# Patient Record
Sex: MALE | Race: BLACK OR AFRICAN AMERICAN | Employment: UNEMPLOYED | URBAN - METROPOLITAN AREA
[De-identification: names, ages, dates, MRNs, and addresses within clinical notes are randomized per-mention and may not be internally consistent; named-entity substitution may affect disease eponyms.]

---

## 2019-03-13 ENCOUNTER — OFFICE VISIT (OUTPATIENT)
Dept: SLEEP MEDICINE | Age: 69
End: 2019-03-13

## 2019-03-13 VITALS
DIASTOLIC BLOOD PRESSURE: 69 MMHG | OXYGEN SATURATION: 97 % | WEIGHT: 302 LBS | HEART RATE: 79 BPM | HEIGHT: 71 IN | BODY MASS INDEX: 42.28 KG/M2 | SYSTOLIC BLOOD PRESSURE: 124 MMHG

## 2019-03-13 DIAGNOSIS — G47.33 OSA (OBSTRUCTIVE SLEEP APNEA): Primary | ICD-10-CM

## 2019-03-13 DIAGNOSIS — Z86.73 H/O: STROKE: ICD-10-CM

## 2019-03-13 DIAGNOSIS — I10 ESSENTIAL HYPERTENSION: ICD-10-CM

## 2019-03-13 RX ORDER — FUROSEMIDE 20 MG/1
TABLET ORAL DAILY
COMMUNITY

## 2019-03-13 RX ORDER — AMLODIPINE BESYLATE 10 MG/1
TABLET ORAL DAILY
COMMUNITY

## 2019-03-13 RX ORDER — ATORVASTATIN CALCIUM 40 MG/1
TABLET, FILM COATED ORAL DAILY
COMMUNITY

## 2019-03-13 RX ORDER — FINASTERIDE 5 MG/1
5 TABLET, FILM COATED ORAL DAILY
COMMUNITY

## 2019-03-13 NOTE — PROGRESS NOTES
217 [facility] [place]. [place], [address]  Tel.  [phone]  Fax. 100 [facility] 60  [place], [address] [facility]  Tel.  [phone]  Fax. [phone] [address]   Tel.  [phone]  Fax. [phone]         Subjective:      Zayra Rudolph is an 77 y.o. male referred for evaluation for a sleep disorder. He complains of snoring associated with excessive daytime sleepiness. Symptoms began several years ago, unchanged since that time. He was previously diagnosed with LUPE (AHI:  15 per hour) and prescribed CPAP Therapy which he found difficult to tolerate and stopped using it. He has tried different masks but without much benefit. He usually can fall asleep in 5 minutes. Family or house members note snoring. He denies completely or partially paralyzed while falling asleep or waking up. Zayra Rudolph does not wake up frequently at night. He is not bothered by waking up too early and left unable to get back to sleep. He actually sleeps about 6 hours at night and wakes up about 3 times during the night. He does not work shifts: Cristiano Spain indicates he does get too little sleep at night. His bedtime is 0000. He awakens at 0600. He does take naps. He takes 7 naps a week lasting 10 to 15, Minute(s). He has the following observed behaviors: Loud snoring, Light snoring;  . Other remarks: vivd dreams    [place] Sleepiness Score: 7     No Known Allergies      Current Outpatient Medications:     atorvastatin (LIPITOR) 40 mg tablet, Take  by mouth daily. , Disp: , Rfl:     furosemide (LASIX) 20 mg tablet, Take  by mouth daily. , Disp: , Rfl:     amLODIPine (NORVASC) 10 mg tablet, Take  by mouth daily. , Disp: , Rfl:     multivitamin-iron-FA, hematinic, (CENTRUM)  mg-mcg tab tablet, Take 1 Tab by mouth daily. , Disp: , Rfl:     brexpiprazole (REXULTI) 1 mg tab tablet, Take  by mouth daily. , Disp: , Rfl:     finasteride (PROSCAR) 5 mg tablet, Take 5 mg by mouth daily. , Disp: , Rfl:     citalopram (CELEXA) 10 mg tablet, Take 10 mg by mouth daily. , Disp: , Rfl:     ARIPiprazole (ABILIFY) 15 mg tablet, Take 15 mg by mouth daily. , Disp: , Rfl:     calcium 500 mg tab, Take  by mouth., Disp: , Rfl:     lisinopril (PRINIVIL, ZESTRIL) 20 mg tablet, Take 20 mg by mouth daily. , Disp: , Rfl:     Hydrochlorothiazide (HYDRODIURIL) 12.5 mg tablet, Take 12.5 mg by mouth daily. , Disp: , Rfl:     rosuvastatin (CRESTOR) 20 mg tablet, Take 20 mg by mouth nightly., Disp: , Rfl:     metFORMIN (GLUCOPHAGE) 500 mg tablet, Take 500 mg by mouth two (2) times daily (with meals). , Disp: , Rfl:     acetaminophen (TYLENOL) 325 mg tablet, Take 1,000 mg by mouth every four (4) hours as needed for Pain., Disp: , Rfl:     aspirin delayed-release 81 mg tablet, Take 325 mg by mouth every eight (8) hours. , Disp: , Rfl:     levothyroxine (SYNTHROID) 25 mcg tablet, Take 25 mcg by mouth Daily (before breakfast). , Disp: , Rfl:     oxyCODONE IR (ROXICODONE) 10 mg tab immediate release tablet, Take 0.5 Tabs by mouth every four (4) hours as needed. Max Daily Amount: 30 mg., Disp: 12 Tab, Rfl: 0    meloxicam (MOBIC) 7.5 mg tablet, Take 1 Tab by mouth daily. , Disp: 12 Tab, Rfl: 0    cyclobenzaprine (FLEXERIL) 10 mg tablet, Take 1 Tab by mouth three (3) times daily as needed for Muscle Spasm(s). , Disp: 20 Tab, Rfl: 0     He  has a past medical history of Hypertension, Ill-defined condition, and Stroke ([address] He  has a past surgical history that includes pr extrac erupted tooth/exposed root; hx trabeculectomy (Right); upper gi endoscopy,biopsy (4/9/2015); and colonoscopy,caden juarez,snare (4/9/2015). He family history includes Hypertension in his father. He  reports that he has quit smoking. His smoking use included cigarettes. He has a 20.00 pack-year smoking history. he has never used smokeless tobacco. He reports that he does not drink alcohol or use drugs.      Review of Systems:  Constitutional:  significant weight gain  Eyes:  No blurred vision  CVS:  No significant chest pain  Pulm:  No significant shortness of breath  GI:  No significant nausea or vomiting  :  No significant nocturia  Musculoskeletal:  No significant joint pain at night  Skin:  No significant rashes  Neuro:  No significant dizziness   Psych:  No active mood issues    Sleep Review of Systems: notable for no difficulty falling asleep; infrequent awakenings at night;  regular dreaming noted; no nightmares ; no early morning headaches; no memory problems; no concentration issues; no history of any automobile or occupational accidents due to daytime drowsiness. Objective:     Visit Vitals  /69 (BP 1 Location: Left arm, BP Patient Position: Sitting)   Pulse 79   Ht 5' 11\" (1.803 m)   Wt 302 lb (137 kg)   SpO2 97%   BMI 42.12 kg/m²         General:   Not in acute distress   Eyes:  Anicteric sclerae, no obvious strabismus   Nose:  No obvious nasal septum deviation    Oropharynx:   Class 4 oropharyngeal outlet, thick tongue base, uvula could not be seen due to low-lying soft palate, narrow tonsilo-pharyngeal pilars   Tonsils:   tonsils are not seen due to low-lying soft palate   Neck:   Neck circ. in \"inches\": 19; midline trachea   Chest/Lungs:  Equal lung expansion, clear on auscultation    CVS:  Normal rate, regular rhythm; no JVD   Skin:  Warm to touch; no obvious rashes   Neuro:  No focal deficits ; no obvious tremor    Psych:  Normal affect,  normal countenance;          Assessment:       ICD-10-CM ICD-9-CM    1. LUPE (obstructive sleep apnea) G47.33 327.23 POLYSOMNOGRAPHY 1 NIGHT   2. BMI 40.0-44.9, adult ([facility] Z68.41 V85.41    3. Essential hypertension I10 401.9    4. H/O: stroke Z86.73 V12.54          Plan:     * The patient currently has a High Risk for having sleep apnea. STOP-BANG score 7.  * Sleep testing was ordered for initial evaluation.       Orders Placed This Encounter    POLYSOMNOGRAPHY 1 NIGHT     Standing Status:   Future     Standing Expiration Date:   9/13/2019     Scheduling Instructions:      Perform ETCO2 monitoring during Polysomnography - Do not split. Order Specific Question:   Reason for Exam     Answer:   LUPE / OHS       * He was provided information on sleep apnea including coresponding risk factors and the importance of proper treatment. * Treatment options if indicated were reviewed today. Patient agrees to a trial of Bi-Level PAP therapy if indicated due to prior CPAP Failure. * Counseling was provided regarding proper sleep hygiene (including effect of light on sleep), paradoxical intention, stimulus control, sleep environment safety and safe driving. * Effect of sleep disturbance on weight was reviewed. We have recommended a dedicated weight loss through appropriate diet and an exercise regiment as significant weight reduction has been shown to reduce severity of obstructive sleep apnea. * Patient agrees to telephone [phone]  follow-up by myself or lead sleep technologist shortly after sleep study to review results and plan final management.     (patient has given permission for a message to be left regarding test results and further management if patient cannot be cannot be reached directly). Thank you for allowing us to participate in your patient's medical care. We'll keep you updated on these investigations. Dyllan Reagan MD, FAASM  Electronically signed.  03/13/19

## 2019-03-13 NOTE — PATIENT INSTRUCTIONS
217 [facility] [place]. [place], [address]  Tel.  [phone]  Fax. 100 [facility] 60  [place], [address] [facility]  Tel.  [phone]  Fax. [phone] [address]  Tel.  [phone]  Fax. [phone]     Sleep Apnea: After Your Visit  Your Care Instructions  Sleep apnea occurs when you frequently stop breathing for 10 seconds or longer during sleep. It can be mild to severe, based on the number of times per hour that you stop breathing or have slowed breathing. Blocked or narrowed airways in your nose, mouth, or throat can cause sleep apnea. Your airway can become blocked when your throat muscles and tongue relax during sleep. Sleep apnea is common, occurring in 1 out of 20 individuals. Individuals having any of the following characteristics should be evaluated and treated right away due to high risk and detrimental consequences from untreated sleep apnea:  1. Obesity  2. Congestive Heart failure  3. Atrial Fibrillation  4. Uncontrolled Hypertension  5. Type II Diabetes  6. Night-time Arrhythmias  7. Stroke  8. Pulmonary Hypertension  9. High-risk Driving Populations (pilots, truck drivers, etc.)  10. Patients Considering Weight-loss Surgery    How do you know you have sleep apnea? You probably have sleep apnea if you answer 'yes' to 3 or more of the following questions:  S - Have you been told that you Snore? T - Are you often Tired during the day? O - Has anyone Observed you stop breathing while sleeping? P- Do you have (or are being treated for) high blood Pressure? B - Are you obese (Body Mass Index > 35)? A - Is your Age 48years old or older? N - Is your Neck size greater than 16 inches? G - Are you male Gender? A sleep physician can prescribe a breathing device that prevents tissues in the throat from blocking your airway.  Or your doctor may recommend using a dental device (oral breathing device) to help keep your airway open. In some cases, surgery may be needed to remove enlarged tissues in the throat. Follow-up care is a key part of your treatment and safety. Be sure to make and go to all appointments, and call your doctor if you are having problems. It's also a good idea to know your test results and keep a list of the medicines you take. How can you care for yourself at home? · Lose weight, if needed. It may reduce the number of times you stop breathing or have slowed breathing. · Go to bed at the same time every night. · Sleep on your side. It may stop mild apnea. If you tend to roll onto your back, sew a pocket in the back of your pajama top. Put a tennis ball into the pocket, and stitch the pocket shut. This will help keep you from sleeping on your back. · Avoid alcohol and medicines such as sleeping pills and sedatives before bed. · Do not smoke. Smoking can make sleep apnea worse. If you need help quitting, talk to your doctor about stop-smoking programs and medicines. These can increase your chances of quitting for good. · Prop up the head of your bed 4 to 6 inches by putting bricks under the legs of the bed. · Treat breathing problems, such as a stuffy nose, caused by a cold or allergies. · Use a continuous positive airway pressure (CPAP) breathing machine if lifestyle changes do not help your apnea and your doctor recommends it. The machine keeps your airway from closing when you sleep. · If CPAP does not help you, ask your doctor whether you should try other breathing machines. A bilevel positive airway pressure machine has two types of air pressureâone for breathing in and one for breathing out. Another device raises or lowers air pressure as needed while you breathe. · If your nose feels dry or bleeds when using one of these machines, talk with your doctor about increasing moisture in the air. A humidifier may help.   · If your nose is runny or stuffy from using a breathing machine, talk with your doctor about using decongestants or a corticosteroid nasal spray. When should you call for help? Watch closely for changes in your health, and be sure to contact your doctor if:  · You still have sleep apnea even though you have made lifestyle changes. · You are thinking of trying a device such as CPAP. · You are having problems using a CPAP or similar machine. Where can you learn more? Go to [facility]. Enter D189 in the search box to learn more about \"Sleep Apnea: After Your Visit. \"   © 6950-7740 Healthwise, Incorporated. Care instructions adapted under license by [facility] [employer] (which disclaims liability or warranty for this information). This care instruction is for use with your licensed healthcare professional. If you have questions about a medical condition or this instruction, always ask your healthcare professional. Dony Ayala any warranty or liability for your use of this information. PROPER SLEEP HYGIENE    What to avoid  · Do not have drinks with caffeine, such as coffee or black tea, for 8 hours before bed. · Do not smoke or use other types of tobacco near bedtime. Nicotine is a stimulant and can keep you awake. · Avoid drinking alcohol late in the evening, because it can cause you to wake in the middle of the night. · Do not eat a big meal close to bedtime. If you are hungry, eat a light snack. · Do not drink a lot of water close to bedtime, because the need to urinate may wake you up during the night. · Do not read or watch TV in bed. Use the bed only for sleeping and sexual activity. What to try  · Go to bed at the same time every night, and wake up at the same time every morning. Do not take naps during the day. · Keep your bedroom quiet, dark, and cool. · Get regular exercise, but not within 3 to 4 hours of your bedtime. .  · Sleep on a comfortable pillow and mattress.   · If watching the clock makes you anxious, turn it facing away from you so you cannot see the time. · If you worry when you lie down, start a worry book. Well before bedtime, write down your worries, and then set the book and your concerns aside. · Try meditation or other relaxation techniques before you go to bed. · If you cannot fall asleep, get up and go to another room until you feel sleepy. Do something relaxing. Repeat your bedtime routine before you go to bed again. · Make your house quiet and calm about an hour before bedtime. Turn down the lights, turn off the TV, log off the computer, and turn down the volume on music. This can help you relax after a busy day. Drowsy Driving  The [address] [place] [address] Road Traffic Safety Administration cites drowsiness as a causing factor in more than 424,338 police reported crashes annually, resulting in 76,000 injuries and 1,500 deaths. Other surveys suggest 55% of people polled have driven while drowsy in the past year, 23% had fallen asleep but not crashed, 3% crashed, and 2% had and accident due to drowsy driving. Who is at risk? Young Drivers: One study of drowsy driving accidents states that 55% of the drivers were under 25 years. Of those, 75% were male. Shift Workers and Travelers: People who work overnight or travel across time zones frequently are at higher risk of experiencing Circadian Rhythm Disorders. They are trying to work and function when their body is programed to sleep. Sleep Deprived: Lack of sleep has a serious impact on your ability to pay attention or focus on a task. Consistently getting less than the average of 8 hours your body needs creates partial or cumulative sleep deprivation. Untreated Sleep Disorders: Sleep Apnea, Narcolepsy, R.L.S., and other sleep disorders (untreated) prevent a person from getting enough restful sleep. This leads to excessive daytime sleepiness and increases the risk for drowsy driving accidents by up to 7 times.   Medications / Alcohol: Even over the counter medications can cause drowsiness. Medications that impair a drivers attention should have a warning label. Alcohol naturally makes you sleepy and on its own can cause accidents. Combined with excessive drowsiness its effects are amplified. Signs of Drowsy Driving:   * You don't remember driving the last few miles   * You may drift out of your [place]   * You are unable to focus and your thoughts wander   * You may yawn more often than normal   * You have difficulty keeping your eyes open / nodding off   * Missing traffic signs, speeding, or tailgating  Prevention-   Good sleep hygiene, lifestyle and behavioral choices have the most impact on drowsy driving. There is no substitute for sleep and the average person requires 8 hours nightly. If you find yourself driving drowsy, stop and sleep. Consider the sleep hygiene tips provided during your visit as well. Medication Refill Policy: Refills for all medications require 1 week advance notice. Please have your pharmacy fax a refill request. We are unable to fax, or call in \"controled substance\" medications and you will need to pick these prescriptions up from our office. getupp Activation    Thank you for requesting access to getupp. Please follow the instructions below to securely access and download your online medical record. getupp allows you to send messages to your doctor, view your test results, renew your prescriptions, schedule appointments, and more. How Do I Sign Up? 1. In your internet browser, go to [email or web address]. 2. Click on the First Time User? Click Here link in the Sign In box. You will see the New Member Sign Up page. 3. Enter your getupp Access Code exactly as it appears below. You will not need to use this code after youve completed the sign-up process. If you do not sign up before the expiration date, you must request a new code.     getupp Access Code: [email or web address]  Expires: 4/27/2019 11:29 AM (This is the date your NetLex access code will )    4. Enter the last four digits of your Social Security Number (xxxx) and Date of Birth (mm/dd/yyyy) as indicated and click Submit. You will be taken to the next sign-up page. 5. Create a MoveEZt ID. This will be your NetLex login ID and cannot be changed, so think of one that is secure and easy to remember. 6. Create a NetLex password. You can change your password at any time. 7. Enter your Password Reset Question and Answer. This can be used at a later time if you forget your password. 8. Enter your e-mail address. You will receive e-mail notification when new information is available in [address] 9. Click Sign Up. You can now view and download portions of your medical record. 10. Click the Download Summary menu link to download a portable copy of your medical information. Additional Information    If you have questions, please call [phone]. Remember, NetLex is NOT to be used for urgent needs. For medical emergencies, dial 911.

## 2019-04-16 ENCOUNTER — HOSPITAL ENCOUNTER (OUTPATIENT)
Dept: SLEEP MEDICINE | Age: 69
Discharge: HOME | End: 2019-04-16
Attending: INTERNAL MEDICINE
Payer: MEDICARE

## 2019-04-16 VITALS
OXYGEN SATURATION: 95 % | BODY MASS INDEX: 42.7 KG/M2 | WEIGHT: 305 LBS | DIASTOLIC BLOOD PRESSURE: 95 MMHG | SYSTOLIC BLOOD PRESSURE: 170 MMHG | HEIGHT: 71 IN | TEMPERATURE: 99.6 F

## 2019-04-16 DIAGNOSIS — G47.33 OSA (OBSTRUCTIVE SLEEP APNEA): ICD-10-CM

## 2019-04-16 PROCEDURE — 95810 POLYSOM 6/> YRS 4/> PARAM: CPT

## 2019-04-17 ENCOUNTER — TELEPHONE (OUTPATIENT)
Dept: SLEEP MEDICINE | Age: 69
End: 2019-04-17

## 2019-04-17 DIAGNOSIS — G47.33 OSA (OBSTRUCTIVE SLEEP APNEA): Primary | ICD-10-CM

## 2019-04-24 NOTE — TELEPHONE ENCOUNTER
Ciro June is to be contacted by lead sleep technologist regarding results of Sleep Testing which was indicative of an average AHI of 53 per hour with an SpO2 lucy of 81% . * A second polysomnogram has been ordered for mask fitting, PAP desensitizing protocol, and pressure titration. * Follow-up appointment will be scheduled 8-12 weeks following PAP initiation to gauge treatment response and compliance. Encounter Diagnosis   Name Primary?     LUPE (obstructive sleep apnea) Yes       Orders Placed This Encounter    SLEEP LAB (PAP TITRATION)     Standing Status:   Future     Standing Expiration Date:   10/24/2019     Order Specific Question:   Reason for Exam     Answer:   LUPE

## 2019-04-29 ENCOUNTER — TELEPHONE (OUTPATIENT)
Dept: SLEEP MEDICINE | Age: 69
End: 2019-04-29

## 2019-04-29 NOTE — TELEPHONE ENCOUNTER
Patient called in stating he has been waiting on results, study done on 4/16/19.  Please call at [phone]

## 2019-05-06 ENCOUNTER — HOSPITAL ENCOUNTER (OUTPATIENT)
Dept: SLEEP MEDICINE | Age: 69
Discharge: HOME | End: 2019-05-06
Attending: INTERNAL MEDICINE
Payer: MEDICARE

## 2019-05-06 VITALS
HEART RATE: 77 BPM | SYSTOLIC BLOOD PRESSURE: 166 MMHG | OXYGEN SATURATION: 97 % | BODY MASS INDEX: 41.19 KG/M2 | HEIGHT: 72 IN | WEIGHT: 304.1 LBS | DIASTOLIC BLOOD PRESSURE: 94 MMHG | TEMPERATURE: 98.2 F

## 2019-05-06 DIAGNOSIS — G47.33 OSA (OBSTRUCTIVE SLEEP APNEA): ICD-10-CM

## 2019-05-06 PROCEDURE — 95811 POLYSOM 6/>YRS CPAP 4/> PARM: CPT | Performed by: INTERNAL MEDICINE

## 2019-05-08 ENCOUNTER — TELEPHONE (OUTPATIENT)
Dept: SLEEP MEDICINE | Age: 69
End: 2019-05-08

## 2019-05-08 DIAGNOSIS — G47.33 OSA (OBSTRUCTIVE SLEEP APNEA): Primary | ICD-10-CM

## 2019-05-15 ENCOUNTER — DOCUMENTATION ONLY (OUTPATIENT)
Dept: SLEEP MEDICINE | Age: 69
End: 2019-05-15

## 2019-05-15 NOTE — TELEPHONE ENCOUNTER
Orders Placed This Encounter    AMB SUPPLY ORDER     Diagnosis: (G47.33) LUPE (obstructive sleep apnea)  (primary encounter diagnosis)     Respironics DreamStation ( Unit - Auto Mode) / Heated Humidifier :    Positive Airway Pressure Therapy: Duration of need: 99 months. Auto - PAP: 9 - 12 cmH2O;   Ramp Time: 30 Minutes; Flex: 2. Remote monitoring enrollment.  Full Face Mask 1 every 3 months.  Full Face Mask Cushion 1 per month.  Headgear 1 every 6 months.  Filter(s) Disposable 2 per month.  Filter(s) Non-Disposable 1 every 6 months. [address] for [employer] (Replace) 1 every 6 months.  Tubing with heating element 1 every 3 months. Perform Mask Fitting per patient preference and comfort - replace as above. Eden Sanchez MD, FAASM; NPI: [id number]  Electronically signed. 05/15/19

## 2019-05-16 ENCOUNTER — DOCUMENTATION ONLY (OUTPATIENT)
Dept: SLEEP MEDICINE | Age: 69
End: 2019-05-16

## 2019-05-16 ENCOUNTER — TELEPHONE (OUTPATIENT)
Dept: SLEEP MEDICINE | Age: 69
End: 2019-05-16

## 2019-05-16 NOTE — TELEPHONE ENCOUNTER
Called to schedule adherence visit and review DME info- mailbox not set up unable to leave a message.

## 2019-09-11 ENCOUNTER — OFFICE VISIT (OUTPATIENT)
Dept: SLEEP MEDICINE | Age: 69
End: 2019-09-11

## 2019-09-11 VITALS
SYSTOLIC BLOOD PRESSURE: 136 MMHG | WEIGHT: 301.5 LBS | OXYGEN SATURATION: 100 % | BODY MASS INDEX: 39.96 KG/M2 | HEART RATE: 72 BPM | HEIGHT: 73 IN | DIASTOLIC BLOOD PRESSURE: 84 MMHG

## 2019-09-11 DIAGNOSIS — Z86.73 H/O: STROKE: ICD-10-CM

## 2019-09-11 DIAGNOSIS — I10 ESSENTIAL HYPERTENSION: ICD-10-CM

## 2019-09-11 DIAGNOSIS — G47.33 OSA (OBSTRUCTIVE SLEEP APNEA): Primary | ICD-10-CM

## 2019-09-11 PROBLEM — E66.01 SEVERE OBESITY (HCC): Status: ACTIVE | Noted: 2019-09-11

## 2019-09-11 NOTE — PROGRESS NOTES
217 [facility] [address]. [place], [address]   Tel.  [phone]   Fax. 100 [facility] 60   [place], [address] [facility]   Tel.  [phone]   Fax. [phone] [address]    Tel.  [phone]   Fax. [phone]       Subjective:   Marie Hagan is a 68 y.o. male seen for a positive airway pressure follow-up, last seen by Dr. Michael Bhagat on 3/13/2019, prior notes reviewed in detail. In lab sleep test 4/2019 showed AHI of 53.4/hr with a lowest SaO2 of 81%. He is here for annual follow up. He reports no problems using the device. The following concerns reviewed:    Drowsiness no Problems exhaling no   Snoring no Forget to put on no   Mask Comfortable yes Can't fall asleep no   Dry Mouth no Mask falls off no   Air Leaking no Frequent awakenings no       He admits that his sleep has improved on PAP therapy using full face mask and heated tubing. Review of device download indicated:  Auto pressure: 9-12 cmH2O; Average % Night in Large Leak:0.4; % Used Days >= 4 hours: 83. Therapy Apnea Index averaged over PAP use: 3.4 /hr which reflects improved sleep breathing condition. [place] Sleepiness Score: 10 and Modified F.O.S.Q. Score Total / 2: 20     No Known Allergies    He has a current medication list which includes the following prescription(s): atorvastatin, amlodipine, multivitamin-iron-fa (hematinic), brexpiprazole, finasteride, lisinopril, hydrochlorothiazide, metformin, acetaminophen, aspirin delayed-release, levothyroxine, furosemide, citalopram, aripiprazole, calcium, rosuvastatin, oxycodone ir, meloxicam, and cyclobenzaprine. .      He  has a past medical history of Hypertension, Ill-defined condition, and Stroke ([facility] [address] Sleep Review of Systems: notable for Negative difficulty falling asleep; Negative awakenings at night; Negative early morning headaches; Negative memory problems; Negative concentration issues;  Negative chest pain; Negative shortness of breath; Negative significant joint pain at night; Negative significant muscle pain at night; Negative rashes or itching; Negative heartburn; Negative significant mood issues; 7 afternoon naps per week; Negative history of any automobile or occupational accidents due to daytime drowsiness      Objective:     Visit Vitals  /84   Pulse 72   Ht 6' 1\" (1.854 m)   Wt 301 lb 8 oz (136.8 kg)   SpO2 100%   BMI 39.78 kg/m²          General:   Alert, oriented, not in acute distress   Eyes:  Anicteric Sclerae; no obvious strabismus   Nose:  No obvious nasal septum deviation    Oropharynx:   Mallampati score 4, thick tongue base, uvula not seen due to low-lying soft palate, narrow tonsilo-pharyngeal pilars   Neck:   Midline trachea   Chest/Lungs:  Symmetrical lung expansion, clear lung fields on auscultation    CVS:  Normal rate, regular rhythm,  no JVD   Extremities:  No obvious rashes, no edema    Neuro:  No focal deficits; No obvious tremor    Psych:  Normal affect,  normal countenance       Assessment:       ICD-10-CM ICD-9-CM    1. LUPE (obstructive sleep apnea) G47.33 327.23 AMB SUPPLY ORDER   2. Essential hypertension I10 401.9    3. H/O: stroke Z86.73 V12.54    4. Adult BMI 39.0-39.9 kg/sq m Z68.39 V85.39        AHI = 53.4(4/2019). On APAP :  9-12 cmH2O. He is compliant with PAP therapy and PAP continues to benefit patient and remains necessary for control of his sleep apnea. Plan:     Follow-up and Dispositions    · Return in about 1 year (around 9/11/2020). * Continue on current pressures    * Supplies ordered - full face mask and heated tubing    Orders Placed This Encounter    AMB SUPPLY ORDER     Diagnosis: (G47.33) LUPE (obstructive sleep apnea)  (primary encounter diagnosis)     Replacement Supplies for Positive Airway Pressure Therapy Device:   Duration of need: 99 months.  Full Face Mask 1 every 3 months.  Full Face Mask Cushion 1 per month.      Headgear 1 every 6 months.  Tubing with heating element 1 every 3 months.  Filter(s) Disposable 2 per month.  Filter(s) Non-Disposable 1 every 6 months. .   [address] [facility] for Humidifier (Replace) 1 every 6 months. KEVIN Sears; NPI: [id number]    Electronically signed. Date:- 09/11/19       * Counseling was provided regarding the importance of regular PAP use with emphasis on ensuring sufficient total sleep time, proper sleep hygiene, and safe driving. * Re-enforced proper and regular cleaning for the device. He is interested in buying a [address] is valid with this device. * He was asked to contact our office for any problems regarding PAP therapy. 2. Hypertension -  continue on his current regimen, he will continue to monitor his BP and follow up with his PMD for reevaluation/adjustment of medications if warranted. I have reviewed the relationship between hypertension as it relates to sleep-disordered breathing. 3. H/O Stroke - continue on his current regimen. I have reviewed the relationship between CVA and sleep disordered breathing. 4. Encouraged a dedicated weight loss program through appropriate diet and exercise regimen as significant weight reduction has been shown to reduce severity of obstructive sleep apnea. We discussed using the pool at the [facility] as an option for low impact exercise as he has lower back pain. Patient's phone number [phone] (home)  and [phone] were reviewed and confirmed for accuracy. He gives permission for messages regarding results and appointments to be left at that number. KEVIN Sears, [address] [place] [address]  Electronically signed.  09/11/19

## 2019-09-11 NOTE — PATIENT INSTRUCTIONS
217 [facility] Willi. [place], [address]  Tel.  [phone]  Fax. 100 [facility] 60  [place], [address] [facility]  Tel.  [phone]  Fax. [phone] [address]  Tel.  [phone]  Fax. [phone]     Learning About CPAP for Sleep Apnea  What is CPAP? CPAP is a small machine that you use at home every night while you sleep. It increases air pressure in your throat to keep your airway open. When you have sleep apnea, this can help you sleep better so you feel much better. CPAP stands for \"continuous positive airway pressure. \"  The CPAP machine will have one of the following:  · A mask that covers your nose and mouth  · Prongs that fit into your nose  · A mask that covers your nose only, the most common type. This type is called NCPAP. The N stands for \"nasal.\"  Why is it done? CPAP is usually the best treatment for obstructive sleep apnea. It is the first treatment choice and the most widely used. Your doctor may suggest CPAP if you have:  · Moderate to severe sleep apnea. · Sleep apnea and coronary artery disease (CAD) or heart failure. How does it help? · CPAP can help you have more normal sleep, so you feel less sleepy and more alert during the daytime. · CPAP may help keep heart failure or other heart problems from getting worse. · NCPAP may help lower your blood pressure. · If you use CPAP, your bed partner may also sleep better because you are not snoring or restless. What are the side effects? Some people who use CPAP have:  · A dry or stuffy nose and a sore throat. · Irritated skin on the face. · Sore eyes. · Bloating. If you have any of these problems, work with your doctor to fix them. Here are some things you can try:  · Be sure the mask or nasal prongs fit well. · See if your doctor can adjust the pressure of your CPAP. · If your nose is dry, try a humidifier.   · If your nose is runny or stuffy, try decongestant medicine or a steroid nasal spray. If these things do not help, you might try a different type of machine. Some machines have air pressure that adjusts on its own. Others have air pressures that are different when you breathe in than when you breathe out. This may reduce discomfort caused by too much pressure in your nose. Where can you learn more? Go to Telderi.be  Enter Josse Freeze in the search box to learn more about \"Learning About CPAP for Sleep Apnea. \"   © 7683-2572 Healthwise, Incorporated. Care instructions adapted under license by [employer] (which disclaims liability or warranty for this information). This care instruction is for use with your licensed healthcare professional. If you have questions about a medical condition or this instruction, always ask your healthcare professional. [address] any warranty or liability for your use of this information. Content Version: 4.1.75046; Last Revised: January 11, 2010  PROPER SLEEP HYGIENE    What to avoid  · Do not have drinks with caffeine, such as coffee or black tea, for 8 hours before bed. · Do not smoke or use other types of tobacco near bedtime. Nicotine is a stimulant and can keep you awake. · Avoid drinking alcohol late in the evening, because it can cause you to wake in the middle of the night. · Do not eat a big meal close to bedtime. If you are hungry, eat a light snack. · Do not drink a lot of water close to bedtime, because the need to urinate may wake you up during the night. · Do not read or watch TV in bed. Use the bed only for sleeping and sexual activity. What to try  · Go to bed at the same time every night, and wake up at the same time every morning. Do not take naps during the day. · Keep your bedroom quiet, dark, and cool. · Get regular exercise, but not within 3 to 4 hours of your bedtime. .  · Sleep on a comfortable pillow and mattress.   · If watching the clock makes you anxious, turn it facing away from you so you cannot see the time. · If you worry when you lie down, start a worry book. Well before bedtime, write down your worries, and then set the book and your concerns aside. · Try meditation or other relaxation techniques before you go to bed. · If you cannot fall asleep, get up and go to another room until you feel sleepy. Do something relaxing. Repeat your bedtime routine before you go to bed again. · Make your house quiet and calm about an hour before bedtime. Turn down the lights, turn off the TV, log off the computer, and turn down the volume on music. This can help you relax after a busy day. Drowsy Driving: The [employer] cites drowsiness as a causing factor in more than 544,782 police reported crashes annually, resulting in 76,000 injuries and 1,500 deaths. Other surveys suggest 55% of people polled have driven while drowsy in the past year, 23% had fallen asleep but not crashed, 3% crashed, and 2% had and accident due to drowsy driving. Who is at risk? Young Drivers: One study of drowsy driving accidents states that 55% of the drivers were under 25 years. Of those, 75% were male. Shift Workers and Travelers: People who work overnight or travel across time zones frequently are at higher risk of experiencing Circadian Rhythm Disorders. They are trying to work and function when their body is programed to sleep. Sleep Deprived: Lack of sleep has a serious impact on your ability to pay attention or focus on a task. Consistently getting less than the average of 8 hours your body needs creates partial or cumulative sleep deprivation. Untreated Sleep Disorders: Sleep Apnea, Narcolepsy, R.L.S., and other sleep disorders (untreated) prevent a person from getting enough restful sleep. This leads to excessive daytime sleepiness and increases the risk for drowsy driving accidents by up to 7 times.   Medications / Alcohol: Even over the counter medications can cause drowsiness. Medications that impair a drivers attention should have a warning label. Alcohol naturally makes you sleepy and on its own can cause accidents. Combined with excessive drowsiness its effects are amplified. Signs of Drowsy Driving:   * You don't remember driving the last few miles   * You may drift out of your [place]   * You are unable to focus and your thoughts wander   * You may yawn more often than normal   * You have difficulty keeping your eyes open / nodding off   * Missing traffic signs, speeding, or tailgating  Prevention-   Good sleep hygiene, lifestyle and behavioral choices have the most impact on drowsy driving. There is no substitute for sleep and the average person requires 8 hours nightly. If you find yourself driving drowsy, stop and sleep. Consider the sleep hygiene tips provided during your visit as well. Medication Refill Policy: Refills for all medications require 1 week advance notice. Please have your pharmacy fax a refill request. We are unable to fax, or call in \"controled substance\" medications and you will need to pick these prescriptions up from our office. Imaginatik Activation    Thank you for requesting access to Imaginatik. Please follow the instructions below to securely access and download your online medical record. Imaginatik allows you to send messages to your doctor, view your test results, renew your prescriptions, schedule appointments, and more. How Do I Sign Up? 1. In your internet browser, go to [email or web address]. 2. Click on the First Time User? Click Here link in the Sign In box. You will see the New Member Sign Up page. 3. Enter your Imaginatik Access Code exactly as it appears below. You will not need to use this code after youve completed the sign-up process. If you do not sign up before the expiration date, you must request a new code.     Imaginatik Access Code: [email or web address]  Expires: 10/26/2019  2:56 PM (This is the date your GameFly access code will )    4. Enter the last four digits of your Social Security Number (xxxx) and Date of Birth (mm/dd/yyyy) as indicated and click Submit. You will be taken to the next sign-up page. 5. Create a GameFly ID. This will be your GameFly login ID and cannot be changed, so think of one that is secure and easy to remember. 6. Create a GameFly password. You can change your password at any time. 7. Enter your Password Reset Question and Answer. This can be used at a later time if you forget your password. 8. Enter your e-mail address. You will receive e-mail notification when new information is available in [address] 9. Click Sign Up. You can now view and download portions of your medical record. 10. Click the Download Summary menu link to download a portable copy of your medical information. Additional Information    If you have questions, please call [phone]. Remember, GameFly is NOT to be used for urgent needs. For medical emergencies, dial 911.

## 2019-09-13 ENCOUNTER — DOCUMENTATION ONLY (OUTPATIENT)
Dept: SLEEP MEDICINE | Age: 69
End: 2019-09-13

## 2020-09-14 ENCOUNTER — VIRTUAL VISIT (OUTPATIENT)
Dept: SLEEP MEDICINE | Age: 70
End: 2020-09-14
Payer: MEDICARE

## 2020-09-14 ENCOUNTER — DOCUMENTATION ONLY (OUTPATIENT)
Dept: SLEEP MEDICINE | Age: 70
End: 2020-09-14

## 2020-09-14 DIAGNOSIS — I10 ESSENTIAL HYPERTENSION: ICD-10-CM

## 2020-09-14 DIAGNOSIS — G47.33 OSA (OBSTRUCTIVE SLEEP APNEA): Primary | ICD-10-CM

## 2020-09-14 DIAGNOSIS — Z86.73 H/O: STROKE: ICD-10-CM

## 2020-09-14 PROCEDURE — 99442 PR PHYS/QHP TELEPHONE EVALUATION 11-20 MIN: CPT | Performed by: NURSE PRACTITIONER

## 2020-09-14 NOTE — PROGRESS NOTES
217 [facility]., Willi. [place], [address]   Tel.  [phone]   Fax. 100 [facility] 60   [place], [address] [facility]   Tel.  [phone]   Fax. [phone] [address] [facility] [address]    Tel.  [phone]   Fax. [phone]       Subjective:     Trini Rene, who was evaluated through a patient-initiated, synchronous (real-time) audio only encounter, and/or her healthcare decision maker, is aware that it is a billable service, with coverage as determined by his insurance carrier. He provided verbal consent to proceed: Yes. He has not had a related appointment within my department in the past 7 days or scheduled within the next 24 hours. Total Time: minutes: 11-20 minutes    I was in the office while conducting this encounter. Patient verified with demographics. Trini Rene is a 65 y.o. male, evaluated via audio-only technology on 9/14/2020 for  a positive airway pressure follow-up, last seen by me on 9/11/2019, previously seen by Dr. Lucina Koehler on 3/13/2019, prior notes reviewed in detail. In lab sleep test 4/2019 showed AHI of 53.4/hr with a lowest SaO2 of 81%. He  is seen today for follow up on device set up 5/31/2019. 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 He reports problems using the device. The following concerns reviewed:    Drowsiness no Problems exhaling no   Snoring no Forget to put on yes   Mask Comfortable yes Can't fall asleep no   Dry Mouth no Mask falls off no   Air Leaking no Frequent awakenings no       He admits that his sleep has improved on PAP therapy using full face mask and heated tubing. He notes that he feels better when he uses device and will start using nightly.     Review of device download indicated:  Auto pressure: 9-12 cmH2O; Peak Avg Pressure: 12.0 cmH2O;  Avg. Device Pressure <= 90 %: 11.8 cmH2O     Average % Night in Large Leak:  0.3  % Used Days >= 4 hours: 61.  Avg hours used:  5:55. We have discussed the benefits of PAP therapy and the related insurance requirements for use of a minimum of 4 hours at least 70% of the days in a 30 day period. Therapy Apnea Index averaged over PAP use: 5.7 /hr which reflects improved sleep breathing condition. [place] Sleepiness Score: 7 and Modified F.O.S.Q. Score Total / 2: 19 which reflects improved sleep quality over therapy time. No Known Allergies    He has a current medication list which includes the following prescription(s): atorvastatin, furosemide, amlodipine, finasteride, citalopram, aripiprazole, lisinopril, hydrochlorothiazide, rosuvastatin, metformin, levothyroxine, meloxicam, multivitamin-iron-fa (hematinic), brexpiprazole, calcium, acetaminophen, aspirin delayed-release, oxycodone ir, and cyclobenzaprine. He  has a past medical history of Hypertension, Ill-defined condition, and Stroke ([facility] [address] Sleep Review of Systems: notable for Negative difficulty falling asleep; Negative awakenings at night; Negative early morning headaches; Negative memory problems; Negative concentration issues; Negative chest pain; Negative shortness of breath; Negative significant joint pain at night; Negative significant muscle pain at night; Negative rashes or itching; Negative heartburn; Negative significant mood issues; occasional afternoon naps     Objective:   Vitals reported by patient   Patient-Reported Vitals 9/14/2020   Patient-Reported Weight 300 lb      Calculated BMI 39    Physical Exam not completed due to audio only visit. Assessment:       ICD-10-CM ICD-9-CM    1. LUPE (obstructive sleep apnea)  G47.33 327.23 AMB SUPPLY ORDER   2. Essential hypertension  I10 401.9    3. H/O: stroke  Z86.73 V12.54    4. Adult BMI 39.0-39.9 kg/sq m  Z68.39 V85.39        AHI = 53.4(4/2019). On APAP :  9-12 cmH2O.     He is not compliant with PAP therapy although when used PAP shows benefit to the patient and remains necessary for control of his sleep apnea. There is continued clinical benefit from the hours of use demonstrated by AHI reduced from 53.4/hr to 5.7/hr.      Plan:     Follow-up and Dispositions    · Return in about 1 year (around 9/14/2021). * Continue on current pressures    * Supplies ordered - full face mask and heated tubing    Orders Placed This Encounter    AMB SUPPLY ORDER     Diagnosis: (G47.33) LUPE (obstructive sleep apnea)  (primary encounter diagnosis)     Replacement Supplies for Positive Airway Pressure Therapy Device:   Duration of need: 99 months.  Full Face Mask 1 every 3 months.  Full Face Mask Cushion 1 per month.  Headgear 1 every 6 months.  Tubing with heating element 1 every 3 months.  Filter(s) Disposable 2 per month.  Filter(s) Non-Disposable 1 every 6 months. .   [address] [facility] for Humidifier (Replace) 1 every 6 months. KEVIN Navas; NPI: [id number]    Electronically signed. Date:- 09/14/20       * Counseling was provided regarding the importance of regular PAP use with emphasis on ensuring sufficient total sleep time, proper sleep hygiene, and safe driving. * Re-enforced proper and regular cleaning for the device. * He was asked to contact our office for any problems regarding PAP therapy. 2. Hypertension -  continue on his current regimen, he will continue to monitor his BP and follow up with his PMD for reevaluation/adjustment of medications if warranted. I have reviewed the relationship between hypertension as it relates to sleep-disordered breathing. 3. H/O Stroke - continue on his current regimen. I have reviewed the relationship between stroke and sleep disordered breathing. 4. Recommended a dedicated weight loss program through appropriate diet and exercise regimen as significant weight reduction has been shown to reduce severity of obstructive sleep apnea.      Patient's phone number [phone] (home)  was reviewed and confirmed for accuracy. He gives permission for messages regarding results and appointments to be left at that number. Pursuant to the emergency declaration under the [facility], [address] [place] [address] [facility] authority and the [employer] Act, this telephone encounter was conducted with patient's (and/or legal guardian's) consent, to reduce the risk of exposure to COVID-19 and provide necessary medical care. Services were provided through a telephone call to substitute for in-person encounter. KEVIN Amato, [address] [place] [address]  Electronically signed.  09/14/20

## 2020-09-14 NOTE — PATIENT INSTRUCTIONS
217 [facility] [place]. 1668 [facility], [address] Tel.  [phone] Fax. 100 [facility] [address] [place], 200 [address] [facility] Tel.  [phone] Fax. [phone] [address] Tel.  [phone] Fax. [phone] Learning About CPAP for Sleep Apnea What is CPAP? CPAP is a small machine that you use at home every night while you sleep. It increases air pressure in your throat to keep your airway open. When you have sleep apnea, this can help you sleep better so you feel much better. CPAP stands for \"continuous positive airway pressure. \" The CPAP machine will have one of the following: · A mask that covers your nose and mouth · Prongs that fit into your nose · A mask that covers your nose only, the most common type. This type is called NCPAP. The N stands for \"nasal.\" Why is it done? CPAP is usually the best treatment for obstructive sleep apnea. It is the first treatment choice and the most widely used. Your doctor may suggest CPAP if you have: · Moderate to severe sleep apnea. · Sleep apnea and coronary artery disease (CAD) or heart failure. How does it help? · CPAP can help you have more normal sleep, so you feel less sleepy and more alert during the daytime. · CPAP may help keep heart failure or other heart problems from getting worse. · NCPAP may help lower your blood pressure. · If you use CPAP, your bed partner may also sleep better because you are not snoring or restless. What are the side effects? Some people who use CPAP have: · A dry or stuffy nose and a sore throat. · Irritated skin on the face. · Sore eyes. · Bloating. If you have any of these problems, work with your doctor to fix them. Here are some things you can try: · Be sure the mask or nasal prongs fit well. · See if your doctor can adjust the pressure of your CPAP. · If your nose is dry, try a humidifier. · If your nose is runny or stuffy, try decongestant medicine or a steroid nasal spray. If these things do not help, you might try a different type of machine. Some machines have air pressure that adjusts on its own. Others have air pressures that are different when you breathe in than when you breathe out. This may reduce discomfort caused by too much pressure in your nose. Where can you learn more? Go to "[employer].be Enter W796 in the search box to learn more about \"Learning About CPAP for Sleep Apnea. \"  
© 5208-7834 Healthwise, Incorporated. Care instructions adapted under license by [employer] (which disclaims liability or warranty for this information). This care instruction is for use with your licensed healthcare professional. If you have questions about a medical condition or this instruction, always ask your healthcare professional. [address] any warranty or liability for your use of this information. Content Version: 4.8.31785; Last Revised: January 11, 2010 PROPER SLEEP HYGIENE What to avoid · Do not have drinks with caffeine, such as coffee or black tea, for 8 hours before bed. · Do not smoke or use other types of tobacco near bedtime. Nicotine is a stimulant and can keep you awake. · Avoid drinking alcohol late in the evening, because it can cause you to wake in the middle of the night. · Do not eat a big meal close to bedtime. If you are hungry, eat a light snack. · Do not drink a lot of water close to bedtime, because the need to urinate may wake you up during the night. · Do not read or watch TV in bed. Use the bed only for sleeping and sexual activity. What to try · Go to bed at the same time every night, and wake up at the same time every morning. Do not take naps during the day. · Keep your bedroom quiet, dark, and cool. · Get regular exercise, but not within 3 to 4 hours of your bedtime. Joe Camargo · Sleep on a comfortable pillow and mattress. · If watching the clock makes you anxious, turn it facing away from you so you cannot see the time. · If you worry when you lie down, start a worry book. Well before bedtime, write down your worries, and then set the book and your concerns aside. · Try meditation or other relaxation techniques before you go to bed. · If you cannot fall asleep, get up and go to another room until you feel sleepy. Do something relaxing. Repeat your bedtime routine before you go to bed again. · Make your house quiet and calm about an hour before bedtime. Turn down the lights, turn off the TV, log off the computer, and turn down the volume on music. This can help you relax after a busy day. Drowsy Driving: The [employer] cites drowsiness as a causing factor in more than 147,190 police reported crashes annually, resulting in 76,000 injuries and 1,500 deaths. Other surveys suggest 55% of people polled have driven while drowsy in the past year, 23% had fallen asleep but not crashed, 3% crashed, and 2% had and accident due to drowsy driving. Who is at risk? Young Drivers: One study of drowsy driving accidents states that 55% of the drivers were under 25 years. Of those, 75% were male. Shift Workers and Travelers: People who work overnight or travel across time zones frequently are at higher risk of experiencing Circadian Rhythm Disorders. They are trying to work and function when their body is programed to sleep. Sleep Deprived: Lack of sleep has a serious impact on your ability to pay attention or focus on a task. Consistently getting less than the average of 8 hours your body needs creates partial or cumulative sleep deprivation.   
Untreated Sleep Disorders: Sleep Apnea, Narcolepsy, R.L.S., and other sleep disorders (untreated) prevent a person from getting enough restful sleep. This leads to excessive daytime sleepiness and increases the risk for drowsy driving accidents by up to 7 times. Medications / Alcohol: Even over the counter medications can cause drowsiness. Medications that impair a drivers attention should have a warning label. Alcohol naturally makes you sleepy and on its own can cause accidents. Combined with excessive drowsiness its effects are amplified. Signs of Drowsy Driving: * You don't remember driving the last few miles * You may drift out of your preston * You are unable to focus and your thoughts wander * You may yawn more often than normal 
 * You have difficulty keeping your eyes open / nodding off * Missing traffic signs, speeding, or tailgating Prevention-  
Good sleep hygiene, lifestyle and behavioral choices have the most impact on drowsy driving. There is no substitute for sleep and the average person requires 8 hours nightly. If you find yourself driving drowsy, stop and sleep. Consider the sleep hygiene tips provided during your visit as well. Medication Refill Policy: Refills for all medications require 1 week advance notice. Please have your pharmacy fax a refill request. We are unable to fax, or call in \"controled substance\" medications and you will need to pick these prescriptions up from our office. [facility] Activation Thank you for requesting access to [facility]. Please follow the instructions below to securely access and download your online medical record. [facility] allows you to send messages to your doctor, view your test results, renew your prescriptions, schedule appointments, and more. How Do I Sign Up? 1. In your internet browser, go to [email or web address]. 2. Click on the First Time User? Click Here link in the Sign In box. You will see the New Member Sign Up page. 3. Enter your [facility] Access Code exactly as it appears below.  You will not need to use this code after youve completed the sign-up process. If you do not sign up before the expiration date, you must request a new code. [employer] Access Code: [email or web address] Expires: 10/29/2020  1:27 PM (This is the date your [employer] access code will ) 4. Enter the last four digits of your Social Security Number (xxxx) and Date of Birth (mm/dd/yyyy) as indicated and click Submit. You will be taken to the next sign-up page. 5. Create a [employer] ID. This will be your [employer] login ID and cannot be changed, so think of one that is secure and easy to remember. 6. Create a [employer] password. You can change your password at any time. 7. Enter your Password Reset Question and Answer. This can be used at a later time if you forget your password. 8. Enter your e-mail address. You will receive e-mail notification when new information is available in [address]. 9. Click Sign Up. You can now view and download portions of your medical record. 10. Click the Download Summary menu link to download a portable copy of your medical information. Additional Information If you have questions, please call [phone]. Remember, [employer] is NOT to be used for urgent needs. For medical emergencies, dial 911.

## 2020-09-25 ENCOUNTER — HOSPITAL ENCOUNTER (OUTPATIENT)
Dept: RADIATION THERAPY | Age: 70
Discharge: HOME | End: 2020-09-25

## 2021-09-13 ENCOUNTER — OFFICE VISIT (OUTPATIENT)
Dept: SLEEP MEDICINE | Age: 71
End: 2021-09-13
Payer: MEDICARE

## 2021-09-13 VITALS
HEART RATE: 72 BPM | SYSTOLIC BLOOD PRESSURE: 136 MMHG | WEIGHT: 295 LBS | DIASTOLIC BLOOD PRESSURE: 84 MMHG | BODY MASS INDEX: 38.92 KG/M2 | RESPIRATION RATE: 20 BRPM | OXYGEN SATURATION: 100 %

## 2021-09-13 DIAGNOSIS — G47.33 OSA (OBSTRUCTIVE SLEEP APNEA): Primary | ICD-10-CM

## 2021-09-13 DIAGNOSIS — Z86.73 H/O: STROKE: ICD-10-CM

## 2021-09-13 DIAGNOSIS — I10 ESSENTIAL HYPERTENSION: ICD-10-CM

## 2021-09-13 PROCEDURE — 99442 PR PHYS/QHP TELEPHONE EVALUATION 11-20 MIN: CPT | Performed by: NURSE PRACTITIONER

## 2021-09-13 NOTE — PATIENT INSTRUCTIONS
[address] [facility] [place], [address]  Tel.  [phone]  Fax. [address] [facility] [address]  [place], 200 [address] [facility]  Tel.  [phone]  Fax. [phone] [address]  Tel.  [phone]  Fax. [phone]     Learning About CPAP for Sleep Apnea  What is CPAP? CPAP is a small machine that you use at home every night while you sleep. It increases air pressure in your throat to keep your airway open. When you have sleep apnea, this can help you sleep better so you feel much better. CPAP stands for \"continuous positive airway pressure. \"  The CPAP machine will have one of the following:  · A mask that covers your nose and mouth  · Prongs that fit into your nose  · A mask that covers your nose only, the most common type. This type is called NCPAP. The N stands for \"nasal.\"  Why is it done? CPAP is usually the best treatment for obstructive sleep apnea. It is the first treatment choice and the most widely used. Your doctor may suggest CPAP if you have:  · Moderate to severe sleep apnea. · Sleep apnea and coronary artery disease (CAD) or heart failure. How does it help? · CPAP can help you have more normal sleep, so you feel less sleepy and more alert during the daytime. · CPAP may help keep heart failure or other heart problems from getting worse. · NCPAP may help lower your blood pressure. · If you use CPAP, your bed partner may also sleep better because you are not snoring or restless. What are the side effects? Some people who use CPAP have:  · A dry or stuffy nose and a sore throat. · Irritated skin on the face. · Sore eyes. · Bloating. If you have any of these problems, work with your doctor to fix them. Here are some things you can try:  · Be sure the mask or nasal prongs fit well. · See if your doctor can adjust the pressure of your CPAP. · If your nose is dry, try a humidifier.   · If your nose is runny or stuffy, try decongestant medicine or a steroid nasal spray. If these things do not help, you might try a different type of machine. Some machines have air pressure that adjusts on its own. Others have air pressures that are different when you breathe in than when you breathe out. This may reduce discomfort caused by too much pressure in your nose. Where can you learn more? Go to [employer].be  Enter [facility] in the search box to learn more about \"Learning About CPAP for Sleep Apnea. \"   © 9490-1284 Healthwise, Incorporated. Care instructions adapted under license by [employer] (which disclaims liability or warranty for this information). This care instruction is for use with your licensed healthcare professional. If you have questions about a medical condition or this instruction, always ask your healthcare professional. [address] any warranty or liability for your use of this information. Content Version: 4.3.58741; Last Revised: January 11, 2010  PROPER SLEEP HYGIENE    What to avoid  · Do not have drinks with caffeine, such as coffee or black tea, for 8 hours before bed. · Do not smoke or use other types of tobacco near bedtime. Nicotine is a stimulant and can keep you awake. · Avoid drinking alcohol late in the evening, because it can cause you to wake in the middle of the night. · Do not eat a big meal close to bedtime. If you are hungry, eat a light snack. · Do not drink a lot of water close to bedtime, because the need to urinate may wake you up during the night. · Do not read or watch TV in bed. Use the bed only for sleeping and sexual activity. What to try  · Go to bed at the same time every night, and wake up at the same time every morning. Do not take naps during the day. · Keep your bedroom quiet, dark, and cool. · Get regular exercise, but not within 3 to 4 hours of your bedtime. .  · Sleep on a comfortable pillow and mattress.   · If watching the clock makes you anxious, turn it facing away from you so you cannot see the time. · If you worry when you lie down, start a worry book. Well before bedtime, write down your worries, and then set the book and your concerns aside. · Try meditation or other relaxation techniques before you go to bed. · If you cannot fall asleep, get up and go to another room until you feel sleepy. Do something relaxing. Repeat your bedtime routine before you go to bed again. · Make your house quiet and calm about an hour before bedtime. Turn down the lights, turn off the TV, log off the computer, and turn down the volume on music. This can help you relax after a busy day. Drowsy Driving: The [employer] cites drowsiness as a causing factor in more than 214,409 police reported crashes annually, resulting in 76,000 injuries and 1,500 deaths. Other surveys suggest 55% of people polled have driven while drowsy in the past year, 23% had fallen asleep but not crashed, 3% crashed, and 2% had and accident due to drowsy driving. Who is at risk? Young Drivers: One study of drowsy driving accidents states that 55% of the drivers were under 25 years. Of those, 75% were male. Shift Workers and Travelers: People who work overnight or travel across time zones frequently are at higher risk of experiencing Circadian Rhythm Disorders. They are trying to work and function when their body is programed to sleep. Sleep Deprived: Lack of sleep has a serious impact on your ability to pay attention or focus on a task. Consistently getting less than the average of 8 hours your body needs creates partial or cumulative sleep deprivation. Untreated Sleep Disorders: Sleep Apnea, Narcolepsy, R.L.S., and other sleep disorders (untreated) prevent a person from getting enough restful sleep. This leads to excessive daytime sleepiness and increases the risk for drowsy driving accidents by up to 7 times.   Medications / Alcohol: Even over the counter medications can cause drowsiness. Medications that impair a drivers attention should have a warning label. Alcohol naturally makes you sleepy and on its own can cause accidents. Combined with excessive drowsiness its effects are amplified. Signs of Drowsy Driving:   * You don't remember driving the last few miles   * You may drift out of your preston   * You are unable to focus and your thoughts wander   * You may yawn more often than normal   * You have difficulty keeping your eyes open / nodding off   * Missing traffic signs, speeding, or tailgating  Prevention-   Good sleep hygiene, lifestyle and behavioral choices have the most impact on drowsy driving. There is no substitute for sleep and the average person requires 8 hours nightly. If you find yourself driving drowsy, stop and sleep. Consider the sleep hygiene tips provided during your visit as well. Medication Refill Policy: Refills for all medications require 1 week advance notice. Please have your pharmacy fax a refill request. We are unable to fax, or call in \"controled substance\" medications and you will need to pick these prescriptions up from our office. [email or web address] Activation    Thank you for requesting access to [email or web address]. Please follow the instructions below to securely access and download your online medical record. [email or web address] allows you to send messages to your doctor, view your test results, renew your prescriptions, schedule appointments, and more. How Do I Sign Up? 1. In your internet browser, go to [email or web address]. 2. Click on the First Time User? Click Here link in the Sign In box. You will see the New Member Sign Up page. 3. Enter your [email or web address] Access Code exactly as it appears below. You will not need to use this code after youve completed the sign-up process. If you do not sign up before the expiration date, you must request a new code.     [email or web address] Access Code: [email or web address]  Expires: 10/23/2021  2:35 PM (This is the date your Taamkru access code will )    4. Enter the last four digits of your Social Security Number (xxxx) and Date of Birth (mm/dd/yyyy) as indicated and click Submit. You will be taken to the next sign-up page. 5. Create a Taamkru ID. This will be your Taamkru login ID and cannot be changed, so think of one that is secure and easy to remember. 6. Create a Taamkru password. You can change your password at any time. 7. Enter your Password Reset Question and Answer. This can be used at a later time if you forget your password. 8. Enter your e-mail address. You will receive e-mail notification when new information is available in [address] 9. Click Sign Up. You can now view and download portions of your medical record. 10. Click the Download Summary menu link to download a portable copy of your medical information. Additional Information    If you have questions, please call [phone]. Remember, Taamkru is NOT to be used for urgent needs. For medical emergencies, dial 911.

## 2021-09-13 NOTE — PROGRESS NOTES
217 [facility] [place]. [place], [address]   Tel.  [phone]   Fax. 8[address] [facility]   [place], [address] [facility]   Tel.  [phone]   Fax. [phone] [address]   Tel.  [phone]   Fax. [phone] (: 1950) is a 66 y.o. male, established patient, seen for positive airway pressure follow-up, he was last seen by me on 2020, previously seen by Dr. Jennifer Vaughn 3/13/2019, prior notes reviewed in detail. In lab sleep test 2019 showed AHI of 53.4/hr with a lowest SaO2 of 81%. ASSESSMENT/PLAN:    ICD-10-CM ICD-9-CM    1. LUPE (obstructive sleep apnea)  G47.33 327.23 AMB SUPPLY ORDER   2. Essential hypertension  I10 401.9    3. H/O: stroke  Z86.73 V12.54    4. Adult BMI 39.0-39.9 kg/sq m  Z68.39 V85.39        AHI = 53.4(2019). On Respironics APAP :  9-12 cmH2O. Set up 2019.    he is not compliant with PAP therapy although when used PAP shows benefit to the patient and remains necessary for control of his sleep apnea. There is continued clinical benefit from the hours of use demonstrated by AHI reduced from 53.4/hr to 1.9/hr. His device is affected by the Ryanne recall. Follow-up and Dispositions    · Return in about 3 months (around 2021) for follow up. 1. Sleep Apnea -  We have discussed the Ryanne Respironics device recall, the need to register the device with Ryanne, and I have advised positional therapy if PAP therapy is stopped. Phone number for device registration given, he will register his dreamstation. * He has his old Resmed S9 which was set at 7 cm H2O. He will bring it in to have the pressure changed to APAP 9-12 cm H2O. Office to call to schedule tech visit.     Orders Placed This Encounter    AMB SUPPLY ORDER     Diagnosis: (G47.33) LUPE (obstructive sleep apnea)  (primary encounter diagnosis)     Patient has old device - ResMed S9 that can be used until Ryanne device is replaced. Pressure change ResMed S9 APAP to 9-12 cmH2O. Patient to bring device in to sleep lab for changes. Burnette BoeckKATIE-BC; NPI: [id number]    Electronically signed. Date:- 09/13/21     * Re-enforced proper and regular cleaning for the device. We discussed the risk associated with use of cleaning devices and he will continue with use of dish soap and water as the appropriate cleaning method. * He was asked to contact our office for any problems regarding PAP therapy. 2. Hypertension -  continue on his current regimen, he will continue to monitor his BP and follow up with his PMD for reevaluation/adjustment of medications if warranted. I have reviewed the relationship between hypertension as it relates to sleep-disordered breathing. 3. H/O Stroke - continue on his current regimen. I have reviewed the relationship between stroke and sleep disordered breathing. 4. Encouraged continued weight management program through appropriate diet and exercise regimen as significant weight reduction has been shown to reduce severity of obstructive sleep apnea. SUBJECTIVE/OBJECTIVE:    He  is seen today for follow up on PAP device and reports no problems using the device. The following concerns reviewed:    Drowsiness no Problems exhaling no   Snoring no Forget to put on no   Mask Comfortable yes Can't fall asleep no   Dry Mouth no Mask falls off no   Air Leaking no Frequent awakenings no       He admits that his sleep has improved on PAP therapy using full face mask and heated tubing. He was not aware of the recall. He notes that he feels better when he uses the device but it is uncomfortable and noisy. He is interested in learning more about Inspire, we discussed the device and weight requirements. He does have his old ResMed S9 and is willing to use that for now, he will bring it in to have the settings updated.     Review of device download indicated:  Auto pressure: 9-12 cmH2O; Peak Avg Pressure: 10.9 cmH2O;  Avg. Device Pressure <= 90 %: 11.0 cmH2O    Average % Night in Large Leak:  0.0  % Used Days >= 4 hours: 29.  Avg hours used:  5:08. Therapy Apnea Index averaged over PAP use: 1.9 /hr which reflects improved sleep breathing condition. Latta Sleepiness Score: 5 and Modified F.O.S.Q. Score Total / 2: 18 which reflects improved sleep quality over therapy time. Sleep Review of Systems: notable for Negative difficulty falling asleep; Positive awakenings at night; Negative early morning headaches; Negative memory problems; Negative concentration issues; Negative chest pain; Negative shortness of breath; Negative significant joint pain at night; Negative significant muscle pain at night; Negative rashes or itching; Negative heartburn; Negative significant mood issues; daily afternoon naps per week    Vitals reported by patient   Patient-Reported Vitals 9/14/2020   Patient-Reported Weight 300 lb      Calculated BMI 39    Physical Exam not completed due to audio only visit. Pursuant to the emergency declaration under the [address] [place] [address], [address] [place] [address] authority and the [employer] and [employer] Act, this telephone encounter was conducted with patient's (and/or legal guardian's) consent, to reduce the risk of exposure to COVID-19 and provide necessary medical care. Services were provided through a telephone call to substitute for in-person encounter. I was in the office while conducting this encounter, patient located at their home or alternate location of their choice. Patient identification was verified at the start of the visit: YES using name and date of birth. Patient's phone number [phone] (home)  was confirmed for accuracy. He gives permission for messages regarding results and appointments to be left at that number.     On this date 09/13/21 I have spent 20 minutes reviewing previous notes, test results, and on an audio only visit with the patient discussing the diagnosis and importance of compliance with the treatment plan as well as documenting on the day of the visit. An electronic signature was used to authenticate this note.     -- Sonja Adams NP, [facility]  09/13/21

## 2021-09-15 ENCOUNTER — OFFICE VISIT (OUTPATIENT)
Dept: SLEEP MEDICINE | Age: 71
End: 2021-09-15

## 2021-09-15 DIAGNOSIS — G47.33 OSA (OBSTRUCTIVE SLEEP APNEA): Primary | ICD-10-CM

## 2021-09-23 ENCOUNTER — DOCUMENTATION ONLY (OUTPATIENT)
Dept: SLEEP MEDICINE | Age: 71
End: 2021-09-23

## 2022-01-03 ENCOUNTER — VIRTUAL VISIT (OUTPATIENT)
Dept: SLEEP MEDICINE | Age: 72
End: 2022-01-03
Payer: MEDICARE

## 2022-01-03 DIAGNOSIS — G47.33 OSA (OBSTRUCTIVE SLEEP APNEA): Primary | ICD-10-CM

## 2022-01-03 DIAGNOSIS — Z86.73 H/O: STROKE: ICD-10-CM

## 2022-01-03 DIAGNOSIS — I10 ESSENTIAL HYPERTENSION: ICD-10-CM

## 2022-01-03 PROCEDURE — 99442 PR PHYS/QHP TELEPHONE EVALUATION 11-20 MIN: CPT | Performed by: NURSE PRACTITIONER

## 2022-01-03 NOTE — PROGRESS NOTES
Hannah Villasenor (: 1950) is a 66 y.o. male, established patient, seen for positive airway pressure follow-up, he was last seen by me on 2021, previously seen by Dr. Dawn Jang 3/13/2019, prior notes reviewed in detail. In lab sleep test 2019 showed AHI of 53.4/hr with a lowest SaO2 of 81%.      ASSESSMENT/PLAN:    ICD-10-CM ICD-9-CM    1. LUPE (obstructive sleep apnea)  G47.33 327.23 AMB SUPPLY ORDER   2. Essential hypertension  I10 401.9    3. H/O: stroke  Z86.73 V12.54        AHI = 53.4(2019). On Respironics APAP :  9-12 cmH2O. Set up 2019. New device set up 2021    He is adherent with PAP therapy and PAP continues to benefit patient and remains necessary for control of his sleep apnea. He has been using old ResMed S9, recently received new DS2. Follow-up and Dispositions    · Return in about 6 months (around 7/3/2022) for 6 mo follow up . 1. Sleep Apnea -   Continue on current pressures. He has not started using the new Edgar Dreamstration 2 but is setting up. * Supplies ordered - full face mask and heated tubing    Orders Placed This Encounter    AMB SUPPLY ORDER     Diagnosis: (G47.33) LUPE (obstructive sleep apnea)  (primary encounter diagnosis)     EDGAR DREAMSTATION 2    Replacement Supplies for Positive Airway Pressure Therapy Device:   Duration of need: 99 months.  Full Face Mask 1 every 3 months.  Full Face Mask Cushion 1 per month.  Headgear 1 every 6 months.  Pos Airway pressure chin strap     Tubing with heating element 1 every 3 months. DREAMSTATION 2     Filter(s) Disposable 2 per month. DREAMSTATION 2   Filter(s) Non-Disposable 1 every 6 months. .   [address] [facility] for Humidifier (Replace) 1 every 6 months. DREAMSTATION 2      KEVIN Hubbard; NPI: [id number]    Electronically signed. Date:- 22       * Re-enforced proper and regular cleaning for the device.     * He was asked to contact our office for any problems regarding PAP therapy. 2. Hypertension -  continue on his current regimen, he will continue to monitor his BP and follow up with his PMD for reevaluation/adjustment of medications if warranted.  I have reviewed the relationship between hypertension as it relates to sleep-disordered breathing.     3. H/O Stroke - continue on his current regimen.  I have reviewed the relationship between stroke and sleep disordered breathing.     4. Encouraged continued weight management program through appropriate diet and exercise regimen as significant weight reduction has been shown to reduce severity of obstructive sleep apnea. SUBJECTIVE/OBJECTIVE:    He  is seen today for follow up on PAP device and reports no problems using the device. The following concerns reviewed:    Drowsiness sometime Problems exhaling no   Snoring no Forget to put on no   Mask Comfortable yes Can't fall asleep no   Dry Mouth no Mask falls off no   Air Leaking no Frequent awakenings no       He admits that his sleep has improved on PAP therapy using full face mask and heated tubing. He has not started using new DS2 yet but has device. He will call DME to alert the to new device. He has been doing well with old Resmed but notes he is tired some during the day. Review of device download not available from [employer] S9. He reports using device consistently. Moorcroft Sleepiness Score: (P) 7 and Modified F.O.S.Q. Score Total / 2: (P) 19.5 which reflects improved sleep quality over therapy time. Sleep Review of Systems: notable for Negative difficulty falling asleep; negative awakenings at night; Negative early morning headaches; Negative memory problems; Negative concentration issues;  Negative chest pain; Negative shortness of breath; Negative significant joint pain at night; Negative rashes or itching; Negative heartburn; Negative significant mood issues; daily afternoon naps per week    Vitals reported by patient Patient-Reported Vitals 1/3/2022   Patient-Reported Weight 295lb      Calculated BMI 38    Physical Exam not completed due to audio only visit. Pursuant to the emergency declaration under the [address] [place] [address] and the [employer] Act, this telephone encounter was conducted with patient's (and/or legal guardian's) consent, to reduce the risk of exposure to COVID-19 and provide necessary medical care. Services were provided through a telephone call to substitute for in-person encounter. I was at home while conducting this encounter, patient located at their home or alternate location of their choice. Patient identification was verified at the start of the visit: YES using name and date of birth. Patient's phone number [phone] (home)  was confirmed for accuracy. He gives permission for messages regarding results and appointments to be left at that number. On this date 01/03/22 I have spent 20 minutes reviewing previous notes, test results, and on an audio only visit with the patient discussing the diagnosis and importance of compliance with the treatment plan as well as documenting on the day of the visit. An electronic signature was used to authenticate this note.     -- Flaco Lam NP, [facility]  01/03/22

## 2022-01-03 NOTE — PATIENT INSTRUCTIONS
[address] [place], [address]  Tel.  [phone]  Fax. [address] [facility] [address]  [place], [address]  Tel.  [phone]  Fax. [phone] [address]  Tel.  [phone]  Fax. [phone]     Learning About CPAP for Sleep Apnea  What is CPAP? CPAP is a small machine that you use at home every night while you sleep. It increases air pressure in your throat to keep your airway open. When you have sleep apnea, this can help you sleep better so you feel much better. CPAP stands for \"continuous positive airway pressure. \"  The CPAP machine will have one of the following:  · A mask that covers your nose and mouth  · Prongs that fit into your nose  · A mask that covers your nose only, the most common type. This type is called NCPAP. The N stands for \"nasal.\"  Why is it done? CPAP is usually the best treatment for obstructive sleep apnea. It is the first treatment choice and the most widely used. Your doctor may suggest CPAP if you have:  · Moderate to severe sleep apnea. · Sleep apnea and coronary artery disease (CAD) or heart failure. How does it help? · CPAP can help you have more normal sleep, so you feel less sleepy and more alert during the daytime. · CPAP may help keep heart failure or other heart problems from getting worse. · NCPAP may help lower your blood pressure. · If you use CPAP, your bed partner may also sleep better because you are not snoring or restless. What are the side effects? Some people who use CPAP have:  · A dry or stuffy nose and a sore throat. · Irritated skin on the face. · Sore eyes. · Bloating. If you have any of these problems, work with your doctor to fix them. Here are some things you can try:  · Be sure the mask or nasal prongs fit well. · See if your doctor can adjust the pressure of your CPAP. · If your nose is dry, try a humidifier.   · If your nose is runny or stuffy, try decongestant medicine or a steroid nasal spray. If these things do not help, you might try a different type of machine. Some machines have air pressure that adjusts on its own. Others have air pressures that are different when you breathe in than when you breathe out. This may reduce discomfort caused by too much pressure in your nose. Where can you learn more? Go to Orth[employer].be  Enter Elsy Yogi in the search box to learn more about \"Learning About CPAP for Sleep Apnea. \"   © 7116-4729 Healthwise, Incorporated. Care instructions adapted under license by [facility] [employer] (which disclaims liability or warranty for this information). This care instruction is for use with your licensed healthcare professional. If you have questions about a medical condition or this instruction, always ask your healthcare professional. [address] any warranty or liability for your use of this information. Content Version: 6.3.98219; Last Revised: January 11, 2010  PROPER SLEEP HYGIENE    What to avoid  · Do not have drinks with caffeine, such as coffee or black tea, for 8 hours before bed. · Do not smoke or use other types of tobacco near bedtime. Nicotine is a stimulant and can keep you awake. · Avoid drinking alcohol late in the evening, because it can cause you to wake in the middle of the night. · Do not eat a big meal close to bedtime. If you are hungry, eat a light snack. · Do not drink a lot of water close to bedtime, because the need to urinate may wake you up during the night. · Do not read or watch TV in bed. Use the bed only for sleeping and sexual activity. What to try  · Go to bed at the same time every night, and wake up at the same time every morning. Do not take naps during the day. · Keep your bedroom quiet, dark, and cool. · Get regular exercise, but not within 3 to 4 hours of your bedtime. .  · Sleep on a comfortable pillow and mattress.   · If watching the clock makes you anxious, turn it facing away from you so you cannot see the time. · If you worry when you lie down, start a worry book. Well before bedtime, write down your worries, and then set the book and your concerns aside. · Try meditation or other relaxation techniques before you go to bed. · If you cannot fall asleep, get up and go to another room until you feel sleepy. Do something relaxing. Repeat your bedtime routine before you go to bed again. · Make your house quiet and calm about an hour before bedtime. Turn down the lights, turn off the TV, log off the computer, and turn down the volume on music. This can help you relax after a busy day. Drowsy Driving: The [employer] cites drowsiness as a causing factor in more than 398,076 police reported crashes annually, resulting in 76,000 injuries and 1,500 deaths. Other surveys suggest 55% of people polled have driven while drowsy in the past year, 23% had fallen asleep but not crashed, 3% crashed, and 2% had and accident due to drowsy driving. Who is at risk? Young Drivers: One study of drowsy driving accidents states that 55% of the drivers were under 25 years. Of those, 75% were male. Shift Workers and Travelers: People who work overnight or travel across time zones frequently are at higher risk of experiencing Circadian Rhythm Disorders. They are trying to work and function when their body is programed to sleep. Sleep Deprived: Lack of sleep has a serious impact on your ability to pay attention or focus on a task. Consistently getting less than the average of 8 hours your body needs creates partial or cumulative sleep deprivation. Untreated Sleep Disorders: Sleep Apnea, Narcolepsy, R.L.S., and other sleep disorders (untreated) prevent a person from getting enough restful sleep. This leads to excessive daytime sleepiness and increases the risk for drowsy driving accidents by up to 7 times.   Medications / Alcohol: Even over the counter medications can cause drowsiness. Medications that impair a drivers attention should have a warning label. Alcohol naturally makes you sleepy and on its own can cause accidents. Combined with excessive drowsiness its effects are amplified. Signs of Drowsy Driving:   * You don't remember driving the last few miles   * You may drift out of your [place]   * You are unable to focus and your thoughts wander   * You may yawn more often than normal   * You have difficulty keeping your eyes open / nodding off   * Missing traffic signs, speeding, or tailgating  Prevention-   Good sleep hygiene, lifestyle and behavioral choices have the most impact on drowsy driving. There is no substitute for sleep and the average person requires 8 hours nightly. If you find yourself driving drowsy, stop and sleep. Consider the sleep hygiene tips provided during your visit as well. Medication Refill Policy: Refills for all medications require 1 week advance notice. Please have your pharmacy fax a refill request. We are unable to fax, or call in \"controled substance\" medications and you will need to pick these prescriptions up from our office. [employer]Earth Activation    Thank you for requesting access to inEarth. Please follow the instructions below to securely access and download your online medical record. inEarth allows you to send messages to your doctor, view your test results, renew your prescriptions, schedule appointments, and more. How Do I Sign Up? 1. In your internet browser, go to [email or web address]. 2. Click on the First Time User? Click Here link in the Sign In box. You will see the New Member Sign Up page. 3. Enter your inEarth Access Code exactly as it appears below. You will not need to use this code after youve completed the sign-up process. If you do not sign up before the expiration date, you must request a new code.     inEarth Access Code: [email or web address]  Expires: 2022  2:12 PM (This is the date your [employer] access code will )    4. Enter the last four digits of your Social Security Number (xxxx) and Date of Birth (mm/dd/yyyy) as indicated and click Submit. You will be taken to the next sign-up page. 5. Create a [employer] ID. This will be your [employer] login ID and cannot be changed, so think of one that is secure and easy to remember. 6. Create a [employer] password. You can change your password at any time. 7. Enter your Password Reset Question and Answer. This can be used at a later time if you forget your password. 8. Enter your e-mail address. You will receive e-mail notification when new information is available in [address] 9. Click Sign Up. You can now view and download portions of your medical record. 10. Click the Download Summary menu link to download a portable copy of your medical information. Additional Information    If you have questions, please call [phone]. Remember, [employer] is NOT to be used for urgent needs. For medical emergencies, dial 911.

## 2022-01-04 ENCOUNTER — DOCUMENTATION ONLY (OUTPATIENT)
Dept: SLEEP MEDICINE | Age: 72
End: 2022-01-04

## 2022-01-04 ENCOUNTER — TELEPHONE (OUTPATIENT)
Dept: SLEEP MEDICINE | Age: 72
End: 2022-01-04

## 2022-01-10 ENCOUNTER — TELEPHONE (OUTPATIENT)
Dept: SLEEP MEDICINE | Age: 72
End: 2022-01-10

## 2022-01-10 NOTE — TELEPHONE ENCOUNTER
----- Message from Noah Grande NP sent at 1/3/2022  4:00 PM EST -----  Regarding: DL PH new DS 2 in 1 week  Pt will start using new device, please DL to confirm we are getting data and AHI controlled.     Noah Grande NP, [facility]  01/03/22

## 2022-01-10 NOTE — TELEPHONE ENCOUNTER
Spoke to patient regarding use of DS2 device. He stated he had a terrible cold and congestion and waited to start using the new device. Patient plans to start using new device tonight, 1/10/22.   Will schedule for download two weeks out for Sheryle Lorenzo, NP.

## 2022-01-19 ENCOUNTER — HOSPITAL ENCOUNTER (OUTPATIENT)
Dept: RADIATION THERAPY | Age: 72
Discharge: HOME | End: 2022-01-19

## 2022-01-28 ENCOUNTER — TELEPHONE (OUTPATIENT)
Dept: SLEEP MEDICINE | Age: 72
End: 2022-01-28

## 2022-01-28 NOTE — TELEPHONE ENCOUNTER
----- Message from Lance Brambila NP sent at 1/3/2022  4:00 PM EST -----  Regarding: DL PH new DS 2 in 1 week  Pt will start using new device, please DL to confirm we are getting data and AHI controlled.     Lance Brambila NP, [facility]  01/03/22

## 2022-01-28 NOTE — TELEPHONE ENCOUNTER
Called patient to see if patient is using his DS2. No usage showing on Care [employer]. Not able to leave message on patient number.

## 2022-03-19 PROBLEM — E66.01 SEVERE OBESITY (HCC): Status: ACTIVE | Noted: 2019-09-11

## 2023-05-09 ENCOUNTER — TELEPHONE (OUTPATIENT)
Age: 73
End: 2023-05-09

## 2023-05-12 RX ORDER — FUROSEMIDE 20 MG/1
TABLET ORAL DAILY
COMMUNITY

## 2023-05-12 RX ORDER — LISINOPRIL 20 MG/1
20 TABLET ORAL DAILY
COMMUNITY

## 2023-05-12 RX ORDER — MELOXICAM 7.5 MG/1
TABLET ORAL DAILY
COMMUNITY
Start: 2015-11-19

## 2023-05-12 RX ORDER — FINASTERIDE 5 MG/1
5 TABLET, FILM COATED ORAL DAILY
COMMUNITY

## 2023-05-12 RX ORDER — OXYCODONE HYDROCHLORIDE 10 MG/1
TABLET ORAL EVERY 4 HOURS PRN
COMMUNITY
Start: 2015-11-19

## 2023-05-12 RX ORDER — CITALOPRAM 10 MG/1
10 TABLET ORAL DAILY
COMMUNITY

## 2023-05-12 RX ORDER — ATORVASTATIN CALCIUM 40 MG/1
TABLET, FILM COATED ORAL DAILY
COMMUNITY

## 2023-05-12 RX ORDER — CYCLOBENZAPRINE HCL 10 MG
TABLET ORAL 3 TIMES DAILY PRN
COMMUNITY
Start: 2015-11-19

## 2023-05-12 RX ORDER — ROSUVASTATIN CALCIUM 20 MG/1
20 TABLET, COATED ORAL NIGHTLY
COMMUNITY

## 2023-05-12 RX ORDER — AMLODIPINE BESYLATE 10 MG/1
TABLET ORAL DAILY
COMMUNITY

## 2023-05-12 RX ORDER — LEVOTHYROXINE SODIUM 0.03 MG/1
TABLET ORAL
COMMUNITY

## 2023-05-12 RX ORDER — ASPIRIN 81 MG/1
TABLET ORAL EVERY 8 HOURS
COMMUNITY

## 2023-05-12 RX ORDER — HYDROCHLOROTHIAZIDE 12.5 MG/1
12.5 TABLET ORAL DAILY
COMMUNITY

## 2023-05-12 RX ORDER — ACETAMINOPHEN 325 MG/1
TABLET ORAL EVERY 4 HOURS PRN
COMMUNITY

## 2023-05-12 RX ORDER — ARIPIPRAZOLE 15 MG/1
15 TABLET ORAL DAILY
COMMUNITY

## 2024-10-17 ENCOUNTER — CLINICAL DOCUMENTATION (OUTPATIENT)
Age: 74
End: 2024-10-17

## 2024-10-19 ENCOUNTER — HOSPITAL ENCOUNTER (INPATIENT)
Facility: HOSPITAL | Age: 74
LOS: 9 days | Discharge: NURSING, REHAB OR LONG-TERM CARE | End: 2024-10-31
Attending: STUDENT IN AN ORGANIZED HEALTH CARE EDUCATION/TRAINING PROGRAM | Admitting: FAMILY MEDICINE
Payer: MEDICARE

## 2024-10-19 ENCOUNTER — APPOINTMENT (OUTPATIENT)
Facility: HOSPITAL | Age: 74
End: 2024-10-19
Payer: MEDICARE

## 2024-10-19 DIAGNOSIS — F31.2 BIPOLAR AFFECTIVE DISORDER, CURRENTLY MANIC, SEVERE, WITH PSYCHOTIC FEATURES (HCC): ICD-10-CM

## 2024-10-19 DIAGNOSIS — N17.9 AKI (ACUTE KIDNEY INJURY) (HCC): ICD-10-CM

## 2024-10-19 DIAGNOSIS — I63.9 CEREBROVASCULAR ACCIDENT (CVA), UNSPECIFIED MECHANISM (HCC): ICD-10-CM

## 2024-10-19 DIAGNOSIS — M62.82 NON-TRAUMATIC RHABDOMYOLYSIS: ICD-10-CM

## 2024-10-19 DIAGNOSIS — R07.9 CHEST PAIN, UNSPECIFIED TYPE: Primary | ICD-10-CM

## 2024-10-19 LAB
ALBUMIN SERPL-MCNC: 4.1 G/DL (ref 3.5–5)
ALBUMIN/GLOB SERPL: 1.1 (ref 1.1–2.2)
ALP SERPL-CCNC: 94 U/L (ref 45–117)
ALT SERPL-CCNC: 67 U/L (ref 12–78)
ANION GAP SERPL CALC-SCNC: 5 MMOL/L (ref 2–12)
AST SERPL-CCNC: 112 U/L (ref 15–37)
BASOPHILS # BLD: 0 K/UL (ref 0–0.1)
BASOPHILS NFR BLD: 1 % (ref 0–1)
BILIRUB SERPL-MCNC: 0.6 MG/DL (ref 0.2–1)
BUN SERPL-MCNC: 19 MG/DL (ref 6–20)
BUN/CREAT SERPL: 13 (ref 12–20)
CALCIUM SERPL-MCNC: 9.1 MG/DL (ref 8.5–10.1)
CHLORIDE SERPL-SCNC: 99 MMOL/L (ref 97–108)
CO2 SERPL-SCNC: 28 MMOL/L (ref 21–32)
COMMENT:: NORMAL
CREAT SERPL-MCNC: 1.44 MG/DL (ref 0.7–1.3)
DIFFERENTIAL METHOD BLD: ABNORMAL
EKG ATRIAL RATE: 87 BPM
EKG DIAGNOSIS: NORMAL
EKG P AXIS: 37 DEGREES
EKG P-R INTERVAL: 198 MS
EKG Q-T INTERVAL: 406 MS
EKG QRS DURATION: 90 MS
EKG QTC CALCULATION (BAZETT): 488 MS
EKG R AXIS: -25 DEGREES
EKG T AXIS: 59 DEGREES
EKG VENTRICULAR RATE: 87 BPM
EOSINOPHIL # BLD: 0.1 K/UL (ref 0–0.4)
EOSINOPHIL NFR BLD: 2 % (ref 0–7)
ERYTHROCYTE [DISTWIDTH] IN BLOOD BY AUTOMATED COUNT: 12.7 % (ref 11.5–14.5)
GLOBULIN SER CALC-MCNC: 3.8 G/DL (ref 2–4)
GLUCOSE SERPL-MCNC: 112 MG/DL (ref 65–100)
HCT VFR BLD AUTO: 42.3 % (ref 36.6–50.3)
HGB BLD-MCNC: 13.8 G/DL (ref 12.1–17)
IMM GRANULOCYTES # BLD AUTO: 0 K/UL (ref 0–0.04)
IMM GRANULOCYTES NFR BLD AUTO: 0 % (ref 0–0.5)
LIPASE SERPL-CCNC: 39 U/L (ref 13–75)
LYMPHOCYTES # BLD: 2 K/UL (ref 0.8–3.5)
LYMPHOCYTES NFR BLD: 28 % (ref 12–49)
MCH RBC QN AUTO: 29.1 PG (ref 26–34)
MCHC RBC AUTO-ENTMCNC: 32.6 G/DL (ref 30–36.5)
MCV RBC AUTO: 89.2 FL (ref 80–99)
MONOCYTES # BLD: 1 K/UL (ref 0–1)
MONOCYTES NFR BLD: 14 % (ref 5–13)
NEUTS SEG # BLD: 3.9 K/UL (ref 1.8–8)
NEUTS SEG NFR BLD: 55 % (ref 32–75)
NRBC # BLD: 0 K/UL (ref 0–0.01)
NRBC BLD-RTO: 0 PER 100 WBC
NT PRO BNP: 109 PG/ML
PLATELET # BLD AUTO: 172 K/UL (ref 150–400)
PMV BLD AUTO: 11.1 FL (ref 8.9–12.9)
POTASSIUM SERPL-SCNC: 3.7 MMOL/L (ref 3.5–5.1)
PROT SERPL-MCNC: 7.9 G/DL (ref 6.4–8.2)
RBC # BLD AUTO: 4.74 M/UL (ref 4.1–5.7)
SODIUM SERPL-SCNC: 132 MMOL/L (ref 136–145)
SPECIMEN HOLD: NORMAL
TROPONIN I SERPL HS-MCNC: 37 NG/L (ref 0–76)
TROPONIN I SERPL HS-MCNC: 38 NG/L (ref 0–76)
WBC # BLD AUTO: 7.1 K/UL (ref 4.1–11.1)

## 2024-10-19 PROCEDURE — 83880 ASSAY OF NATRIURETIC PEPTIDE: CPT

## 2024-10-19 PROCEDURE — 6370000000 HC RX 637 (ALT 250 FOR IP): Performed by: STUDENT IN AN ORGANIZED HEALTH CARE EDUCATION/TRAINING PROGRAM

## 2024-10-19 PROCEDURE — 6360000002 HC RX W HCPCS: Performed by: STUDENT IN AN ORGANIZED HEALTH CARE EDUCATION/TRAINING PROGRAM

## 2024-10-19 PROCEDURE — 36415 COLL VENOUS BLD VENIPUNCTURE: CPT

## 2024-10-19 PROCEDURE — 99285 EMERGENCY DEPT VISIT HI MDM: CPT

## 2024-10-19 PROCEDURE — 71045 X-RAY EXAM CHEST 1 VIEW: CPT

## 2024-10-19 PROCEDURE — 85025 COMPLETE CBC W/AUTO DIFF WBC: CPT

## 2024-10-19 PROCEDURE — 6360000004 HC RX CONTRAST MEDICATION: Performed by: RADIOLOGY

## 2024-10-19 PROCEDURE — 90791 PSYCH DIAGNOSTIC EVALUATION: CPT

## 2024-10-19 PROCEDURE — 84484 ASSAY OF TROPONIN QUANT: CPT

## 2024-10-19 PROCEDURE — 82077 ASSAY SPEC XCP UR&BREATH IA: CPT

## 2024-10-19 PROCEDURE — 74174 CTA ABD&PLVS W/CONTRAST: CPT

## 2024-10-19 PROCEDURE — 83690 ASSAY OF LIPASE: CPT

## 2024-10-19 PROCEDURE — 84443 ASSAY THYROID STIM HORMONE: CPT

## 2024-10-19 PROCEDURE — 93005 ELECTROCARDIOGRAM TRACING: CPT | Performed by: STUDENT IN AN ORGANIZED HEALTH CARE EDUCATION/TRAINING PROGRAM

## 2024-10-19 PROCEDURE — 80053 COMPREHEN METABOLIC PANEL: CPT

## 2024-10-19 PROCEDURE — 84439 ASSAY OF FREE THYROXINE: CPT

## 2024-10-19 PROCEDURE — 96374 THER/PROPH/DIAG INJ IV PUSH: CPT

## 2024-10-19 RX ORDER — FUROSEMIDE 20 MG/1
20 TABLET ORAL DAILY
Status: DISCONTINUED | OUTPATIENT
Start: 2024-10-20 | End: 2024-10-31 | Stop reason: HOSPADM

## 2024-10-19 RX ORDER — IOPAMIDOL 755 MG/ML
100 INJECTION, SOLUTION INTRAVASCULAR
Status: COMPLETED | OUTPATIENT
Start: 2024-10-19 | End: 2024-10-19

## 2024-10-19 RX ORDER — NITROGLYCERIN 0.4 MG/1
0.4 TABLET SUBLINGUAL ONCE
Status: DISCONTINUED | OUTPATIENT
Start: 2024-10-19 | End: 2024-10-31 | Stop reason: HOSPADM

## 2024-10-19 RX ORDER — HYDROCHLOROTHIAZIDE 25 MG/1
12.5 TABLET ORAL
Status: COMPLETED | OUTPATIENT
Start: 2024-10-19 | End: 2024-10-19

## 2024-10-19 RX ORDER — HYDROCHLOROTHIAZIDE 25 MG/1
12.5 TABLET ORAL DAILY
Status: DISCONTINUED | OUTPATIENT
Start: 2024-10-20 | End: 2024-10-31 | Stop reason: HOSPADM

## 2024-10-19 RX ORDER — AMLODIPINE BESYLATE 5 MG/1
10 TABLET ORAL DAILY
Status: DISCONTINUED | OUTPATIENT
Start: 2024-10-20 | End: 2024-10-28

## 2024-10-19 RX ORDER — LISINOPRIL 20 MG/1
20 TABLET ORAL DAILY
Status: DISCONTINUED | OUTPATIENT
Start: 2024-10-20 | End: 2024-10-29

## 2024-10-19 RX ORDER — AMLODIPINE BESYLATE 5 MG/1
10 TABLET ORAL
Status: COMPLETED | OUTPATIENT
Start: 2024-10-19 | End: 2024-10-19

## 2024-10-19 RX ORDER — ONDANSETRON 2 MG/ML
4 INJECTION INTRAMUSCULAR; INTRAVENOUS ONCE
Status: COMPLETED | OUTPATIENT
Start: 2024-10-19 | End: 2024-10-19

## 2024-10-19 RX ADMIN — ONDANSETRON 4 MG: 2 INJECTION INTRAMUSCULAR; INTRAVENOUS at 08:13

## 2024-10-19 RX ADMIN — AMLODIPINE BESYLATE 10 MG: 5 TABLET ORAL at 08:17

## 2024-10-19 RX ADMIN — IOPAMIDOL 100 ML: 755 INJECTION, SOLUTION INTRAVENOUS at 09:48

## 2024-10-19 RX ADMIN — HYDROCHLOROTHIAZIDE 12.5 MG: 25 TABLET ORAL at 08:17

## 2024-10-19 ASSESSMENT — PAIN - FUNCTIONAL ASSESSMENT
PAIN_FUNCTIONAL_ASSESSMENT: ACTIVITIES ARE NOT PREVENTED
PAIN_FUNCTIONAL_ASSESSMENT: 0-10

## 2024-10-19 ASSESSMENT — PAIN DESCRIPTION - LOCATION
LOCATION: GENERALIZED
LOCATION: CHEST

## 2024-10-19 ASSESSMENT — PAIN DESCRIPTION - ONSET: ONSET: ON-GOING

## 2024-10-19 ASSESSMENT — PAIN DESCRIPTION - ORIENTATION: ORIENTATION: MID

## 2024-10-19 ASSESSMENT — PAIN DESCRIPTION - DESCRIPTORS
DESCRIPTORS: PRESSURE
DESCRIPTORS: ACHING

## 2024-10-19 ASSESSMENT — PAIN SCALES - GENERAL
PAINLEVEL_OUTOF10: 8
PAINLEVEL_OUTOF10: 3

## 2024-10-19 ASSESSMENT — PAIN DESCRIPTION - PAIN TYPE: TYPE: ACUTE PAIN

## 2024-10-19 ASSESSMENT — PAIN DESCRIPTION - FREQUENCY: FREQUENCY: CONTINUOUS

## 2024-10-19 NOTE — CARE COORDINATION
3:03 PM  CM consult received.  Informed patient brought in for chest pain, workup negative.  Spouse is currently at [facility] for skilled rehab services.  Patient was visiting when symptoms began.  Unclear baseline but he is somewhat nonsensical here and disoriented.     CM placed follow-up call to step-daughterJasmin [phone]/ [phone] to obtain updates on patient's baseline.  No one present at the time of call.    CM left a message requesting a return call.  Updates provided to RN.    4:23 PM  CM placed follow-up calls to patient's step daughterJasmin.  No one present at the time of call.  CM left a message requesting a return call to RN station for patient follow-up.  CM will continue to follow and assist with JIM needs as they arise.    Julienne Suh, HERMELINDO/FITO

## 2024-10-19 NOTE — PROGRESS NOTES
Assumed care of pt from  BLANCA Marinelli. Report included SBAR, all pertinent pt information and current pt status. All questions concerns were addressed with this RN and BLANCA Thompson. Pt resting comfortably in bed, no complaints at this time.   [id number]-Cqabhlcmcm OBS or IP - low complexity OR 25-34 mins

## 2024-10-19 NOTE — ED NOTES
Verbal shift change report given to Merna Lima (oncoming nurse) by Donna (offgoing nurse). Report included the following information Nurse Handoff Report, ED Encounter Summary, ED SBAR, and MAR.

## 2024-10-19 NOTE — PROGRESS NOTES
Pt coughing up copious amounts of white, frothy sputum. Dr Valdez notified, order for scans received. Pt managing secretions. Remains 98% on ra, denies cp, SOB or other issues.

## 2024-10-19 NOTE — ED TRIAGE NOTES
Pt arrives via EMS visiting wife at [facility], pt c/o nausea chest pain. EMS arrived and performed EKG showing abnormal rhythm, , 87 HR, 97 ra, feeling \"distended and RL abdominal pain\". Pt is Aox4 in no apparent distress. Denies SOB or current pain.

## 2024-10-19 NOTE — ED NOTES
The patient was signed out to me by Dr. Valdez  74 y.o.  male hx of HTN, DM, prior stroke here today with chest pain/abd pain. Cleared from this perspective however as being discharged noted to have some ?confusion, odd affect. Awaiting family to pick pt up/confirm his baseline.    CM unable to get in touch with family  I have called family several times but no answer  I did speak with his wife Sandra when she called but she wasn't sure if he's at baseline or not and refers me to speak to his primary care doctor    I have had multiple interactions with pt. He is A+Ox4. Has some hyper-Hoahaoism comments and seems  to have a bit odd of an affect although seems to answer most questions right. He has his car at a nursing home down the road. Awaiting family to assist with disposition.  He tells me he thinks his sister in law may be able to pick him up after she gets off work @[employer] [facility]    I have spoken with Diana, his NISHA, [phone]  She states he has episodes like this (hyper Hoahaoism, quoting bible, etc).   She will pick him up, probably tomorrow early in AM    Signed out to Dr. Dash pending family  in AM       Cesar George, DO  10/19/24 [id number]

## 2024-10-19 NOTE — ED TRIAGE NOTES
Pt arrives via EMS with cc of CP and abd pain that woke him up. Endorses nausea. Denies dizziness, SOB. Given 324 aspirin en route

## 2024-10-19 NOTE — ED NOTES
Patient agitated, wants to go home. Removed his I.Vs and disconnected self from the monitor. Talked to by this RN and agreed to have the monitor back.

## 2024-10-19 NOTE — PROGRESS NOTES
Pt has multiple  pleasant family members calling, pt has asked that only Jasmin (stepdaughter) be given information. [phone]. Staff aware.

## 2024-10-19 NOTE — PROGRESS NOTES
This RN attempted to call step daughter to verify if pt is at baseline mentation. Pt has intermittent bouts of confusion here in ED stating \"Harry didn't let the demons chasing me win\" and \"I don't wanna be here but I want to stay and be treated\".     No answer and no vm left due to HIIPA.

## 2024-10-19 NOTE — DISCHARGE INSTRUCTIONS
Discharge Instructions       PATIENT ID: Mani Estrella  MRN: [id number]   YOB: 1950    DATE OF ADMISSION: 10/19/2024   DATE OF DISCHARGE: 10/31/2024    PRIMARY CARE PROVIDER: Dionne Mcrae     ATTENDING PHYSICIAN: Angelina Roberts MD   DISCHARGING PROVIDER: MU Chaparro CNP    To contact this individual call [phone] and ask the  to page.   If unavailable ask to be transferred the Adult Hospitalist Department.    DISCHARGE DIAGNOSES pulmonary embolism, acute CVA    CONSULTATIONS: [unfilled]    PROCEDURES/SURGERIES: * No surgery found *    PENDING TEST RESULTS:   At the time of discharge the following test results are still pending: holter monitor    FOLLOW UP APPOINTMENTS:   [facility]     ADDITIONAL CARE RECOMMENDATIONS: none    DIET: cardiac diet  Oral Nutritional Supplements: Ensure High Proonce a day    ACTIVITY: activity as tolerated    WOUND CARE: none    EQUIPMENT needed: none      DISCHARGE MEDICATIONS:   See Medication Reconciliation Form    It is important that you take the medication exactly as they are prescribed.   Keep your medication in the bottles provided by the pharmacist and keep a list of the medication names, dosages, and times to be taken in your wallet.   Do not take other medications without consulting your doctor.       NOTIFY YOUR PHYSICIAN FOR ANY OF THE FOLLOWING:   Fever over 101 degrees for 24 hours.   Chest pain, shortness of breath, fever, chills, nausea, vomiting, diarrhea, change in mentation, falling, weakness, bleeding. Severe pain or pain not relieved by medications.  Or, any other signs or symptoms that you may have questions about.      DISPOSITION:    Home With:   OT  PT  HH  RN      x SNF/Inpatient Rehab/LTAC    Independent/assisted living    Hospice    Other:     CDMP Checked:   Yes y     PROBLEM LIST Updated:  Yes y       Signed:   MU Chaparro CNP  10/31/2024  10:44 AM

## 2024-10-19 NOTE — ED NOTES
Pt was going to be discharged earlier in the day. This RN noticed that although he was Aox4 (Name, , at [place], here for chest pain, knows year) he states \"I am exercising when he is lying on the bed\" He states, \"This water has been microwaved\" when I have given him water. The water to me feels at room temperature. Pt states, \"You can't feel it, it is hot\"     Case Management has been contacted    This RN spoke to wife and asked if this was his normal behavior she stated, \" He is dealing with high blood pressure and bipolar and he needs to go to his doctor on [address]\" I asked again if this was his normal behavior and she did not give me a clear response.

## 2024-10-19 NOTE — ED PROVIDER NOTES
Weight         1.854 m (6' 1\") --             Body mass index is 38.92 kg/m².    Physical Exam  Vitals and nursing note reviewed.   Constitutional:       Appearance: Normal appearance.   HENT:      Head: Normocephalic and atraumatic.      Right Ear: External ear normal.      Left Ear: External ear normal.      Nose: Nose normal.      Mouth/Throat:      Mouth: Mucous membranes are moist.      Pharynx: Oropharynx is clear.   Eyes:      Extraocular Movements: Extraocular movements intact.      Conjunctiva/sclera: Conjunctivae normal.   Cardiovascular:      Rate and Rhythm: Normal rate and regular rhythm.      Pulses: Normal pulses.      Heart sounds: Normal heart sounds.   Pulmonary:      Effort: Pulmonary effort is normal.      Breath sounds: Normal breath sounds.   Abdominal:      Palpations: Abdomen is soft.      Tenderness: There is no abdominal tenderness.   Musculoskeletal:         General: No tenderness. Normal range of motion.      Cervical back: Normal range of motion.   Skin:     General: Skin is warm and dry.   Neurological:      General: No focal deficit present.      Mental Status: He is alert and oriented to person, place, and time.   Psychiatric:         Mood and Affect: Mood normal.         Behavior: Behavior normal.         All other labs were within normal range or not returned as of this dictation.    EMERGENCY DEPARTMENT COURSE and DIFFERENTIAL DIAGNOSIS/MDM:   Vitals:    Vitals:    10/19/24 0720   BP: (!) 185/99   Pulse: 88   Resp: 16   Temp: 99 °F (37.2 °C)   TempSrc: Oral   SpO2: 99%   Height: 1.854 m (6' 1\")       Medical Decision Making  74-year-old male presents to the ED with chest pain, nausea, vomiting.  Patient hypertensive to 185/99 in the emergency room.  Otherwise stable vital signs.  Exam is benign.  Differential includes ACS, gastritis, chest wall pain, viral illness, pneumonia.  Checking chest x-ray, basic labs, troponin.  EKG without evidence of acute ischemia.  Will start home  blood pressure medications, treat with Zofran, and reevaluate.  Dispo pending labs, imaging, and response to treatment.    Amount and/or Complexity of Data Reviewed  Labs: ordered.  Radiology: ordered.  ECG/medicine tests: ordered.    Risk  Prescription drug management.            REASSESSMENT     ED Course as of 10/19/24 1402   Sat Oct 19, 2024   0731 ED EKG interpretation:7:31 AM  Rhythm: normal sinus rhythm;  Rate (approx.): 87; Axis: normal; QRS interval: normal ; ST/T wave: normal; Other findings: normal.    [JW]   0904 Patient noted to be coughing up copious amounts of sputum, somewhat frothy, also complaining of abdominal pain and remains hypertensive.  Chest x-ray with evidence of edema.  Denies history of heart failure.  Checking proBNP and also obtaining CT chest, abdomen, and pelvis to evaluate for aspiration pneumonia, aortic pathology. [JW]      ED Course User Index  [JW] Reggie Valdez MD         CONSULTS:  None    PROCEDURES:  Unless otherwise noted below, none     Procedures    2:02 PM  Patient reevaluated.  He is feeling better, his coughing is completely resolved, he denies any symptoms at this point.  Repeat troponin negative, proBNP negative, do not feel inpatient management is warranted.  Patient expresses desire to go home.  Will discharge.    2:37 PM  Attempted to discharge patient.  However, we are not able to reach any family and his wife is currently at [place].  Talking with the patient he has a somewhat odd affect and displays hyperreligiosity, talking about the devil being after him and is not oriented to time.  Unclear baseline, no significant abnormalities on his workup except for somewhat abnormal renal function which is likely chronic.  Case management consult placed to assist with reaching family.    3:00 PM  Signout given to Dr. George. History, results, and plan discussed. Awaiting further results and/or response to treatment.    Reggie Valdez MD    (Please note that

## 2024-10-20 ENCOUNTER — APPOINTMENT (OUTPATIENT)
Facility: HOSPITAL | Age: 74
End: 2024-10-20
Payer: MEDICARE

## 2024-10-20 LAB
APPEARANCE UR: CLEAR
BACTERIA URNS QL MICRO: NEGATIVE /HPF
BILIRUB UR QL: NEGATIVE
COLOR UR: NORMAL
COMMENT:: NORMAL
EPITH CASTS URNS QL MICRO: NORMAL /LPF
GLUCOSE UR STRIP.AUTO-MCNC: NEGATIVE MG/DL
HGB UR QL STRIP: NEGATIVE
HYALINE CASTS URNS QL MICRO: NORMAL /LPF (ref 0–5)
KETONES UR QL STRIP.AUTO: NEGATIVE MG/DL
LEUKOCYTE ESTERASE UR QL STRIP.AUTO: NEGATIVE
NITRITE UR QL STRIP.AUTO: NEGATIVE
PH UR STRIP: 6 (ref 5–8)
PROT UR STRIP-MCNC: NEGATIVE MG/DL
RBC #/AREA URNS HPF: NORMAL /HPF (ref 0–5)
SP GR UR REFRACTOMETRY: 1.02 (ref 1–1.03)
SPECIMEN HOLD: NORMAL
T4 FREE SERPL-MCNC: 1.4 NG/DL (ref 0.8–1.5)
TSH SERPL DL<=0.05 MIU/L-ACNC: 1.06 UIU/ML (ref 0.36–3.74)
UROBILINOGEN UR QL STRIP.AUTO: 1 EU/DL (ref 0.2–1)
WBC URNS QL MICRO: NORMAL /HPF (ref 0–4)

## 2024-10-20 PROCEDURE — 70450 CT HEAD/BRAIN W/O DYE: CPT

## 2024-10-20 PROCEDURE — 81001 URINALYSIS AUTO W/SCOPE: CPT

## 2024-10-20 PROCEDURE — 80307 DRUG TEST PRSMV CHEM ANLYZR: CPT

## 2024-10-20 PROCEDURE — 6360000002 HC RX W HCPCS: Performed by: EMERGENCY MEDICINE

## 2024-10-20 PROCEDURE — 6370000000 HC RX 637 (ALT 250 FOR IP): Performed by: STUDENT IN AN ORGANIZED HEALTH CARE EDUCATION/TRAINING PROGRAM

## 2024-10-20 PROCEDURE — 93005 ELECTROCARDIOGRAM TRACING: CPT | Performed by: STUDENT IN AN ORGANIZED HEALTH CARE EDUCATION/TRAINING PROGRAM

## 2024-10-20 PROCEDURE — 96372 THER/PROPH/DIAG INJ SC/IM: CPT

## 2024-10-20 RX ORDER — HYDROXYZINE HYDROCHLORIDE 25 MG/1
25 TABLET, FILM COATED ORAL NIGHTLY
Status: ON HOLD | COMMUNITY
End: 2024-10-29

## 2024-10-20 RX ORDER — ACETAMINOPHEN 500 MG
1000 TABLET ORAL ONCE
Status: COMPLETED | OUTPATIENT
Start: 2024-10-20 | End: 2024-10-20

## 2024-10-20 RX ORDER — HALOPERIDOL 5 MG/ML
5 INJECTION INTRAMUSCULAR PRN
Status: COMPLETED | OUTPATIENT
Start: 2024-10-20 | End: 2024-10-20

## 2024-10-20 RX ORDER — HALOPERIDOL 5 MG/1
5 TABLET ORAL PRN
Status: COMPLETED | OUTPATIENT
Start: 2024-10-20 | End: 2024-10-20

## 2024-10-20 RX ORDER — LORAZEPAM 2 MG/ML
2 INJECTION INTRAMUSCULAR EVERY 4 HOURS PRN
Status: DISCONTINUED | OUTPATIENT
Start: 2024-10-20 | End: 2024-10-23

## 2024-10-20 RX ORDER — DILTIAZEM HYDROCHLORIDE EXTENDED-RELEASE TABLETS 300 MG/1
300 TABLET, EXTENDED RELEASE ORAL NIGHTLY
COMMUNITY

## 2024-10-20 RX ORDER — LORAZEPAM 1 MG/1
2 TABLET ORAL EVERY 4 HOURS PRN
Status: DISCONTINUED | OUTPATIENT
Start: 2024-10-20 | End: 2024-10-23

## 2024-10-20 RX ORDER — GLIPIZIDE 5 MG/1
5 TABLET ORAL DAILY
COMMUNITY

## 2024-10-20 RX ADMIN — HALOPERIDOL LACTATE 5 MG: 5 INJECTION, SOLUTION INTRAMUSCULAR at 18:46

## 2024-10-20 RX ADMIN — HALOPERIDOL LACTATE 5 MG: 5 INJECTION, SOLUTION INTRAMUSCULAR at 11:33

## 2024-10-20 RX ADMIN — AMLODIPINE BESYLATE 10 MG: 5 TABLET ORAL at 19:36

## 2024-10-20 RX ADMIN — ACETAMINOPHEN 1000 MG: 500 TABLET ORAL at 05:56

## 2024-10-20 ASSESSMENT — PAIN DESCRIPTION - DESCRIPTORS
DESCRIPTORS: ACHING
DESCRIPTORS: OTHER (COMMENT)

## 2024-10-20 ASSESSMENT — PAIN SCALES - GENERAL
PAINLEVEL_OUTOF10: 0
PAINLEVEL_OUTOF10: 10
PAINLEVEL_OUTOF10: 8
PAINLEVEL_OUTOF10: 10

## 2024-10-20 ASSESSMENT — PAIN DESCRIPTION - LOCATION
LOCATION: GENERALIZED
LOCATION: BACK
LOCATION: BACK

## 2024-10-20 ASSESSMENT — PAIN DESCRIPTION - PAIN TYPE: TYPE: ACUTE PAIN

## 2024-10-20 ASSESSMENT — PAIN - FUNCTIONAL ASSESSMENT
PAIN_FUNCTIONAL_ASSESSMENT: 0-10
PAIN_FUNCTIONAL_ASSESSMENT: ACTIVITIES ARE NOT PREVENTED

## 2024-10-20 ASSESSMENT — PAIN DESCRIPTION - ORIENTATION
ORIENTATION: MID
ORIENTATION: UPPER;LOWER
ORIENTATION: UPPER;LOWER

## 2024-10-20 ASSESSMENT — PAIN DESCRIPTION - ONSET: ONSET: ON-GOING

## 2024-10-20 NOTE — CARE COORDINATION
9:16 AM  CM received a call from patient's step daughter, Jasmin Groves [phone] and patient's spouse, Sandra Estrella.  They were asking for current updates.  They are aware that sister-in-law,Reba is planning to  patient in 3 hours and will transport home if ready for discharge.    CM was able to address current concerns with patient's behavior while here at [facility].  Spouse kept indicating that patient is trying to get back to her.  CM uncertain if family is fully aware of understanding patients current deficits.  CM did make them aware that BSMART has been consulted for an evaluation.    CM transferred call to ED for family to speak with RN and Attending.    CM will continue to follow and assist with JIM needs as they arise.    HERMELINDO Robledo/FITO

## 2024-10-20 NOTE — ED NOTES
Bedside and Verbal shift change report given to Lisa RN (oncoming nurse) by Pretty RN (offgoing nurse). Report included the following information ED Encounter Summary, ED SBAR, MAR, and Recent Results.

## 2024-10-20 NOTE — ED NOTES
Spoke to the pt's sister in law Reba. Pt says she should be here in about 3 hours. If pt is being discharged, she will transport him from the hospital

## 2024-10-20 NOTE — ED NOTES
Pt refusing to be hooked back up to cardiac monitor and refusing to have vital signs taken with the dinamap. Pt also sitting on edge of bed and refusing to sit back in stretcher. Pt calm but not cooperative

## 2024-10-20 NOTE — ED PROVIDER NOTES
3:00 PM  Change of shift. Care of patient taken over from Dr. chris; H&P reviewed, bedside handoff complete.  Awaiting TDO.  Patient medically cleared.        7:25 PM  Family brought in medications bottles and I have updated meds.  Several medications on patient no longer appears to be taking.  Patient refusing to take any oral medications at this time.      11:02 PM  Change of shift.  Care of patient signed over to Dr. Coppola.  Bedside handoff complete. Awaiting psychiatric placment.       Dalia Gilmore MD  10/20/24 8985

## 2024-10-20 NOTE — ED NOTES
Bedside and Verbal shift change report given to TRENA RN (oncoming nurse) by Lisa RN (offgoing nurse). Report included the following information ED Encounter Summary, ED SBAR, MAR, and Recent Results.

## 2024-10-20 NOTE — BSMART NOTE
Received message from Cee w/ Jose confirming that [facility] is recommending a TDO and pt has been declined from all [facility] facilities at this time d/t capacity, CPAP machine, weight limit and recent aggression in the ED.

## 2024-10-20 NOTE — BSMART NOTE
Clinician met w/ pt face to face.  Attempted to complete BSMART assessment and C-SSRS Suicide Screening, but was unsuccessful d/t pt being highly confused, disoriented, uncooperative and not answering questions appropriately.  Pt was received pacing around his room holding his pants below his waist.  When prompted to cover himself pt reports he cannot do that because \"I told you. I'm expecting a phone call\".  Pt inquired if this clinician worked at [employer] commenting that his wife lives there commenting, \"That's why I asked you, you are working at [employer] up the street\".  Pt is oriented to self, but not date, month, year, location or situation.  Pt insisted that he is currently not in the emergency department and when asked why he came to the hospital he responds, \"highly recommended... anything that. Oh, am I in the right place\".  When asked for the current month he responds, \"08... 06\" and when asked for the year he states, \"The year? Everybody knows that. It's a given. I mean it's care. C-A-R-E. People care. God cares\".  When asked about a history of mental health diagnoses pt states, \"That's why I wanted to talk to you about. The first thing I want to do is answer your questions in order... Started in the beginning. It just didn't pop up. Came over time. Man excused me\".      Pt denies SI stating, \"No indeed. That would be crazy. That would be the end. The end\".  Pt denies HI asking, \"Why would I hurt other people? Okay? You\". Pt denies VH/AH; however, pt appears to be actively responding to internal stimuli d/t frequently pacing and looking around the room.  Per ED nursing note from earlier this morning by BLANCA Szymanski at 0230, \"While rounding, this RN walked into the pt's room to find him going through the closets and filling up a urinal with water, washing his underwear in the sink and messing with a pair of scissors from the cabinet. Pt also taking off his gown and walking around the room naked. Pt

## 2024-10-20 NOTE — ED NOTES
I was alerted to patient waiting on ride from family. Apparently he has been having bizarre behavior overnight which is outside of his norm. Will have BSMART assess for mental health issue. CT head ordered to r/o organic cause. Differential includes SDH/mass or sundowning/delirium. Labs reassuring as well as abdominal imaging.     Discussed with family who tell me he is bipolar and has not been taking his meds, has been 10+ years since he last had a manic episode. Patient with hyper-religiosity and disorientation clearly manic with psychotic features. CT head is negative. MEDICALLY CLEAR FOR TRANSFER OR PSYCHIATRIC ADMISSION.     Will need crisis evaluation for TDO proceedings as he does not appear to have capacity.     Crisis plans to place under TDO.  Patient will be pending involuntary placement.  He was agitated with staff and required Haldol for this.  PRNs placed.    3:00 PM  Change of shift.  Care of patient signed over to Dr. Gilmore.  Bedside handoff complete. Awaiting placement under TDO.       Joao Juarez MD  10/21/24 1012

## 2024-10-20 NOTE — ED NOTES
0930: Patient requesting Coffee at this time. RN provided patient with coffee    0947: Patient threw full coffee cup across room and began spitting. Patient repositioned in bed and provided with new blankets. Educated patient on importance of asking for help and not throwing objects     1015: Patient is not re-directable, patient has made 10 + attempts to get up out of bed. RN has explained to patient multiple times importance of staying in bed so he does not fall. Sitter remains at bedside.     1035: Patient continues to get out of bed. Requesting recliner, RN and sitter got patient recliner, new blankets provided and patient resting comfortably      1050: Patient stating he has to urinate. Standing up with RN and sitter attempting to use urinal. Patient began spitting and became physically and verbally aggressive towards sitter and RN at this time. Attempting to re-direct patient without success. Patient stating he has to have a bowel movement. Bedside commode provided. Patient sat down on bedside commode and immediately stood up and threw the bedside commode across the room.     1125: Patient back in bed at this time, preparing to give Haldol. See MAR for medication administration     1225: [facility] at bedside     1330: Patient stating he has to urinate. Patient has attempted to use urinal 2 other times with no success. Patient attempting to use urinal for 3rd time without luck. Patient bladder scanned and 260 ml in bladder. Patient straight cathed at this time. MD aware     1440: step daughter at bedside at this time    1505: Patient becoming verbally aggressive again. Spit food onto this RN     1637: Patient stating he needs to use the restroom. RN offered bed pan multiple times. Patient made multiple attempts with no luck    1845: Patient at end of bed. Verbally and physically aggressive. Patient spitting water on RN and tech. Patient attempting to punch RN and tech multiple times. Code atlas  called and patient medicated at this time. See MAR for further medication administration     1857: Stepdaughter back at bedside     1907: HPD at bedside to serve TDO at this time     1937: Patient's Step daughter taking patient's belongings at this time, only leaving [place]

## 2024-10-20 NOTE — ED NOTES
While rounding, this RN walked into the pt's room to find him going through the closets and filling up a urinal with water, washing his underwear in the sink and messing with a pair of scissors from the cabinet. Pt also taking off his gown and walking around the room naked. Pt oriented back to stretcher. This RN and Mitchell RN attempted to put gown back on pt. Pt refusing and ripping the gown out of our hands during attempt to put it on. Charge Mary RIOS made aware and ANTONIA called for safety sitter    0310: Safety sitter at bedside

## 2024-10-20 NOTE — ED NOTES
Pt laying down reversed in stretcher. Pt pulled off all of his monitor cords twice as well as gown. Bed sheets and gown changed after incontinent episode of urine. Pt unable to turn in bed to be right side up due to being uncooperative. Bed alarm placed on pt's stretcher and stretcher alarm turned on as well. Pt given warm blankets to provide comfort.

## 2024-10-20 NOTE — ED NOTES
Assumed care of patient at this time. Based on report received, BSMART consulted. Patient agreeable to vitals at this time. Patient currently refusing any medication. Bed alarm on, sitter no longer in place.     0815: patient family member on phone, attempted to transfer call into room, however patient refusing help with phone and hung up on family. Patient then asking RN to give patient cell phone \"behind you\". RN educated patient that there is no phone behind self and offered to look in belongings bag for cell phone. Patient adamantly refusing stating \"I don't need any more clothes, I have some on\".     0855: Per BSMART, recommending [facility] evaluation.     0905: RN rounded on patient, patient had removed all clothes and was attempting to redress self to \"go home\". Patient redirected,  changed into green gown. Patient moved to single room in ED 23. Report given to BLANCA Gonzalez.

## 2024-10-21 LAB
AMPHET UR QL SCN: NEGATIVE
ANION GAP SERPL CALC-SCNC: 7 MMOL/L (ref 2–12)
BARBITURATES UR QL SCN: NEGATIVE
BENZODIAZ UR QL: NEGATIVE
BUN SERPL-MCNC: 14 MG/DL (ref 6–20)
BUN/CREAT SERPL: 9 (ref 12–20)
CALCIUM SERPL-MCNC: 9.3 MG/DL (ref 8.5–10.1)
CANNABINOIDS UR QL SCN: NEGATIVE
CHLORIDE SERPL-SCNC: 99 MMOL/L (ref 97–108)
CO2 SERPL-SCNC: 25 MMOL/L (ref 21–32)
COCAINE UR QL SCN: NEGATIVE
COMMENT:: NORMAL
CREAT SERPL-MCNC: 1.51 MG/DL (ref 0.7–1.3)
EKG ATRIAL RATE: 116 BPM
EKG DIAGNOSIS: NORMAL
EKG P-R INTERVAL: 158 MS
EKG Q-T INTERVAL: 302 MS
EKG QRS DURATION: 86 MS
EKG QTC CALCULATION (BAZETT): 419 MS
EKG R AXIS: -44 DEGREES
EKG T AXIS: 5 DEGREES
EKG VENTRICULAR RATE: 116 BPM
ETHANOL SERPL-MCNC: <10 MG/DL (ref 0–0.08)
FLUAV RNA SPEC QL NAA+PROBE: NOT DETECTED
FLUBV RNA SPEC QL NAA+PROBE: NOT DETECTED
GLUCOSE SERPL-MCNC: 138 MG/DL (ref 65–100)
Lab: NORMAL
METHADONE UR QL: NEGATIVE
OPIATES UR QL: NEGATIVE
PCP UR QL: NEGATIVE
POTASSIUM SERPL-SCNC: 3.6 MMOL/L (ref 3.5–5.1)
SARS-COV-2 RNA RESP QL NAA+PROBE: NOT DETECTED
SODIUM SERPL-SCNC: 131 MMOL/L (ref 136–145)
SOURCE: NORMAL
SPECIMEN HOLD: NORMAL

## 2024-10-21 PROCEDURE — 87636 SARSCOV2 & INF A&B AMP PRB: CPT

## 2024-10-21 PROCEDURE — 6370000000 HC RX 637 (ALT 250 FOR IP): Performed by: EMERGENCY MEDICINE

## 2024-10-21 PROCEDURE — 6370000000 HC RX 637 (ALT 250 FOR IP): Performed by: NURSE PRACTITIONER

## 2024-10-21 PROCEDURE — 36415 COLL VENOUS BLD VENIPUNCTURE: CPT

## 2024-10-21 PROCEDURE — 93010 ELECTROCARDIOGRAM REPORT: CPT | Performed by: INTERNAL MEDICINE

## 2024-10-21 PROCEDURE — 80048 BASIC METABOLIC PNL TOTAL CA: CPT

## 2024-10-21 PROCEDURE — 6370000000 HC RX 637 (ALT 250 FOR IP): Performed by: STUDENT IN AN ORGANIZED HEALTH CARE EDUCATION/TRAINING PROGRAM

## 2024-10-21 RX ORDER — RISPERIDONE 1 MG/1
1 TABLET ORAL 2 TIMES DAILY
Status: DISCONTINUED | OUTPATIENT
Start: 2024-10-21 | End: 2024-10-25

## 2024-10-21 RX ORDER — RISPERIDONE 1 MG/1
1 TABLET ORAL 2 TIMES DAILY PRN
Status: DISCONTINUED | OUTPATIENT
Start: 2024-10-21 | End: 2024-10-25

## 2024-10-21 RX ORDER — RISPERIDONE 1 MG/1
1 TABLET ORAL ONCE
Status: COMPLETED | OUTPATIENT
Start: 2024-10-21 | End: 2024-10-21

## 2024-10-21 RX ORDER — HYDROXYZINE HYDROCHLORIDE 50 MG/1
50 TABLET, FILM COATED ORAL 4 TIMES DAILY PRN
Status: DISCONTINUED | OUTPATIENT
Start: 2024-10-21 | End: 2024-10-25

## 2024-10-21 RX ADMIN — METFORMIN HYDROCHLORIDE 500 MG: 500 TABLET ORAL at 17:04

## 2024-10-21 RX ADMIN — LORAZEPAM 2 MG: 1 TABLET ORAL at 21:44

## 2024-10-21 RX ADMIN — RISPERIDONE 1 MG: 1 TABLET, FILM COATED ORAL at 18:47

## 2024-10-21 RX ADMIN — HYDROXYZINE HYDROCHLORIDE 50 MG: 25 TABLET ORAL at 21:44

## 2024-10-21 RX ADMIN — METFORMIN HYDROCHLORIDE 500 MG: 500 TABLET ORAL at 08:17

## 2024-10-21 RX ADMIN — AMLODIPINE BESYLATE 10 MG: 5 TABLET ORAL at 08:17

## 2024-10-21 RX ADMIN — LISINOPRIL 20 MG: 20 TABLET ORAL at 08:17

## 2024-10-21 RX ADMIN — FUROSEMIDE 20 MG: 20 TABLET ORAL at 08:17

## 2024-10-21 RX ADMIN — HYDROCHLOROTHIAZIDE 12.5 MG: 25 TABLET ORAL at 08:17

## 2024-10-21 RX ADMIN — RISPERIDONE 1 MG: 1 TABLET, FILM COATED ORAL at 21:45

## 2024-10-21 ASSESSMENT — PAIN SCALES - WONG BAKER: WONGBAKER_NUMERICALRESPONSE: NO HURT

## 2024-10-21 ASSESSMENT — PAIN - FUNCTIONAL ASSESSMENT: PAIN_FUNCTIONAL_ASSESSMENT: WONG-BAKER FACES

## 2024-10-21 NOTE — ED NOTES
Patient signed out to me by Dr. Coppola at 7a  74 y.o. male pending U admit placement, TDO.    1:28 PM I personally assessed pt, no acute needs. Eating lunch.    Signed out to Dr. Bennett at 3p pending placement     Cesar George,   10/21/24 1320

## 2024-10-21 NOTE — BSMART NOTE
BSMART Liaison Team Note     LOS:  0     Patient goals for today:  take medications as prescribed, make needs known in an appropriate manner.  BSMART Liaison team focus/goals: assess MH needs, provide therapeutic support, brief therapy, and education, as needed.  Assist medical care management team with recommendations for coordination of care.    Progress note: per triage, Pt arrives via EMS visiting wife at [place], pt c/o nausea chest pain. EMS arrived and performed EKG showing abnormal rhythm, , 87 HR, 97 ra, feeling \"distended and RL abdominal pain\".  Pt is Aox4 in no apparent distress. Denies SOB or current pain.       Pt is received resting in bed with HPD and sitter outside of door.  Pt is alert, oriented to self only, thoughts are confused, tangential, and he reports his mood is \"frustrated\".  Pt does not understand why he is in the hospital, thinks he is still at [facility], reports he needs to get to work, but before he does that he needs to talk to his wife about their bills.  Pt then states he is actually looking for a job and has all the qualifications to work at [facility].  Pt reports that [facility] staff told him he was being \"ornery\" and states he doesn't remember feeling that way, and still doesn't understand why he can't leave.  Pt reports all he wants to do is talk to his wife about their bills.  He reports time seems to be getting away from him and states \"Time never settles - it never evens out\".  Liaison provided therapeutic support and comfort.  Pt thanked this writer for checking on him.  Liaison team will continue to monitor and support as needed.      Barriers to Discharge: [facility] bed search  Guns in the home:  no    Outpatient provider(s):  none reported  Insurance info/prescription coverage:  medicare    Diagnosis: hx of bipolar with psychotic features    Plan:  [facility] TDO bed search.  Please refer to most recent psychiatric consult note and medical team for recommendations

## 2024-10-21 NOTE — BSMART NOTE
Per Madan, with [facility], no current progress on TDO bed search.  Per BSMART Clinician note, from 10/20/24, pt has been declined from all [facility] facilities d/t capacity, CPAP machine, weight limit and recent aggression in the ED.

## 2024-10-21 NOTE — ED NOTES
Patients chart accessed to assist Primary RN.    Patients belongings ( 1 pair of shoes, 1 shirt, 1 pair of pants) put in ED safe Shelf 3 at this time.

## 2024-10-21 NOTE — ED NOTES
Patient provided with meal tray. Patient agitated that tray will not sit perfectly on the bed rails slide tray. Educated patient that it will not fit perfectly. Patient unable to be redirected regarding tray placement. Agitated with sitter. HPD at bedside. Patient remains in view of nurses station.

## 2024-10-21 NOTE — ED NOTES
Bedside and Verbal shift change report given to Camilla RN (oncoming nurse) by Nessa RN (offgoing nurse). Report included the following information ED SBAR, MAR, and Recent Results.

## 2024-10-21 NOTE — CONSULTS
[facility]  PSYCHIATRY CONSULT NOTE:    Name: Mani Estrella  MR#: [id number]  : 1950  ACCOUNT#: [id number]  ADMIT DATE: 10/19/2024    REASON FOR CONSULT: TDO bed hold      HISTORY OF PRESENTING COMPLAINT:  Mani Estrella is a 74 y.o. male presently at [place] emergency department as a TDO awaiting hearing.  Was admitted on 10/20, would expect hearing by 10/23.  Mr. 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  Finished or the tech.    He does endorse a history of bipolar and depression but denies ever taking anything other than citalopram which is a concern due to this being an SSRI only.    His medication records do show Abilify 15 mg and Rexulti 1 mg which I will resume.    I do have a difficult time obtaining meaningful  information due to his inability to focus and wanting to pray the duration of our meeting.     He has become agitated quickly per nursing where he throws items in the room if he does not want them close by.  When I initially entered the room he handed me a cup of milk and informed me to throw it away and would not accept my statement that I would ask the teck or nurse to help once we were finished with assessment.     Otherwise, as stated he was quite pleasant but unaware of the significance of what was occurring and presenting with a delusional mindset and grandiosity stating \"it does not matter when the  is here God will be with me it will be what it will be\".     He denies to me any history of inpatient admissions.  Denies any concerns presently to include insomnia, anxiety, thoughts of suicide, homicide, or any present audio or visual hallucinations per se.  He does however endorse \"I have a lot going on in my mind\", and is prescribed hydroxyzine as needed at home at bed time  and diltiazem  mg Er at bed time.      PAST PSYCHIATRIC HISTORY:     From med be consolidation appears to be on Abilify 15 mg, Rexulti 1 mg, diltiazem hydrochloride  mg at bedtime, hydroxyzine 25 mg at bedtime as needed, Celexa 10 mg    SUBSTANCE ABUSE HISTORY: Unknown    PSYCHOSOCIAL HISTORY: Local with wife at [place]    MENTAL STATUS EXAM:   Mani Estrella is a 74 y.o. Black /  male who appears his/her stated age. He is semi-cooperative with assessment questions. He makes fair eye contact.  No psychomotor agitation/retardation is observed. His speech is hyperverbal, interruptive,  with flight of ideas and normal in volume. His self-reported mood is \"wonderful\". His affect is currently euthymic slightly elevated. He denies auditory and visual hallucinations. No paranoia or delusions are elicited with assessment. His thought processes are currently distorted. He denies suicidal and homicidal ideation. He is alert and oriented X 4.  Insight and judgment are limited/poor.    DIAGNOSTIC IMPRESSION: Acute dillon nos, Bipolar nos, anxiety nos     ASSESSMENT/PLAN: TDO presentlyHe/she agrees to this course of treatment. Bed search initiated.     Resume home medications once confirmed or per symptoms if unable to be verified    I spoke to his pharmacist at [facility] who confirmed he has not been taking   Abilify- 15 daily  Rexalti 1 mg daily   Diltiazem hcl 300 er bed time  Hydroxyzine 25 mg as needed bed time   No other pharmacy or previous listed that I see    Due to not knowing how he reacted to any for now I will start on Risperidone 1 mg bid to help with symptoms     Will add additional dosage for as needed if he becomes increasingly agitated which is not expected      Psychiatric services will follow during ER stay on bed hold daily      Thank you for the opportunity to participate in the care of your patient. Please re-consult psychiatry as needed.

## 2024-10-21 NOTE — ED NOTES
Bedside and Verbal shift change report given to Nessa RN (oncoming nurse) by TRENA RN (offgoing nurse). Report included the following information ED SBAR, MAR, and Recent Results.

## 2024-10-22 ENCOUNTER — APPOINTMENT (OUTPATIENT)
Facility: HOSPITAL | Age: 74
End: 2024-10-22
Payer: MEDICARE

## 2024-10-22 PROBLEM — N17.9 ACUTE KIDNEY INJURY (HCC): Status: ACTIVE | Noted: 2024-10-22

## 2024-10-22 LAB
ANION GAP SERPL CALC-SCNC: 7 MMOL/L (ref 2–12)
ANION GAP SERPL CALC-SCNC: 7 MMOL/L (ref 2–12)
BUN SERPL-MCNC: 18 MG/DL (ref 6–20)
BUN SERPL-MCNC: 20 MG/DL (ref 6–20)
BUN/CREAT SERPL: 7 (ref 12–20)
BUN/CREAT SERPL: 8 (ref 12–20)
CALCIUM SERPL-MCNC: 8.5 MG/DL (ref 8.5–10.1)
CALCIUM SERPL-MCNC: 8.7 MG/DL (ref 8.5–10.1)
CHLORIDE SERPL-SCNC: 101 MMOL/L (ref 97–108)
CHLORIDE SERPL-SCNC: 102 MMOL/L (ref 97–108)
CK SERPL-CCNC: 974 U/L (ref 39–308)
CO2 SERPL-SCNC: 24 MMOL/L (ref 21–32)
CO2 SERPL-SCNC: 24 MMOL/L (ref 21–32)
COMMENT:: NORMAL
CREAT SERPL-MCNC: 2.21 MG/DL (ref 0.7–1.3)
CREAT SERPL-MCNC: 2.71 MG/DL (ref 0.7–1.3)
CREAT UR-MCNC: 87.8 MG/DL
GLUCOSE SERPL-MCNC: 108 MG/DL (ref 65–100)
GLUCOSE SERPL-MCNC: 137 MG/DL (ref 65–100)
POTASSIUM SERPL-SCNC: 3.9 MMOL/L (ref 3.5–5.1)
POTASSIUM SERPL-SCNC: 4.4 MMOL/L (ref 3.5–5.1)
SODIUM SERPL-SCNC: 132 MMOL/L (ref 136–145)
SODIUM SERPL-SCNC: 133 MMOL/L (ref 136–145)
SODIUM UR-SCNC: 86 MMOL/L
SPECIMEN HOLD: NORMAL

## 2024-10-22 PROCEDURE — 36415 COLL VENOUS BLD VENIPUNCTURE: CPT

## 2024-10-22 PROCEDURE — 2580000003 HC RX 258: Performed by: STUDENT IN AN ORGANIZED HEALTH CARE EDUCATION/TRAINING PROGRAM

## 2024-10-22 PROCEDURE — 84300 ASSAY OF URINE SODIUM: CPT

## 2024-10-22 PROCEDURE — 96360 HYDRATION IV INFUSION INIT: CPT

## 2024-10-22 PROCEDURE — 1100000000 HC RM PRIVATE

## 2024-10-22 PROCEDURE — 80048 BASIC METABOLIC PNL TOTAL CA: CPT

## 2024-10-22 PROCEDURE — 76770 US EXAM ABDO BACK WALL COMP: CPT

## 2024-10-22 PROCEDURE — 82570 ASSAY OF URINE CREATININE: CPT

## 2024-10-22 PROCEDURE — 96361 HYDRATE IV INFUSION ADD-ON: CPT

## 2024-10-22 PROCEDURE — 82550 ASSAY OF CK (CPK): CPT

## 2024-10-22 PROCEDURE — 6370000000 HC RX 637 (ALT 250 FOR IP): Performed by: NURSE PRACTITIONER

## 2024-10-22 PROCEDURE — 4A03X5D MEASUREMENT OF ARTERIAL FLOW, INTRACRANIAL, EXTERNAL APPROACH: ICD-10-PCS | Performed by: FAMILY MEDICINE

## 2024-10-22 RX ORDER — POTASSIUM CHLORIDE 750 MG/1
40 TABLET, EXTENDED RELEASE ORAL PRN
Status: DISCONTINUED | OUTPATIENT
Start: 2024-10-22 | End: 2024-10-23

## 2024-10-22 RX ORDER — SODIUM CHLORIDE 0.9 % (FLUSH) 0.9 %
5-40 SYRINGE (ML) INJECTION EVERY 12 HOURS SCHEDULED
Status: DISCONTINUED | OUTPATIENT
Start: 2024-10-22 | End: 2024-10-31 | Stop reason: HOSPADM

## 2024-10-22 RX ORDER — ONDANSETRON 4 MG/1
4 TABLET, ORALLY DISINTEGRATING ORAL EVERY 8 HOURS PRN
Status: DISCONTINUED | OUTPATIENT
Start: 2024-10-22 | End: 2024-10-31 | Stop reason: HOSPADM

## 2024-10-22 RX ORDER — POLYETHYLENE GLYCOL 3350 17 G/17G
17 POWDER, FOR SOLUTION ORAL DAILY PRN
Status: DISCONTINUED | OUTPATIENT
Start: 2024-10-22 | End: 2024-10-31 | Stop reason: HOSPADM

## 2024-10-22 RX ORDER — TRAZODONE HYDROCHLORIDE 50 MG/1
50 TABLET, FILM COATED ORAL 4 TIMES DAILY PRN
Status: DISCONTINUED | OUTPATIENT
Start: 2024-10-22 | End: 2024-10-31 | Stop reason: HOSPADM

## 2024-10-22 RX ORDER — ONDANSETRON 2 MG/ML
4 INJECTION INTRAMUSCULAR; INTRAVENOUS EVERY 6 HOURS PRN
Status: DISCONTINUED | OUTPATIENT
Start: 2024-10-22 | End: 2024-10-31 | Stop reason: HOSPADM

## 2024-10-22 RX ORDER — 0.9 % SODIUM CHLORIDE 0.9 %
1000 INTRAVENOUS SOLUTION INTRAVENOUS ONCE
Status: COMPLETED | OUTPATIENT
Start: 2024-10-22 | End: 2024-10-22

## 2024-10-22 RX ORDER — SODIUM CHLORIDE 9 MG/ML
INJECTION, SOLUTION INTRAVENOUS PRN
Status: DISCONTINUED | OUTPATIENT
Start: 2024-10-22 | End: 2024-10-31 | Stop reason: HOSPADM

## 2024-10-22 RX ORDER — ACETAMINOPHEN 650 MG/1
650 SUPPOSITORY RECTAL EVERY 6 HOURS PRN
Status: DISCONTINUED | OUTPATIENT
Start: 2024-10-22 | End: 2024-10-31 | Stop reason: HOSPADM

## 2024-10-22 RX ORDER — POTASSIUM CHLORIDE 7.45 MG/ML
10 INJECTION INTRAVENOUS PRN
Status: DISCONTINUED | OUTPATIENT
Start: 2024-10-22 | End: 2024-10-23

## 2024-10-22 RX ORDER — SODIUM CHLORIDE, SODIUM LACTATE, POTASSIUM CHLORIDE, AND CALCIUM CHLORIDE .6; .31; .03; .02 G/100ML; G/100ML; G/100ML; G/100ML
1000 INJECTION, SOLUTION INTRAVENOUS ONCE
Status: COMPLETED | OUTPATIENT
Start: 2024-10-22 | End: 2024-10-22

## 2024-10-22 RX ORDER — ACETAMINOPHEN 325 MG/1
650 TABLET ORAL EVERY 6 HOURS PRN
Status: DISCONTINUED | OUTPATIENT
Start: 2024-10-22 | End: 2024-10-31 | Stop reason: HOSPADM

## 2024-10-22 RX ORDER — MAGNESIUM SULFATE IN WATER 40 MG/ML
2000 INJECTION, SOLUTION INTRAVENOUS PRN
Status: DISCONTINUED | OUTPATIENT
Start: 2024-10-22 | End: 2024-10-23

## 2024-10-22 RX ORDER — SODIUM CHLORIDE, SODIUM LACTATE, POTASSIUM CHLORIDE, CALCIUM CHLORIDE 600; 310; 30; 20 MG/100ML; MG/100ML; MG/100ML; MG/100ML
INJECTION, SOLUTION INTRAVENOUS CONTINUOUS
Status: DISCONTINUED | OUTPATIENT
Start: 2024-10-22 | End: 2024-10-23

## 2024-10-22 RX ORDER — SODIUM CHLORIDE 0.9 % (FLUSH) 0.9 %
5-40 SYRINGE (ML) INJECTION PRN
Status: DISCONTINUED | OUTPATIENT
Start: 2024-10-22 | End: 2024-10-31 | Stop reason: HOSPADM

## 2024-10-22 RX ADMIN — SODIUM CHLORIDE 1000 ML: 9 INJECTION, SOLUTION INTRAVENOUS at 00:43

## 2024-10-22 RX ADMIN — SODIUM CHLORIDE, POTASSIUM CHLORIDE, SODIUM LACTATE AND CALCIUM CHLORIDE: 600; 310; 30; 20 INJECTION, SOLUTION INTRAVENOUS at 16:47

## 2024-10-22 RX ADMIN — RISPERIDONE 1 MG: 1 TABLET, FILM COATED ORAL at 23:24

## 2024-10-22 RX ADMIN — SODIUM CHLORIDE, POTASSIUM CHLORIDE, SODIUM LACTATE AND CALCIUM CHLORIDE: 600; 310; 30; 20 INJECTION, SOLUTION INTRAVENOUS at 21:39

## 2024-10-22 RX ADMIN — SODIUM CHLORIDE, POTASSIUM CHLORIDE, SODIUM LACTATE AND CALCIUM CHLORIDE 1000 ML: 600; 310; 30; 20 INJECTION, SOLUTION INTRAVENOUS at 08:41

## 2024-10-22 ASSESSMENT — PAIN SCALES - WONG BAKER: WONGBAKER_NUMERICALRESPONSE: NO HURT

## 2024-10-22 ASSESSMENT — PAIN SCALES - GENERAL
PAINLEVEL_OUTOF10: 0
PAINLEVEL_OUTOF10: 0

## 2024-10-22 NOTE — ED PROVIDER NOTES
ED SIGN OUT NOTE  Care assumed at [facility] 11:10 PM EDT    Patient was signed out to me by Dr. Bennett.     Patient is awaiting repeat BMP for medical clearance.    Rpt bmp show largely stable sodium of 131, still mildly low. Likely 2/2 to hypovolemia in setting of slightly more elevated creatinine compared to earlier in the day. Patient was ordered 1L of IVF for hydration due to this. His case was personally discussed with Dr. Harris, psychiatrist, and I made him aware that I felt the patient was medically stable for admission to psych facility.     /70   Pulse (!) 104   Temp 97.9 °F (36.6 °C) (Oral)   Resp 19   Ht 1.854 m (6' 1\")   Wt (!) 142.7 kg (314 lb 8 oz)   SpO2 97%   BMI 41.49 kg/m²     Labs Reviewed   COMPREHENSIVE METABOLIC PANEL - Abnormal; Notable for the following components:       Result Value    Sodium 132 (*)     Glucose 112 (*)     Creatinine 1.44 (*)     Est, Glom Filt Rate 50 (*)      (*)     All other components within normal limits   CBC WITH AUTO DIFFERENTIAL - Abnormal; Notable for the following components:    Monocytes % 14 (*)     All other components within normal limits   COVID-19 & INFLUENZA COMBO   TROPONIN   LIPASE   EXTRA TUBES HOLD   BRAIN NATRIURETIC PEPTIDE   TROPONIN   URINALYSIS WITH MICROSCOPIC   EXTRA TUBES HOLD   TSH + FREE T4 PANEL   ETHANOL   URINE DRUG SCREEN   EXTRA TUBES HOLD   BASIC METABOLIC PANEL     CT HEAD WO CONTRAST   Final Result   1. No evidence of acute intracranial abnormality.         Electronically signed by Francisco Javier Gabriel      CTA CHEST ABDOMEN PELVIS W CONTRAST   Final Result         1. No evidence for aortic aneurysm or dissection. Mild to moderate   atherosclerotic changes.   2. Small distal right renal artery aneurysm.   3. Hepatic steatosis with nonspecific 2 cm lesion in the left hepatic lobe.   4. Left thyroid nodules measuring up to 2.6 cm. Recommend follow-up with thyroid   ultrasound.   5. Intermediate density right

## 2024-10-22 NOTE — ED NOTES
Bedside and Verbal shift change report given to BLANCA Fitzgerald (oncoming nurse) by BLANCA Sampson (offgoing nurse). Report included the following information Nurse Handoff Report and Index.

## 2024-10-22 NOTE — CONSULTS
Hospitalist Consult Note    Date of Service:  10/22/2024  Primary Care Provider: Dionne Mcrae APRN - NP  Source of information: Patient, Chart Review    Chief Complaint: Chest Pain and Abdominal Pain      History of Presenting Illness:   Mani Estrella is a 74 y.o. male with a PMHx of HTN, HLD, T2DM, KELSEY, and CVA.     He initially presented to the ED on 10/19/2024 with complaints of chest pain. His workup at that time was unremarkable and he was to be discharged home however be began to act erratically, family confirmed he has a history of Bipolar disorder for which he has not been taking his medications. He was evaluated by [facility] and ultimately a TDO was obtained. While awaiting Psyhiatric admission, the patient's labs demonstrated acute renal insufficiency. Hospitalist was consulted for workup of this.     At bedside this morning the patient is somnolent and only minimally responsive to painful stimuli. RN reports the patient has just received a dose of IV ativan. Due to this history is primarily obtained via chart review.      REVIEW OF SYSTEMS:  A comprehensive review of systems was negative except for that written in the History of Present Illness.     Past Medical History:   Diagnosis Date    Hypertension     Ill-defined condition     chronic lower back pain    Stroke (HCC)     april 2015      Past Surgical History:   Procedure Laterality Date    COLONOSCOPY,JENNIFER ARMENDARIZ,SNARE  4/9/2015         EXTRAC ERUPTED TOOTH/EXPOSED ROOT      TRABECULECTOMY Right     UPPER GI ENDOSCOPY,BIOPSY  4/9/2015          Prior to Admission medications    Medication Sig Start Date End Date Taking? Authorizing Provider   dilTIAZem HCl  MG TB24 Take 1 tablet by mouth at bedtime   Yes Christiano Maria MD   glipiZIDE (GLUCOTROL) 5 MG tablet Take 1 tablet by mouth daily   Yes Christiano Maria MD   hydrOXYzine HCl (ATARAX) 25 MG tablet Take 1 tablet by mouth at bedtime   Yes Christiano Maria MD  reviewed and interpreted patient's lab and all other diagnostic data    Abnormal Labs Reviewed   COMPREHENSIVE METABOLIC PANEL - Abnormal; Notable for the following components:       Result Value    Sodium 132 (*)     Glucose 112 (*)     Creatinine 1.44 (*)     Est, Glom Filt Rate 50 (*)      (*)     All other components within normal limits   CBC WITH AUTO DIFFERENTIAL - Abnormal; Notable for the following components:    Monocytes % 14 (*)     All other components within normal limits   BASIC METABOLIC PANEL - Abnormal; Notable for the following components:    Sodium 131 (*)     Glucose 138 (*)     Creatinine 1.51 (*)     BUN/Creatinine Ratio 9 (*)     Est, Glom Filt Rate 48 (*)     All other components within normal limits   BASIC METABOLIC PANEL - Abnormal; Notable for the following components:    Sodium 133 (*)     Glucose 137 (*)     Creatinine 2.21 (*)     BUN/Creatinine Ratio 8 (*)     Est, Glom Filt Rate 30 (*)     All other components within normal limits       [unfilled]    IMAGING:   CT HEAD WO CONTRAST   Final Result   1. No evidence of acute intracranial abnormality.         Electronically signed by Francisco Javier Gabriel      CTA CHEST ABDOMEN PELVIS W CONTRAST   Final Result         1. No evidence for aortic aneurysm or dissection. Mild to moderate   atherosclerotic changes.   2. Small distal right renal artery aneurysm.   3. Hepatic steatosis with nonspecific 2 cm lesion in the left hepatic lobe.   4. Left thyroid nodules measuring up to 2.6 cm. Recommend follow-up with thyroid   ultrasound.   5. Intermediate density right interpolar renal lesion measuring nearly 5 cm.   Recommend nonemergent renal ultrasound.   6. Refer to above findings for complete details.         Electronically signed by Mario Duff      XR CHEST PORTABLE   Final Result      Mild edema pattern.      Electronically signed by CAROLINA ROMEO      US RETROPERITONEAL COMPLETE    (Results Pending)        ECG/ECHO:

## 2024-10-22 NOTE — BSMART NOTE
Update: Pt did not transfer to [facility] due to medical decompensation - patient's labs demonstrated acute renal insufficiency.  According to the chart, pt may require medical admission to monitor creatinine.       08:45: Per Jean, with [facility], transport is on it's way to  the pt and transport him to [facility], this morning.  BLANCA Cortez called report to Salazar RIOS at [facility] Admission @ 05:30.  Clothing given to TDO officer for transport.

## 2024-10-22 NOTE — ED NOTES
I assumed care of this patient from Dr. Dash this morning at 7a  74 y.o. male hold for behavioral health admit  Had AM labs showing worsening renal function, creat up to 2.2 now.   I consulted medicine, gave 1L LR, and held his diuretic/hctz.    I spoke with Alisa Hastings at [facility] and they feel he should stay here until further stabilized. They will hold his bed and expect him tomorrow AM assuming no further decompensation.     4:08 PM worsening renal function, CK elevated 900's. Re-discussed with medicine team (David Milligram) who will admit. I have started mIVF.    Cesar George, Cesar Eldridge DO  10/22/24 0479

## 2024-10-22 NOTE — BSMART NOTE
Bsmart progress note:    Pt asleep with HPD at bedside during time of round. Plan is to continue bedsearch through crisis. No concerns reported during this time

## 2024-10-22 NOTE — ED NOTES
2000 Patient assessed at this time, helped to restroom to bath himself and use restroom. Patient upset due to feeling rushed.  2100 Family in room, given food at this time. Patient also asking for night meds to sleep given to sleep.  2200 Patient finally sleep at this time. Family left at this time tool patient's wedding band with her to wife.   0000 Patient sleeping, no signs of distress  0200 Patient still sleeping. Condom cath placed on patient.   0330 Patient sleeping attempt made at get pt up to leave. Patient was not able to stay awake at this time. [facility] called and updated.   0530 Attempt made to wake patient unsuccessful. Report called to Salazar RIOS  at [facility]. #[phone] Clothing given to TDO officer for transport.   0645 Shift change for TDO officers, attempt to wake patient unsuccessful

## 2024-10-22 NOTE — H&P
Hospitalist Consult Note    Date of Service:  10/22/2024  Primary Care Provider: Dionne Mcrae APRN - NP  Source of information: Patient, Chart Review    Chief Complaint: Chest Pain and Abdominal Pain      History of Presenting Illness:   Mani Estrella is a 74 y.o. male with a PMHx of HTN, HLD, T2DM, KELSEY, and CVA.     He initially presented to the ED on 10/19/2024 with complaints of chest pain. His workup at that time was unremarkable and he was to be discharged home however be began to act erratically, family confirmed he has a history of Bipolar disorder for which he has not been taking his medications. He was evaluated by [facility] and ultimately a TDO was obtained. While awaiting Psyhiatric admission, the patient's labs demonstrated acute renal insufficiency. Hospitalist was consulted for workup of this.     At bedside this morning the patient is somnolent and only minimally responsive to painful stimuli. RN reports the patient has just received a dose of IV ativan. Due to this history is primarily obtained via chart review.      REVIEW OF SYSTEMS:  A comprehensive review of systems was negative except for that written in the History of Present Illness.     Past Medical History:   Diagnosis Date    Hypertension     Ill-defined condition     chronic lower back pain    Stroke (HCC)     april 2015      Past Surgical History:   Procedure Laterality Date    COLONOSCOPY,JENNIFER ARMENDARIZ,SNARE  4/9/2015         EXTRAC ERUPTED TOOTH/EXPOSED ROOT      TRABECULECTOMY Right     UPPER GI ENDOSCOPY,BIOPSY  4/9/2015          Prior to Admission medications    Medication Sig Start Date End Date Taking? Authorizing Provider   dilTIAZem HCl  MG TB24 Take 1 tablet by mouth at bedtime   Yes Christiano Maria MD   glipiZIDE (GLUCOTROL) 5 MG tablet Take 1 tablet by mouth daily   Yes Christiano Maria MD   hydrOXYzine HCl (ATARAX) 25 MG tablet Take 1 tablet by mouth at bedtime   Yes Christiano Maria MD

## 2024-10-23 LAB
ANION GAP SERPL CALC-SCNC: 8 MMOL/L (ref 2–12)
BASOPHILS # BLD: 0 K/UL (ref 0–0.1)
BASOPHILS NFR BLD: 1 % (ref 0–1)
BUN SERPL-MCNC: 20 MG/DL (ref 6–20)
BUN/CREAT SERPL: 13 (ref 12–20)
CALCIUM SERPL-MCNC: 9.1 MG/DL (ref 8.5–10.1)
CHLORIDE SERPL-SCNC: 102 MMOL/L (ref 97–108)
CO2 SERPL-SCNC: 25 MMOL/L (ref 21–32)
CREAT SERPL-MCNC: 1.6 MG/DL (ref 0.7–1.3)
DIFFERENTIAL METHOD BLD: ABNORMAL
EOSINOPHIL # BLD: 0.1 K/UL (ref 0–0.4)
EOSINOPHIL NFR BLD: 1 % (ref 0–7)
ERYTHROCYTE [DISTWIDTH] IN BLOOD BY AUTOMATED COUNT: 13.2 % (ref 11.5–14.5)
GLUCOSE SERPL-MCNC: 121 MG/DL (ref 65–100)
HCT VFR BLD AUTO: 41 % (ref 36.6–50.3)
HGB BLD-MCNC: 13.3 G/DL (ref 12.1–17)
IMM GRANULOCYTES # BLD AUTO: 0 K/UL (ref 0–0.04)
IMM GRANULOCYTES NFR BLD AUTO: 1 % (ref 0–0.5)
LYMPHOCYTES # BLD: 1.4 K/UL (ref 0.8–3.5)
LYMPHOCYTES NFR BLD: 16 % (ref 12–49)
MAGNESIUM SERPL-MCNC: 2.1 MG/DL (ref 1.6–2.4)
MCH RBC QN AUTO: 29 PG (ref 26–34)
MCHC RBC AUTO-ENTMCNC: 32.4 G/DL (ref 30–36.5)
MCV RBC AUTO: 89.5 FL (ref 80–99)
MONOCYTES # BLD: 1 K/UL (ref 0–1)
MONOCYTES NFR BLD: 11 % (ref 5–13)
NEUTS SEG # BLD: 6.2 K/UL (ref 1.8–8)
NEUTS SEG NFR BLD: 71 % (ref 32–75)
NRBC # BLD: 0 K/UL (ref 0–0.01)
NRBC BLD-RTO: 0 PER 100 WBC
PHOSPHATE SERPL-MCNC: 3 MG/DL (ref 2.6–4.7)
PLATELET # BLD AUTO: 138 K/UL (ref 150–400)
PMV BLD AUTO: 11.4 FL (ref 8.9–12.9)
POTASSIUM SERPL-SCNC: 4.1 MMOL/L (ref 3.5–5.1)
RBC # BLD AUTO: 4.58 M/UL (ref 4.1–5.7)
SODIUM SERPL-SCNC: 135 MMOL/L (ref 136–145)
UUN UR-MCNC: 356 MG/DL
WBC # BLD AUTO: 8.8 K/UL (ref 4.1–11.1)

## 2024-10-23 PROCEDURE — 2580000003 HC RX 258: Performed by: STUDENT IN AN ORGANIZED HEALTH CARE EDUCATION/TRAINING PROGRAM

## 2024-10-23 PROCEDURE — 97530 THERAPEUTIC ACTIVITIES: CPT

## 2024-10-23 PROCEDURE — 83735 ASSAY OF MAGNESIUM: CPT

## 2024-10-23 PROCEDURE — 6370000000 HC RX 637 (ALT 250 FOR IP): Performed by: NURSE PRACTITIONER

## 2024-10-23 PROCEDURE — 2580000003 HC RX 258: Performed by: PHYSICIAN ASSISTANT

## 2024-10-23 PROCEDURE — 1100000000 HC RM PRIVATE

## 2024-10-23 PROCEDURE — 85025 COMPLETE CBC W/AUTO DIFF WBC: CPT

## 2024-10-23 PROCEDURE — 6370000000 HC RX 637 (ALT 250 FOR IP): Performed by: PHYSICIAN ASSISTANT

## 2024-10-23 PROCEDURE — 80048 BASIC METABOLIC PNL TOTAL CA: CPT

## 2024-10-23 PROCEDURE — 6370000000 HC RX 637 (ALT 250 FOR IP): Performed by: STUDENT IN AN ORGANIZED HEALTH CARE EDUCATION/TRAINING PROGRAM

## 2024-10-23 PROCEDURE — 84100 ASSAY OF PHOSPHORUS: CPT

## 2024-10-23 PROCEDURE — 84540 ASSAY OF URINE/UREA-N: CPT

## 2024-10-23 PROCEDURE — 97166 OT EVAL MOD COMPLEX 45 MIN: CPT

## 2024-10-23 PROCEDURE — 97161 PT EVAL LOW COMPLEX 20 MIN: CPT | Performed by: PHYSICAL THERAPIST

## 2024-10-23 PROCEDURE — 97530 THERAPEUTIC ACTIVITIES: CPT | Performed by: PHYSICAL THERAPIST

## 2024-10-23 RX ADMIN — SODIUM CHLORIDE, POTASSIUM CHLORIDE, SODIUM LACTATE AND CALCIUM CHLORIDE: 600; 310; 30; 20 INJECTION, SOLUTION INTRAVENOUS at 03:34

## 2024-10-23 RX ADMIN — RISPERIDONE 1 MG: 1 TABLET, FILM COATED ORAL at 21:19

## 2024-10-23 RX ADMIN — AMLODIPINE BESYLATE 10 MG: 5 TABLET ORAL at 09:54

## 2024-10-23 RX ADMIN — RISPERIDONE 1 MG: 1 TABLET, FILM COATED ORAL at 09:54

## 2024-10-23 RX ADMIN — SODIUM CHLORIDE, PRESERVATIVE FREE 10 ML: 5 INJECTION INTRAVENOUS at 21:19

## 2024-10-23 RX ADMIN — SODIUM CHLORIDE, PRESERVATIVE FREE 10 ML: 5 INJECTION INTRAVENOUS at 09:55

## 2024-10-23 RX ADMIN — ACETAMINOPHEN 650 MG: 325 TABLET ORAL at 15:11

## 2024-10-23 RX ADMIN — LISINOPRIL 20 MG: 20 TABLET ORAL at 09:54

## 2024-10-23 ASSESSMENT — PAIN DESCRIPTION - LOCATION: LOCATION: BACK

## 2024-10-23 ASSESSMENT — PAIN SCALES - GENERAL: PAINLEVEL_OUTOF10: 9

## 2024-10-23 ASSESSMENT — PAIN DESCRIPTION - DESCRIPTORS: DESCRIPTORS: ACHING;SORE

## 2024-10-23 NOTE — BSMART NOTE
Per Madan, with [facility], pt was dismissed from his TDO hearing, this morning.  Pt's FITO, Enedina, updated.

## 2024-10-23 NOTE — PROGRESS NOTES
A Spiritual Care Partner Volunteer visited Mani Estrella at [facility] in [facility] [address] on 10/23/2024     Documented by: Chaplain Simi Menjivar

## 2024-10-23 NOTE — BSMART NOTE
[facility] Liaison Team Note     LOS:  1     Patient goals for today:  take medications as prescribed, make needs known in an appropriate manner.  [facility] Liaison team focus/goals: assess MH needs, provide therapeutic support, brief therapy, and education, as needed.  Assist medical care management team with recommendations for coordination of care.    Progress note: Pt dismissed at TDO hearing today.    Per triage, Pt arrives via EMS visiting wife at [facility], pt c/o nausea chest pain. EMS arrived and performed EKG showing abnormal rhythm, , 87 HR, 97 ra, feeling \"distended and RL abdominal pain\".  Pt is Aox4 in no apparent distress. Denies SOB or current pain.        Liaison met with pt, FTF, on the medical floor with sitter at bedside.  He is alert but somnolent, oriented x3 - thought he was at [facility] -  thoughts are more linear with decreased confusion and tangentiality.  Pt's eyes remain closed during the conversation, pt is calm, and cooperative.  He reports his mood is \"good\" and states he is ready to discharge to run some errands.  Liaison explained pt needs to wait to speak with his doctor and he is agreeable.  Pt denies SI/HI/AVH and states he is not depressed or anxious.  Pt reports having no questions or concerns, at the moment.  Liaison updated pt's Enedina PAYTON.  Liaison team will continue to monitor and support as needed.       Barriers to Discharge: medical clearance  Guns in the home:  no     Outpatient provider(s):  none reported  Insurance info/prescription coverage:  medicare     Diagnosis: hx of bipolar with psychotic features     Plan:  Please refer to most recent psychiatric consult note and medical team for recommendations and disposition.      Follow up Psych Consult placed? Yes .   Psychiatrist updated? No      Participating treatment team members: Natalie Lucio LCSW Beth Dabney, LCSW  [facility] Liaison  Available on [employer]

## 2024-10-23 NOTE — CONSULTS
[facility]  PSYCHIATRY CONSULT NOTE:    Name: Mani Estrella  MR#: [id number]  : 1950  ACCOUNT#: [id number]  ADMIT DATE: 10/19/2024    REASON FOR CONSULT: TDO bed hold    Interval update:  10/23/2024  Mr. Estrella is much improved in his presentation today.  Working with physical therapy when I went into his room.  He got up to sit on the edge of his bed and was initially groggy but was arousable and became more alert and attentive.  He did remember me from the emergency department as well and said a prayer with me before I left.  He is a .  He reports his mind is much more \"clear and not racing and feels better\".  Denies any thoughts of suicide, homicide, having any audio or visual hallucinations and as noted denies any further racing thoughts.  Reports he slept better and his appetite is good and denies anxiety or other concerning symptoms today.      10/22/2024  Mani resting today when seen. Only slight mumble but otherwise remained asleep during visit. Nursing reports he had done well over night after ordered risperidone given and slept well.  This morning he became more restless on waking and was not given  morning risperidone dose,  but given ativan which he has been somnolent ever since. He may have been more agitated not willing to take risperidone, not known at this time. However, he has done well once taken.     Please refrain from administering ativan at all costs as this will keep him longer in ER        HISTORY OF PRESENTING COMPLAINT:  Mani Estrella is a 74 y.o. male presently at [facility] emergency department as a TDO awaiting hearing.  Was admitted on 10/20, would expect hearing by 10/23.  Mr. 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  Finished or the tech.    He does endorse a

## 2024-10-23 NOTE — PROGRESS NOTES
[facility]                                                                                          Hospitalist Progress Note  MU Sellers NP  Office Phone: [phone]        Date of Service:  10/23/2024  NAME:  Mani Estrella  :  1950  MRN:  [id number]       Admission Summary:   Mani Estrella is a 74 y.o. male with a PMHx of HTN, HLD, T2DM, KELSEY, and CVA.      He initially presented to the ED on 10/19/2024 with complaints of chest pain. His workup at that time was unremarkable and he was to be discharged home however be began to act erratically, family confirmed he has a history of Bipolar disorder for which he has not been taking his medications. He was evaluated by [facility] and ultimately a TDO was obtained. While awaiting Psyhiatric admission, the patient's labs demonstrated acute renal insufficiency. Hospitalist was consulted for workup of this.      At bedside this morning the patient is somnolent and only minimally responsive to painful stimuli. RN reports the patient has just received a dose of IV ativan. Due to this history is primarily obtained via chart review.        Interval history / Subjective:      Sitter at bedside, released from TDO  Psych re consulted  Per sitter patient weak when trying to ambulate to bathroom  Pt/ot consulted  Denies A/V hallucinations  Denies SI/HI  Patient is awake alert and oriented to self, location, and birth year, off to current year, able to state month and current president.  Patient states he took a \"break\" from his medications, unable to quantify time.  \"It was good to take a break.\"    Creatinine decreased to 1.60 from 2.71, could have some degree of CKD with history of hypertension and noncompliance with medications.  Patient is unable to recall any history of kidney disease.  Patient states PCP is Dr. Alba.     Assessment & Plan:     Acute renal insufficiency - improved  Hyponatremia - improved  - No  file   Depression: Not on file   Housing Stability: Not on file   Interpersonal Safety: Not on file   Utilities: Not on file       Review of Systems:   Pertinent items are noted in HPI.       Vital Signs:    Last 24hrs VS reviewed since prior progress note. Most recent are:  Vitals:    10/23/24 0954   BP: (!) 159/86   Pulse:    Resp:    Temp:    SpO2:          Intake/Output Summary (Last 24 hours) at 10/23/2024 1118  Last data filed at 10/23/2024 1036  Gross per 24 hour   Intake 2190.97 ml   Output 1875 ml   Net 315.97 ml        Physical Examination:     I had a face to face encounter with this patient and independently examined them on 10/23/2024 as outlined below:       General: no acute distress,   HEENT: PEERL, NC/AT  Neck: Supple, no JVD  Chest: Clear to auscultation bilaterally, RA   CVS: RRR, S1 S2 heard, no murmurs/rubs/gallops  Abd: Soft, non-tender, non-distended, +bowel sounds   : Murphy in place  Ext: No clubbing, no cyanosis, no edema  Neuro: AA&ox2, follows commands, WHITNEY spontaneously  Pulses: 2+, symmetric in all extremities  Skin: Warm, dry, without rashes or lesions    Data Review:    Review and/or order of clinical lab test  Review and/or order of tests in the radiology section of CPT  Review and/or order of tests in the medicine section of CPT      I have personally and independently reviewed all pertinent labs, diagnostic studies, imaging, and have provided independent interpretation of the same.     Labs:     Recent Labs     10/23/24  0542   WBC 8.8   HGB 13.3   HCT 41.0   *     Recent Labs     10/22/24  0513 10/22/24  1102 10/23/24  0542   * 132* 135*   K 3.9 4.4 4.1    101 102   CO2 24 24 25   BUN 18 20 20   MG  --   --  2.1   PHOS  --   --  3.0     No results for input(s): \"ALT\", \"TP\", \"GLOB\", \"GGT\" in the last 72 hours.    Invalid input(s): \"SGOT\", \"GPT\", \"AP\", \"TBIL\", \"TBILI\", \"ALB\", \"AML\", \"AMYP\", \"LPSE\", \"HLPSE\"  No results for input(s): \"INR\", \"APTT\" in the last 72

## 2024-10-23 NOTE — ED NOTES
ED TO INPATIENT SBAR HANDOFF    Patient Name: Mani Estrella   :  1950  74 y.o.   MRN:  [id number]  ED Room #:  ER23/23     Situation  Code Status: Full Code   Allergies: Patient has no known allergies.  Weight: Patient Vitals for the past 96 hrs (Last 3 readings):   Weight   10/20/24 0731 (!) 142.7 kg (314 lb 8 oz)       Arrived from: home    Chief Complaint:   Chief Complaint   Patient presents with    Chest Pain    Abdominal Pain       Hospital Problem/Diagnosis:  Principal Problem:    Acute kidney injury (HCC)  Resolved Problems:    * No resolved hospital problems. *      Mobility: no current mobility problem   ED Fall Risk: Presents to emergency department  because of falls (Syncope, seizure, or loss of consciousness): No, Age > 70: Yes, Altered Mental Status, Intoxication with alcohol or substance confusion (Disorientation, impaired judgment, poor safety awaremess, or inability to follow instructions): Yes, Impaired Mobility: Ambulates or transfers with assistive devices or assistance; Unable to ambulate or transer.: No, Nursing Judgement: Yes   Fell in ED or prior to admission: n/a   Restraints: no - PD at bedside    Sitter: yes   Family/Caregiver Present: no    Neet to know social/safety information: n/a    Background  History:   Past Medical History:   Diagnosis Date    Hypertension     Ill-defined condition     chronic lower back pain    Stroke (HCC)     2015       Assessment    Abnormal Assessment Findings: Aox1 (self)  Imaging:   US RETROPERITONEAL COMPLETE   Final Result   Large right renal cysts. No hydronephrosis.      Electronically signed by HUMBERTO HILL      CT HEAD WO CONTRAST   Final Result   1. No evidence of acute intracranial abnormality.         Electronically signed by Francisco Javier Gabriel      CTA CHEST ABDOMEN PELVIS W CONTRAST   Final Result         1. No evidence for aortic aneurysm or dissection. Mild to moderate   atherosclerotic changes.   2. Small distal right renal artery      Sepsis: Sepsis Risk Score   Predictive Model Details          21 (Low)  Factor Value    Calculated 10/22/2024 23:34 12% Total Active Inpatient Meds 26    [facility] EARLY DETECTION OF SEPSIS VERSION 2 Model 8% O2 Delivery Method ROOM AIR     8% Creatinine 2.71 MG/DL     6% Simon Coma Scale 14     6% Age 74 years old     4% Change in Creatinine 2.21 MG/DL -> 2.71 MG/DL     -3% Absolute Lymphocytes 2.0 K/UL     3%  U/L     3% Antiemetic Admins Last 24 Hours 0     3% Has Imaging Procedure present      No results for input(s): \"WBC\", \"LACTACIDWB\" in the last 72 hours.  Blood Cultures Drawn: No  n/a am  Repeat LA: Time Due n/a  Antibiotic Given: No  Fluid Resuscitation: Total needed n/a, Status other n/a    VS x 2 post-fluid resuscitation: N/A    Recommendation    Plan for next 24 hours: Virtual court case in morning  Additional Comments: AVOID ATIVAN AT ALL COSTS per Adriana Hinson, APRN-CNP, Psychiatry  PD at bedside - TDO     Pending orders Morning labs. UA  Consults ordered: IP CONSULT TO CASE MANAGEMENT  IP CONSULT TO BSMART  IP CONSULT TO PSYCHIATRY  IP CONSULT TO INTERNAL MEDICINE  IP CONSULT TO PSYCHIATRY  IP CONSULT TO NEPHROLOGY    Consulted provider:      If any further questions, please call Sending RN at [phone]  Electronically signed by: Electronically signed by Nguyễn Contreras RN on 10/22/2024 at 11:46 PM

## 2024-10-23 NOTE — PLAN OF CARE
implement solutions;Decreased awareness of errors;Assistance required to identify errors made;Assistance required to generate solutions;Assistance required to correct errors made  Insights: Not aware of deficits  Initiation: Requires cues for all  Sequencing: Requires cues for all  Cognition Comment: lethargic requiring max cues for reorientation and attention to task, fair carryover    Skin: Appears intact    Edema: None noted    Hearing:   Hearing  Hearing: Within functional limits    Vision/Perceptual:          Vision  Vision: Impaired  Vision Exceptions: Wears glasses for reading  Tracking: Unable to test secondary to decreased visual attention       Range of Motion:   AROM: Generally decreased, functional  PROM: Generally decreased, functional      Strength:  Strength: Generally decreased, functional      Coordination:  Coordination: Generally decreased, functional            Tone & Sensation:                 Functional Mobility and Transfers for ADLs:    Bed Mobility:     Bed Mobility Training  Bed Mobility Training: Yes  Overall Level of Assistance: Maximum assistance;Total assistance;Assist X2  Interventions: Demonstration;Manual cues;Tactile cues;Verbal cues;Visual cues;Safety awareness training;Weight shifting training/pressure relief  Rolling: Maximum assistance;Total assistance;Assist X2  Supine to Sit: Maximum assistance;Total assistance;Assist X2  Sit to Supine: Total assistance;Assist X2  Scooting: Total assistance;Assist X2    Transfers:      Transfer Training  Transfer Training: No (unable to stand despite multiple attemps)                     Balance:      Balance  Sitting: Impaired  Sitting - Static: Poor (constant support);Fair (occasional)  Sitting - Dynamic: Poor (constant support)  Standing:  (unable to assess)      ADL Assessment:          Feeding: Moderate assistance;Based on clinical judgement       Grooming: Maximum assistance;Based on clinical judgement       UE Bathing: Maximum

## 2024-10-23 NOTE — CONSULTS
[facility]  PSYCHIATRY CONSULT NOTE:    Name: Mani Estrella  MR#: [id number]  : 1950  ACCOUNT#: [id number]  ADMIT DATE: 10/19/2024    REASON FOR CONSULT: TDO bed hold    Interval update:  10/22/2024  Mani resting today when seen. Only slight mumble but otherwise remained asleep during visit. Nursing reports he had done well over night after ordered risperidone given and slept well.  This morning he became more restless on waking and was not given  morning risperidone dose,  but given ativan which he has been somnolent ever since. He may have been more agitated not willing to take risperidone, not known at this time. However, he has done well once taken.     Please refrain from administering ativan at all costs as this will keep him longer in ER        HISTORY OF PRESENTING COMPLAINT:  Main Estrella is a 74 y.o. male presently at [place] emergency department as a TDO awaiting hearing.  Was admitted on 10/20, would expect hearing by 10/23.  Mr. 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  Finished or the tech.    He does endorse a history of bipolar and depression but denies ever taking anything other than citalopram which is a concern due to this being an SSRI only.    His medication records do show Abilify 15 mg and Rexulti 1 mg which I will resume.    I do have a difficult time obtaining meaningful  information due to his inability to focus and wanting to pray the duration of our meeting.     He has become agitated quickly per nursing where he throws items in the room if he does not want them close by.  When I initially entered the room he handed me a cup of milk and informed me to throw it away and would not accept my statement that I would ask the teck or nurse to help once we were finished with  assessment.     Otherwise, as stated he was quite pleasant but unaware of the significance of what was occurring and presenting with a delusional mindset and grandiosity stating \"it does not matter when the  is here God will be with me it will be what it will be\".     He denies to me any history of inpatient admissions.  Denies any concerns presently to include insomnia, anxiety, thoughts of suicide, homicide, or any present audio or visual hallucinations per se.  He does however endorse \"I have a lot going on in my mind\", and is prescribed hydroxyzine as needed at home at bed time and diltiazem  mg Er at bed time.      PAST PSYCHIATRIC HISTORY:     From med be consolidation appears to be on Abilify 15 mg, Rexulti 1 mg, diltiazem hydrochloride  mg at bedtime, hydroxyzine 25 mg at bedtime as needed, Celexa 10 mg    SUBSTANCE ABUSE HISTORY: Unknown    PSYCHOSOCIAL HISTORY: Local with wife at [facility]    MENTAL STATUS EXAM:   Mani Estrella is a 74 y.o. Black /  male who appears his/her stated age. He is semi-cooperative with assessment questions. He makes fair eye contact.  No psychomotor agitation/retardation is observed. His speech is hyperverbal, interruptive,  with flight of ideas and normal in volume. His self-reported mood is \"wonderful\". His affect is currently euthymic slightly elevated. He denies auditory and visual hallucinations. No paranoia or delusions are elicited with assessment. His thought processes are currently distorted. He denies suicidal and homicidal ideation. He is alert and oriented X 4.  Insight and judgment are limited/poor.    DIAGNOSTIC IMPRESSION: Acute dillon nos, Bipolar nos, anxiety nos     ASSESSMENT/PLAN:     Updated 10/22/2024    Continue Risperidone 1 mg bid    AVOID ATIVAN AT ALL COSTS WHICH WILL CAUSE HIM TO REMAIN IN ED LONGER    USE OTHER PRNS FOR ANXIETY I WILL ORDER AS FIRST CHOICE IF POSSIBLE    TDO presentlyHe/she agrees to this course of

## 2024-10-23 NOTE — CARE COORDINATION
JIM:    CM spoke with [facility] and Psych regarding TDO (TDO now dismissed) and current status. Pt is doing better but remains groggy. CM will conduct admission on 10/24    PT and OT orders in place to evaluate mobility    Pt lives with wife; is a ; wife currently at [facility].     CM will follow for CM needs and pending admission.

## 2024-10-23 NOTE — CONSULTS
NEPHROLOGY CONSULT NOTE     Patient: Mani Estrella MRN: [id number]  PCP: Dionne Mcrae APRN - NP   :     1950  Age:   74 y.o.  Sex:  male      Referring physician: Angelina Roberts MD  Reason for consultation: 74 y.o. male with ANNAMARIA (acute kidney injury) (HCC) [N17.9]  Acute kidney injury (HCC) [N17.9]  Bipolar affective disorder, currently manic, severe, with psychotic features (HCC) [F31.2]  Non-traumatic rhabdomyolysis [M62.82]  Chest pain, unspecified type [R07.9] complicated by ANNAMARIA   Admission Date: 10/19/2024  8:05 AM  LOS: 1 day      ASSESSMENT and PLAN :   ANNAMARIA on ? CKD:  - suspect contrast in the setting of thiazide/loop diuretics + ACEi and metformin  - no obstructive issues on U/S, cysts noted on R kidney  - Cr better  - ok to cont ACEi  - hold diuretics and metformin  - d/c IVF  - ok for psych admission from nephrology standpoint    Mild hyponatremia:  - suspect 2/2 thiazide use  - mild, cont to monitor    HTN  DM2  Obesity  KELSEY  Bipolar d/o     Active Problems / Assessment AAActive  :   Principal Problem:    Acute kidney injury (HCC)  Resolved Problems:    * No resolved hospital problems. *       Subjective:   HPI: Mani Estrella is a 74 y.o.  male who has been admitted to the hospital for chest pain on 10/19.  He has a hx of Dm2, HTN, obseity, KELSEY, bipolar d/o.  Underwent CTA that was neg for PE or dissection.  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  US on 10/22 confirmed cysts on R kidney.  Unclear baseline, Cr was 1.4 on admission, atif to 2.7 yesterday and down to 1.6 today. Pt is a poor hx, unable to provide much hx. TDO and awaiting psych admission after medical clearance.  He was on lasix, HCTZ, lisnopril, metformin at home.  No cp, sob, n/v/d reported at this time.    Past Medical Hx:   Past Medical History:   Diagnosis Date    Hypertension     Ill-defined condition     chronic lower back pain    Stroke

## 2024-10-23 NOTE — PLAN OF CARE
Problem: Physical Therapy - Adult  Goal: By Discharge: Performs mobility at highest level of function for planned discharge setting.  See evaluation for individualized goals.  Description: FUNCTIONAL STATUS PRIOR TO ADMISSION: Patient was independent and active without use of DME.    HOME SUPPORT PRIOR TO ADMISSION: The patient lived with wife.  Wife has been in SNF for rehab for the past month.    Physical Therapy Goals  Initiated 10/23/2024  1.  Patient will move from supine to sit and sit to supine in bed with minimal assistance within 7 day(s).    2.  Patient will perform sit to stand with maximal assistance within 7 day(s).  3.  Patient will transfer from bed to chair and chair to bed with maximal assistance using the least restrictive device within 7 day(s).  4.  Patient will ambulate approx 5 feet with modA x 2 within 7 days.  Outcome: Progressing   PHYSICAL THERAPY EVALUATION    Patient: Mani Estrella (74 y.o. male)  Date: 10/23/2024  Primary Diagnosis: ANNAMARIA (acute kidney injury) ([facility]) [N17.9]  Acute kidney injury (HCC) [N17.9]  Bipolar affective disorder, currently manic, severe, with psychotic features ([facility]) [F31.2]  Non-traumatic rhabdomyolysis [M62.82]  Chest pain, unspecified type [R07.9]       Precautions: Restrictions/Precautions: Fall Risk                      ASSESSMENT :   DEFICITS/IMPAIRMENTS:   The patient is limited by impaired balance, confusion, gait instability, generalized weakness and decreased activity tolerance.  Patient is oriented to self only during session and is rather lethargic.  Overall needs maxA x 2 for supine to sit transfers with poor sitting balance.  Requires Gracia-modA to maintain sitting on EOB.  Attempted sit to stand numerous trials but unable to clear buttocks. He is perseverative on standing and requires constant redirection.  Patient is well below his functional baseline and may benefit from SNF rehab to progress to more independent level of function.    Patient will  Impaired  Orientation Level: Oriented to place;Disoriented to situation;Disoriented to time;Disoriented to place;Oriented to person (fluctuating to place and time during session)  Cognition  Overall Cognitive Status: Exceptions  Arousal/Alertness: Delayed responses to stimuli  Following Commands: Follows one step commands with increased time;Follows one step commands with repetition;Inconsistently follows commands  Attention Span: Difficulty attending to directions;Difficulty dividing attention;Unable to maintain attention  Memory: Decreased recall of recent events;Decreased short term memory;Decreased long term memory;Decreased recall of precautions  Safety Judgement: Decreased awareness of need for assistance;Decreased awareness of need for safety  Problem Solving: Assistance required to implement solutions;Decreased awareness of errors;Assistance required to identify errors made;Assistance required to generate solutions;Assistance required to correct errors made  Insights: Not aware of deficits  Initiation: Requires cues for all  Sequencing: Requires cues for all  Cognition Comment: lethargic requiring max cues for reorientation and attention to task, fair carryover    Hearing:   Hearing  Hearing: Within functional limits    Vision/Perceptual:          Vision  Vision: Impaired  Vision Exceptions: Wears glasses for reading  Tracking: Unable to test secondary to decreased visual attention       Strength:    Strength: Generally decreased, functional    Tone & Sensation:   Tone: Normal  Sensation: Intact    Coordination:  Coordination: Generally decreased, functional    Range Of Motion:  AROM: Generally decreased, functional  PROM: Generally decreased, functional    Functional Mobility:  Bed Mobility:     Bed Mobility Training  Bed Mobility Training: Yes  Overall Level of Assistance: Maximum assistance;Total assistance;Assist X2  Interventions: Demonstration;Manual cues;Tactile cues;Verbal cues;Visual cues;Safety

## 2024-10-24 ENCOUNTER — APPOINTMENT (OUTPATIENT)
Facility: HOSPITAL | Age: 74
End: 2024-10-24
Payer: MEDICARE

## 2024-10-24 LAB
ANION GAP SERPL CALC-SCNC: 7 MMOL/L (ref 2–12)
APTT PPP: 27.9 SEC (ref 22.1–31)
BUN SERPL-MCNC: 22 MG/DL (ref 6–20)
BUN/CREAT SERPL: 13 (ref 12–20)
CALCIUM SERPL-MCNC: 9.3 MG/DL (ref 8.5–10.1)
CHLORIDE SERPL-SCNC: 103 MMOL/L (ref 97–108)
CO2 SERPL-SCNC: 24 MMOL/L (ref 21–32)
COMMENT:: NORMAL
COMMENT:: NORMAL
CREAT SERPL-MCNC: 1.7 MG/DL (ref 0.7–1.3)
ERYTHROCYTE [DISTWIDTH] IN BLOOD BY AUTOMATED COUNT: 13.4 % (ref 11.5–14.5)
GLUCOSE BLD STRIP.AUTO-MCNC: 100 MG/DL (ref 65–117)
GLUCOSE SERPL-MCNC: 115 MG/DL (ref 65–100)
HCT VFR BLD AUTO: 40.9 % (ref 36.6–50.3)
HGB BLD-MCNC: 13.2 G/DL (ref 12.1–17)
INR PPP: 1.2 (ref 0.9–1.1)
MAGNESIUM SERPL-MCNC: 2.2 MG/DL (ref 1.6–2.4)
MCH RBC QN AUTO: 29.3 PG (ref 26–34)
MCHC RBC AUTO-ENTMCNC: 32.3 G/DL (ref 30–36.5)
MCV RBC AUTO: 90.9 FL (ref 80–99)
NRBC # BLD: 0 K/UL (ref 0–0.01)
NRBC BLD-RTO: 0 PER 100 WBC
PHOSPHATE SERPL-MCNC: 4 MG/DL (ref 2.6–4.7)
PLATELET # BLD AUTO: 128 K/UL (ref 150–400)
PMV BLD AUTO: 11.2 FL (ref 8.9–12.9)
POTASSIUM SERPL-SCNC: 4.8 MMOL/L (ref 3.5–5.1)
PROTHROMBIN TIME: 12.2 SEC (ref 9–11.1)
RBC # BLD AUTO: 4.5 M/UL (ref 4.1–5.7)
SERVICE CMNT-IMP: NORMAL
SODIUM SERPL-SCNC: 134 MMOL/L (ref 136–145)
SPECIMEN HOLD: NORMAL
SPECIMEN HOLD: NORMAL
THERAPEUTIC RANGE: NORMAL SECS (ref 58–77)
UFH PPP CHRO-ACNC: <0.1 IU/ML
WBC # BLD AUTO: 9.4 K/UL (ref 4.1–11.1)

## 2024-10-24 PROCEDURE — 83735 ASSAY OF MAGNESIUM: CPT

## 2024-10-24 PROCEDURE — 70498 CT ANGIOGRAPHY NECK: CPT

## 2024-10-24 PROCEDURE — 6370000000 HC RX 637 (ALT 250 FOR IP): Performed by: NURSE PRACTITIONER

## 2024-10-24 PROCEDURE — 6360000004 HC RX CONTRAST MEDICATION: Performed by: RADIOLOGY

## 2024-10-24 PROCEDURE — 82962 GLUCOSE BLOOD TEST: CPT

## 2024-10-24 PROCEDURE — 6370000000 HC RX 637 (ALT 250 FOR IP): Performed by: PHYSICIAN ASSISTANT

## 2024-10-24 PROCEDURE — 36415 COLL VENOUS BLD VENIPUNCTURE: CPT

## 2024-10-24 PROCEDURE — 85027 COMPLETE CBC AUTOMATED: CPT

## 2024-10-24 PROCEDURE — APPNB45 APP NON BILLABLE 31-45 MINUTES: Performed by: NURSE PRACTITIONER

## 2024-10-24 PROCEDURE — 85610 PROTHROMBIN TIME: CPT

## 2024-10-24 PROCEDURE — 0042T CT BRAIN PERFUSION: CPT

## 2024-10-24 PROCEDURE — 83036 HEMOGLOBIN GLYCOSYLATED A1C: CPT

## 2024-10-24 PROCEDURE — 93005 ELECTROCARDIOGRAM TRACING: CPT

## 2024-10-24 PROCEDURE — 1100000000 HC RM PRIVATE

## 2024-10-24 PROCEDURE — 80048 BASIC METABOLIC PNL TOTAL CA: CPT

## 2024-10-24 PROCEDURE — 85730 THROMBOPLASTIN TIME PARTIAL: CPT

## 2024-10-24 PROCEDURE — 70551 MRI BRAIN STEM W/O DYE: CPT

## 2024-10-24 PROCEDURE — 85520 HEPARIN ASSAY: CPT

## 2024-10-24 PROCEDURE — 84100 ASSAY OF PHOSPHORUS: CPT

## 2024-10-24 PROCEDURE — 70450 CT HEAD/BRAIN W/O DYE: CPT

## 2024-10-24 PROCEDURE — 2580000003 HC RX 258: Performed by: PHYSICIAN ASSISTANT

## 2024-10-24 PROCEDURE — 6360000002 HC RX W HCPCS

## 2024-10-24 RX ORDER — IOPAMIDOL 755 MG/ML
100 INJECTION, SOLUTION INTRAVASCULAR
Status: COMPLETED | OUTPATIENT
Start: 2024-10-24 | End: 2024-10-24

## 2024-10-24 RX ORDER — HEPARIN SODIUM 10000 [USP'U]/100ML
5-30 INJECTION, SOLUTION INTRAVENOUS CONTINUOUS
Status: DISCONTINUED | OUTPATIENT
Start: 2024-10-24 | End: 2024-10-28

## 2024-10-24 RX ORDER — HEPARIN SODIUM 1000 [USP'U]/ML
10000 INJECTION, SOLUTION INTRAVENOUS; SUBCUTANEOUS PRN
Status: DISCONTINUED | OUTPATIENT
Start: 2024-10-24 | End: 2024-10-28

## 2024-10-24 RX ORDER — HEPARIN SODIUM 1000 [USP'U]/ML
5000 INJECTION, SOLUTION INTRAVENOUS; SUBCUTANEOUS PRN
Status: DISCONTINUED | OUTPATIENT
Start: 2024-10-24 | End: 2024-10-28

## 2024-10-24 RX ORDER — HEPARIN SODIUM 1000 [USP'U]/ML
10000 INJECTION, SOLUTION INTRAVENOUS; SUBCUTANEOUS ONCE
Status: COMPLETED | OUTPATIENT
Start: 2024-10-24 | End: 2024-10-24

## 2024-10-24 RX ADMIN — HEPARIN SODIUM 10000 UNITS: 1000 INJECTION INTRAVENOUS; SUBCUTANEOUS at 20:27

## 2024-10-24 RX ADMIN — RISPERIDONE 1 MG: 1 TABLET, FILM COATED ORAL at 08:35

## 2024-10-24 RX ADMIN — ACETAMINOPHEN 650 MG: 325 TABLET ORAL at 13:27

## 2024-10-24 RX ADMIN — HEPARIN SODIUM 14 UNITS/KG/HR: 10000 INJECTION, SOLUTION INTRAVENOUS at 20:34

## 2024-10-24 RX ADMIN — IOPAMIDOL 40 ML: 755 INJECTION, SOLUTION INTRAVENOUS at 12:19

## 2024-10-24 RX ADMIN — IOPAMIDOL 80 ML: 755 INJECTION, SOLUTION INTRAVENOUS at 12:19

## 2024-10-24 RX ADMIN — SODIUM CHLORIDE, PRESERVATIVE FREE 10 ML: 5 INJECTION INTRAVENOUS at 13:24

## 2024-10-24 ASSESSMENT — PAIN SCALES - GENERAL
PAINLEVEL_OUTOF10: 8
PAINLEVEL_OUTOF10: 0

## 2024-10-24 ASSESSMENT — PAIN SCALES - WONG BAKER: WONGBAKER_NUMERICALRESPONSE: NO HURT

## 2024-10-24 ASSESSMENT — PAIN DESCRIPTION - DESCRIPTORS: DESCRIPTORS: ACHING;DULL

## 2024-10-24 ASSESSMENT — PAIN DESCRIPTION - LOCATION: LOCATION: BACK

## 2024-10-24 ASSESSMENT — PAIN DESCRIPTION - ORIENTATION: ORIENTATION: POSTERIOR

## 2024-10-24 NOTE — PLAN OF CARE
Problem: Safety - Adult  Goal: Free from fall injury  Outcome: Progressing     Problem: Chronic Conditions and Co-morbidities  Goal: Patient's chronic conditions and co-morbidity symptoms are monitored and maintained or improved  Outcome: Progressing  Flowsheets (Taken 10/23/2024 1018 by Suzanne Rob LPN)  Care Plan - Patient's Chronic Conditions and Co-Morbidity Symptoms are Monitored and Maintained or Improved: Monitor and assess patient's chronic conditions and comorbid symptoms for stability, deterioration, or improvement     Problem: Discharge Planning  Goal: Discharge to home or other facility with appropriate resources  Outcome: Progressing  Flowsheets (Taken 10/23/2024 1018 by Suzanne Rob LPN)  Discharge to home or other facility with appropriate resources: Identify barriers to discharge with patient and caregiver     Problem: Pain  Goal: Verbalizes/displays adequate comfort level or baseline comfort level  Outcome: Progressing     Problem: Risk for Elopement  Goal: Patient will not exit the unit/facility without proper excort  Outcome: Progressing     Problem: Occupational Therapy - Adult  Goal: By Discharge: Performs self-care activities at highest level of function for planned discharge setting.  See evaluation for individualized goals.  Description: FUNCTIONAL STATUS PRIOR TO ADMISSION: Patient is a limited historian (A&O x2-3), reports he is IND for ADLs/IADLs and mobility with no AD, lives with his wife who is currently receiving rehab at [facility]. Hx of bipolar dx with poor medication management, as well as CVA.     Occupational Therapy Goals:  Initiated 10/23/2024  1.  Patient will perform at least 2 grooming tasks in unsupported sitting with Minimal Assist within 7 day(s).  2.  Patient will perform seated bathing with Moderate Assist & AE PRN within 7 day(s).  3.  Patient will perform lower body dressing with Maximal Assist & AE PRN within 7 day(s).  4.  Patient will perform

## 2024-10-24 NOTE — PROGRESS NOTES
1626- EKG not yet completed from earlier order. Verified with Misti CONTRERAS to get EKG. Called EKG and tech will come to perform as patient was off of floor at CT.

## 2024-10-24 NOTE — PROGRESS NOTES
1820- CBC, PT, PTT, Heparin Anit-Xa drawn and sent to the lab. Waiting on pharmacy to send Heparin drip IV.   PTT/Anti-XA to be drawn Q6h after start of drip, and after any rate changes. After two consecutive results, monitor labs once daily in the morning per protocol. Tele # [id number], EKG has been completed and on the chart.

## 2024-10-24 NOTE — PROGRESS NOTES
Chart reviewed, cleared by RN for PT tx.  Prior to session, code stroke activate on pt d/t LE weakness and pt AMANUEL for work up. Will defer and follow up as able and appropriate.     Jhoan Mccurdy, PT

## 2024-10-24 NOTE — PROGRESS NOTES
[address], [place] [address]  Phone: [phone]   [phone]    [email or web address]     Nephrology Progress Note    Patient Name : Mani Estrella      : 1950     MRN : [id number]  Date of Admission : 10/19/2024  Date of Servive : 10/24/24    CC:  Follow up for CKD       Assessment and Plan   ANNAMARIA on ? CKD:  - suspect contrast in the setting of thiazide/loop diuretics + ACEi and metformin  - no obstructive issues on U/S, cysts noted on R kidney  - Cr stable  - ok to cont ACEi  - hold diuretics and metformin  - no IVF needed  - ok for psych admission from nephrology standpoint     CKD3a:  - unclear baseline, appears close to 1.5 to 1.7     Mild hyponatremia:  - suspect 2/2 thiazide use  - mild, cont to monitor     HTN  DM2  Obesity  KELSEY     Interval History:  Seen and examined.  Resting in bed.  Sitter at bedside. Awaiting transfer to psych.   No cp or sob, n/v/d    Review of Systems: Pertinent items are noted in HPI.    Current Medications:   Current Facility-Administered Medications   Medication Dose Route Frequency    sodium chloride flush 0.9 % injection 5-40 mL  5-40 mL IntraVENous 2 times per day    sodium chloride flush 0.9 % injection 5-40 mL  5-40 mL IntraVENous PRN    0.9 % sodium chloride infusion   IntraVENous PRN    ondansetron (ZOFRAN-ODT) disintegrating tablet 4 mg  4 mg Oral Q8H PRN    Or    ondansetron (ZOFRAN) injection 4 mg  4 mg IntraVENous Q6H PRN    polyethylene glycol (GLYCOLAX) packet 17 g  17 g Oral Daily PRN    acetaminophen (TYLENOL) tablet 650 mg  650 mg Oral Q6H PRN    Or    acetaminophen (TYLENOL) suppository 650 mg  650 mg Rectal Q6H PRN    traZODone (DESYREL) tablet 50 mg  50 mg Oral 4x Daily PRN    risperiDONE (RISPERDAL) tablet 1 mg  1 mg Oral BID    risperiDONE (RISPERDAL) tablet 1 mg  1 mg Oral BID PRN    hydrOXYzine HCl (ATARAX) tablet 50 mg  50 mg Oral 4x Daily PRN    OLANZapine (ZyPREXA) 5 mg in

## 2024-10-24 NOTE — PLAN OF CARE
Problem: Chronic Conditions and Co-morbidities  Goal: Patient's chronic conditions and co-morbidity symptoms are monitored and maintained or improved  10/24/2024 1113 by Therese Gaines RN  Outcome: Progressing  Flowsheets  Taken 10/24/2024 1113  Care Plan - Patient's Chronic Conditions and Co-Morbidity Symptoms are Monitored and Maintained or Improved:   Monitor and assess patient's chronic conditions and comorbid symptoms for stability, deterioration, or improvement   Collaborate with multidisciplinary team to address chronic and comorbid conditions and prevent exacerbation or deterioration  Taken 10/24/2024 0841  Care Plan - Patient's Chronic Conditions and Co-Morbidity Symptoms are Monitored and Maintained or Improved:   Monitor and assess patient's chronic conditions and comorbid symptoms for stability, deterioration, or improvement   Collaborate with multidisciplinary team to address chronic and comorbid conditions and prevent exacerbation or deterioration  Note: Patient seen by Nephro and awaiting Psych admission. Holding Metformin and diuretics per MD. Patient is very lethargic this morning. Able to state his birthdate and name, but sleepy and weak to follow through on verbal commands to move extremeties.  10/23/2024 2325 by Sweta Carver LPN  Outcome: Progressing  Flowsheets (Taken 10/23/2024 1018 by Suzanne Rob LPN)  Care Plan - Patient's Chronic Conditions and Co-Morbidity Symptoms are Monitored and Maintained or Improved: Monitor and assess patient's chronic conditions and comorbid symptoms for stability, deterioration, or improvement     Problem: Safety - Adult  Goal: Free from fall injury  10/24/2024 1113 by Therese Gaines, RN  Outcome: Progressing  Flowsheets (Taken 10/24/2024 0848)  Free From Fall Injury: Instruct family/caregiver on patient safety  10/23/2024 2325 by Sweta Carver LPN  Outcome: Progressing

## 2024-10-24 NOTE — CONSULTS
[facility]  PSYCHIATRY CONSULT NOTE:    Name: Mani Estrella  MR#: [id number]  : 1950  ACCOUNT#: [id number]  ADMIT DATE: 10/19/2024    REASON FOR CONSULT: TDO bed hold    Interval update:  10/24/2024  Mr. Estrella seen today while resting in bed comfortably and sitter at bedside.  I did get reports from case management and sitter initially that he was much more groggy today and not as alert and able to participate in his self-care like dating as he was yesterday.    During my interview with him today he was arousable readily by calling his nam and was able to communicate needs in a meaningful way to include wanting a sip of water and needing to change position.    Risperidone 1 mg twice daily was started 2 days ago which she has responded well and reported symptoms of dillon and racing thoughts had stopped and this was quite notable yesterday during my visit as well as him informing me.  He was experiencing less hyperverbal episodes and grandiosity as well.    Briefly evaluated cranial nerves CN VII and  CN XII both equally intact and week     10/23/2024  Mr. Estrella is much improved in his presentation today.  Working with physical therapy when I went into his room.  He got up to sit on the edge of his bed and was initially groggy but was arousable and became more alert and attentive.  He did remember me from the emergency department as well and said a prayer with me before I left.  He is a .  He reports his mind is much more \"clear and not racing and feels better\".  Denies any thoughts of suicide, homicide, having any audio or visual hallucinations and as noted denies any further racing thoughts.  Reports he slept better and his appetite is good and denies anxiety or other concerning symptoms today.      10/22/2024  Mani resting today when seen. Only slight mumble but otherwise remained asleep during visit. Nursing reports he had done well over night after ordered risperidone given and slept  well.  This morning he became more restless on waking and was not given  morning risperidone dose,  but given ativan which he has been somnolent ever since. He may have been more agitated not willing to take risperidone, not known at this time. However, he has done well once taken.     Please refrain from administering ativan at all costs as this will keep him longer in ER        HISTORY OF PRESENTING COMPLAINT:  Mani Estrella is a 74 y.o. male presently at [facility] emergency department as a TDO awaiting hearing.  Was admitted on 10/20, would expect hearing by 10/23.  Mr. 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  Finished or the tech.    He does endorse a history of bipolar and depression but denies ever taking anything other than citalopram which is a concern due to this being an SSRI only.    His medication records do show Abilify 15 mg and Rexulti 1 mg which I will resume.    I do have a difficult time obtaining meaningful  information due to his inability to focus and wanting to pray the duration of our meeting.     He has become agitated quickly per nursing where he throws items in the room if he does not want them close by.  When I initially entered the room he handed me a cup of milk and informed me to throw it away and would not accept my statement that I would ask the teck or nurse to help once we were finished with assessment.     Otherwise, as stated he was quite pleasant but unaware of the significance of what was occurring and presenting with a delusional mindset and grandiosity stating \"it does not matter when the  is here God will be with me it will be what it will be\".     He denies to me any history of inpatient admissions.  Denies any concerns presently to include insomnia, anxiety,

## 2024-10-24 NOTE — PROGRESS NOTES
[facility] Adult  Hospitalist Group                                                                                          Hospitalist Progress Note  Misti Wiley PA-C  Office Phone: [phone]        Date of Service:  10/24/2024  NAME:  Mani Estrella  :  1950  MRN:  [id number]       Admission Summary:   Mani Estrella is a 74 y.o. male with a PMHx of HTN, HLD, T2DM, KELSEY, and CVA.      He initially presented to the ED on 10/19/2024 with complaints of chest pain. His workup at that time was unremarkable and he was to be discharged home however be began to act erratically, family confirmed he has a history of Bipolar disorder for which he has not been taking his medications. He was evaluated by [facility] and ultimately a TDO was obtained. While awaiting Psyhiatric admission, the patient's labs demonstrated acute renal insufficiency. Hospitalist was consulted for workup of this.      At bedside this morning the patient is somnolent and only minimally responsive to painful stimuli. RN reports the patient has just received a dose of IV ativan. Due to this history is primarily obtained via chart review.        Interval history / Subjective:          Significant event ~11:30AM:   Discussed pt with psych prior to rounding on pt this am, psych made me aware pt was lethargic and believes it could be from the risperidone and have subsequently decreased the dose. I presented bedside after this conversation to find patient laying in bed, lethargic but oriented. Per nursing staff, pt was alert according to night nurse. Pt is following commands and answering questions appropriately, generalized weakness noted but R sided weakness to upper and lower extremities notably worse than L. Patient also notes tingling to his RLE that he states feels like his leg fell asleep. Subsequently called code stroke. Ordered dry CT, EKG, POC BG (100), CBC/BMP, placed pt on tele.   Discussed with NIS. Will place Neurology  Accuchecks, Hypoglycemia protocol     HTN  - Holding home diuretics, resume when renal function improves  - Continue lisinopril and amlodipine - hold      HLD  - Continue statin     Hx of CVA  - HTN, HLD and DM management as above         CRITICAL CARE ATTESTATION:  I had a face to face encounter with the patient, reviewed and interpreted patient data including clinical events, labs, images, vital signs, I/O's, and examined patient.  I have discussed the case and the plan and management of the patient's care with the consulting services, the bedside nurses and necessary ancillary providers.       NOTE OF PERSONAL INVOLVEMENT IN CARE   This patient has a high probability of imminent, clinically significant deterioration, which requires the highest level of preparedness to intervene urgently. I participated in the decision-making and personally managed or directed the management of the following life and organ supporting interventions that required my frequent assessment to treat or prevent imminent deterioration.     I personally spent 65 minutes of critical care time.  This is time spent at this critically ill patient's bedside actively involved in patient care as well as the coordination of care and discussions with the patient's family.  This does not include any procedural time which has been billed separately.          Code status: Full  Prophylaxis: SCD's, heparin gtt   Care Plan discussed with: patient, nursing, attending  Anticipated Disposition: TBD  Inpatient  Cardiac monitoring: None  Central Line:            Social Determinants of Health     Tobacco Use: Medium Risk (4/8/2024)    Received from [facility]    Patient History     Smoking Tobacco Use: Former     Smokeless Tobacco Use: Never     Passive Exposure: Not on file   Alcohol Use: Not At Risk (3/13/2019)    Received from [facility]  (prior to 6/17/2023)    AUDIT-C     Frequency of Alcohol Consumption: Never     Average Number of

## 2024-10-24 NOTE — CARE COORDINATION
JIM:    CM received call from jeffy Groves [phone] who voiced that she would like her stepfather's mental health \"treatment\" to start while at [facility]. She stated that he has an outpatient provider that she was hoping to come see the Pt while at [facility]. This provider is on [address] but she could not recall this person's name. CM explained that Pt is being seen by [facility] and Psych while at [facility].     Jasmin requests a call from the nurse and/or attending for a medical update. She expressed concern about the patient's fluid levels.    She states that the Pt started vomiting while at [facility] visiting wife and that his aggressive behavior started only once at [facility]. She states that nothing like this has ever happened before.     Jasmin works but states that she might be able to assist with transport. The patient's vehicle is at [facility].     CM attempted initial assessment. Pt was unresponsive. Nishant reported that Pt has been \"getting worse\" and is unable to feed self. CM updated psych about nishant's report. Psych to f/u with hospitalist     Care Management Initial Assessment       RUR: 10%  Readmission? No  1st IM letter given?      10/24/24 1121   Service Assessment   Patient Orientation Unresponsive   Cognition Other (see comment)  (somnolescent)   History Provided By Child/Family   Primary Caregiver Self   Support Systems Family Members   Patient's Healthcare Decision Maker is: Legal Next of Kin   Can patient return to prior living arrangement Unknown at present   Ability to make needs known: Unable   Social/Functional History   Lives With Spouse   Type of Home House   Discharge Planning   Patient expects to be discharged to: Unknown

## 2024-10-24 NOTE — PROGRESS NOTES
Occupational Therapy  10/24/24    Chart reviewed, discussed with hospitalist PA. Patient with Code S called, currently AMANUEL for CT d/t new RLE weakness and numbness/tingling. Will defer and follow up as able/appropriate.     Thank you,   Tiki Cheney, OTD, OTR/L

## 2024-10-24 NOTE — PROGRESS NOTES
Neurocritical Care Code Stroke Documentation      Symptoms:   Right-sided weakness, right leg tingling, decreased LOC  Blood glucose 100  BP 98/58   Last Known Well: Unclear LKW, but patient reports he noticed symptoms earlier this morning, but he is not able to give me a specific time.   Medical hx: Pt admitted 10/19/2024. He initially had complaints of chest pain, however, workup was unremarkable per review of notes. He was discharged home, however he began to act erratically. Patient had a hx of bipolar disorder and was not taking his medications. He was seen by [facility] and had a TDO obtained. He had labs drawn which showed acute renal insufficiency and the Hospitalist and nephrologist has been following. ANNAMARIA has improved.     Remote CVA, but pt reports no residual deficits.   Hyperlipidemia  Diabetes  KELSEY  Past Medical History:   Diagnosis Date    Hypertension     Ill-defined condition     chronic lower back pain    Stroke (HCC)     2015      Anticoagulation: None    VAN:   Negative   NIHSS:   1a-LOC:1    1b-Month/Age:0    1c-Open/Close Hand:0    2-Best Gaze:0    3-Visual Fields:0    4-Facial Palsy:0    5a-Left Arm:0    5b-Right Arm:1    6a-Left Le    6b-Right Leg:3    7-Limb Ataxia:0    8-Sensory:0    9-Best Language:0    10-Dysarthria:0    11-Extinction/Inattention:0  TOTAL SCORE:6   Imaging:   CT: No acute abnormality.     CTA/CTP:    IMPRESSION:  CTA Head:  1. No evidence of significant stenosis or aneurysm.     CTA Neck:  1. No evidence of significant stenosis.  2. Acute segmental and subsegmental pulmonary emboli in the right upper lobe.  3. Multinodular goiter. Outpatient thyroid ultrasound is recommended for further evaluation.     CT Brain Perfusion:  1. No acute abnormality.   Plan:   TNK Candidate: NO    Mechanical thrombectomy Candidate: NO   Dr. Moralez with [employer] wanted to obtain CTA/CTP so it was ordered.   Patient will need MRI of Brain to assess for CVA  Pt reports issues with back  pain PTA    Discussed with Misti Wiley, Hospitalist NP.    Arrival time: 1141  Time spent: 45 minutes.     MU Cain - NP

## 2024-10-25 ENCOUNTER — APPOINTMENT (OUTPATIENT)
Facility: HOSPITAL | Age: 74
End: 2024-10-25
Payer: MEDICARE

## 2024-10-25 ENCOUNTER — TELEPHONE (OUTPATIENT)
Age: 74
End: 2024-10-25

## 2024-10-25 LAB
ANION GAP SERPL CALC-SCNC: 5 MMOL/L (ref 2–12)
BASOPHILS # BLD: 0.1 K/UL (ref 0–0.1)
BASOPHILS NFR BLD: 1 % (ref 0–1)
BUN SERPL-MCNC: 25 MG/DL (ref 6–20)
BUN/CREAT SERPL: 24 (ref 12–20)
CALCIUM SERPL-MCNC: 9.4 MG/DL (ref 8.5–10.1)
CHLORIDE SERPL-SCNC: 100 MMOL/L (ref 97–108)
CHOLEST SERPL-MCNC: 107 MG/DL
CO2 SERPL-SCNC: 25 MMOL/L (ref 21–32)
CREAT SERPL-MCNC: 1.06 MG/DL (ref 0.7–1.3)
DIFFERENTIAL METHOD BLD: ABNORMAL
EKG ATRIAL RATE: 88 BPM
EKG DIAGNOSIS: NORMAL
EKG P AXIS: 47 DEGREES
EKG P-R INTERVAL: 204 MS
EKG Q-T INTERVAL: 374 MS
EKG QRS DURATION: 90 MS
EKG QTC CALCULATION (BAZETT): 452 MS
EKG R AXIS: -27 DEGREES
EKG T AXIS: 19 DEGREES
EKG VENTRICULAR RATE: 88 BPM
EOSINOPHIL # BLD: 0.2 K/UL (ref 0–0.4)
EOSINOPHIL NFR BLD: 2 % (ref 0–7)
ERYTHROCYTE [DISTWIDTH] IN BLOOD BY AUTOMATED COUNT: 13.1 % (ref 11.5–14.5)
EST. AVERAGE GLUCOSE BLD GHB EST-MCNC: 128 MG/DL
GLUCOSE SERPL-MCNC: 137 MG/DL (ref 65–100)
HBA1C MFR BLD: 6.1 % (ref 4–5.6)
HCT VFR BLD AUTO: 41.2 % (ref 36.6–50.3)
HDLC SERPL-MCNC: 36 MG/DL
HDLC SERPL: 3 (ref 0–5)
HGB BLD-MCNC: 13.8 G/DL (ref 12.1–17)
IMM GRANULOCYTES # BLD AUTO: 0.1 K/UL (ref 0–0.04)
IMM GRANULOCYTES NFR BLD AUTO: 1 % (ref 0–0.5)
LDLC SERPL CALC-MCNC: 52.2 MG/DL (ref 0–100)
LYMPHOCYTES # BLD: 1.9 K/UL (ref 0.8–3.5)
LYMPHOCYTES NFR BLD: 22 % (ref 12–49)
MAGNESIUM SERPL-MCNC: 2.3 MG/DL (ref 1.6–2.4)
MCH RBC QN AUTO: 29.8 PG (ref 26–34)
MCHC RBC AUTO-ENTMCNC: 33.5 G/DL (ref 30–36.5)
MCV RBC AUTO: 89 FL (ref 80–99)
MONOCYTES # BLD: 1 K/UL (ref 0–1)
MONOCYTES NFR BLD: 11 % (ref 5–13)
NEUTS SEG # BLD: 5.8 K/UL (ref 1.8–8)
NEUTS SEG NFR BLD: 63 % (ref 32–75)
NRBC # BLD: 0 K/UL (ref 0–0.01)
NRBC BLD-RTO: 0 PER 100 WBC
PHOSPHATE SERPL-MCNC: 3.6 MG/DL (ref 2.6–4.7)
PLATELET # BLD AUTO: 142 K/UL (ref 150–400)
PMV BLD AUTO: 10.8 FL (ref 8.9–12.9)
POTASSIUM SERPL-SCNC: 4.1 MMOL/L (ref 3.5–5.1)
RBC # BLD AUTO: 4.63 M/UL (ref 4.1–5.7)
SODIUM SERPL-SCNC: 130 MMOL/L (ref 136–145)
TRIGL SERPL-MCNC: 94 MG/DL
UFH PPP CHRO-ACNC: 0.36 IU/ML
UFH PPP CHRO-ACNC: 0.42 IU/ML
UFH PPP CHRO-ACNC: 0.46 IU/ML
UFH PPP CHRO-ACNC: 0.72 IU/ML
VLDLC SERPL CALC-MCNC: 18.8 MG/DL
WBC # BLD AUTO: 9 K/UL (ref 4.1–11.1)

## 2024-10-25 PROCEDURE — 86147 CARDIOLIPIN ANTIBODY EA IG: CPT

## 2024-10-25 PROCEDURE — 80048 BASIC METABOLIC PNL TOTAL CA: CPT

## 2024-10-25 PROCEDURE — 36415 COLL VENOUS BLD VENIPUNCTURE: CPT

## 2024-10-25 PROCEDURE — 2580000003 HC RX 258

## 2024-10-25 PROCEDURE — 6360000002 HC RX W HCPCS

## 2024-10-25 PROCEDURE — 99232 SBSQ HOSP IP/OBS MODERATE 35: CPT | Performed by: NURSE PRACTITIONER

## 2024-10-25 PROCEDURE — 85598 HEXAGNAL PHOSPH PLTLT NEUTRL: CPT

## 2024-10-25 PROCEDURE — 85303 CLOT INHIBIT PROT C ACTIVITY: CPT

## 2024-10-25 PROCEDURE — 84100 ASSAY OF PHOSPHORUS: CPT

## 2024-10-25 PROCEDURE — 97530 THERAPEUTIC ACTIVITIES: CPT

## 2024-10-25 PROCEDURE — 6370000000 HC RX 637 (ALT 250 FOR IP): Performed by: NURSE PRACTITIONER

## 2024-10-25 PROCEDURE — 85520 HEPARIN ASSAY: CPT

## 2024-10-25 PROCEDURE — 85730 THROMBOPLASTIN TIME PARTIAL: CPT

## 2024-10-25 PROCEDURE — 85610 PROTHROMBIN TIME: CPT

## 2024-10-25 PROCEDURE — 85300 ANTITHROMBIN III ACTIVITY: CPT

## 2024-10-25 PROCEDURE — 81240 F2 GENE: CPT

## 2024-10-25 PROCEDURE — 85306 CLOT INHIBIT PROT S FREE: CPT

## 2024-10-25 PROCEDURE — 93010 ELECTROCARDIOGRAM REPORT: CPT | Performed by: SPECIALIST

## 2024-10-25 PROCEDURE — 97168 OT RE-EVAL EST PLAN CARE: CPT

## 2024-10-25 PROCEDURE — 86146 BETA-2 GLYCOPROTEIN ANTIBODY: CPT

## 2024-10-25 PROCEDURE — 97116 GAIT TRAINING THERAPY: CPT

## 2024-10-25 PROCEDURE — 80061 LIPID PANEL: CPT

## 2024-10-25 PROCEDURE — 85670 THROMBIN TIME PLASMA: CPT

## 2024-10-25 PROCEDURE — 97535 SELF CARE MNGMENT TRAINING: CPT

## 2024-10-25 PROCEDURE — 85732 THROMBOPLASTIN TIME PARTIAL: CPT

## 2024-10-25 PROCEDURE — 81241 F5 GENE: CPT

## 2024-10-25 PROCEDURE — 1100000000 HC RM PRIVATE

## 2024-10-25 PROCEDURE — 6370000000 HC RX 637 (ALT 250 FOR IP): Performed by: PHYSICIAN ASSISTANT

## 2024-10-25 PROCEDURE — 83735 ASSAY OF MAGNESIUM: CPT

## 2024-10-25 PROCEDURE — 2580000003 HC RX 258: Performed by: PHYSICIAN ASSISTANT

## 2024-10-25 PROCEDURE — 85613 RUSSELL VIPER VENOM DILUTED: CPT

## 2024-10-25 PROCEDURE — 85025 COMPLETE CBC W/AUTO DIFF WBC: CPT

## 2024-10-25 RX ORDER — HALOPERIDOL 2 MG/1
2 TABLET ORAL EVERY 6 HOURS PRN
Status: DISCONTINUED | OUTPATIENT
Start: 2024-10-25 | End: 2024-10-31 | Stop reason: HOSPADM

## 2024-10-25 RX ORDER — HYDROXYZINE HYDROCHLORIDE 10 MG/1
10 TABLET, FILM COATED ORAL 3 TIMES DAILY PRN
Status: DISCONTINUED | OUTPATIENT
Start: 2024-10-25 | End: 2024-10-31 | Stop reason: HOSPADM

## 2024-10-25 RX ORDER — SODIUM CHLORIDE 9 MG/ML
INJECTION, SOLUTION INTRAVENOUS CONTINUOUS
Status: DISPENSED | OUTPATIENT
Start: 2024-10-25 | End: 2024-10-25

## 2024-10-25 RX ORDER — HALOPERIDOL 5 MG/ML
2 INJECTION INTRAMUSCULAR EVERY 6 HOURS PRN
Status: DISCONTINUED | OUTPATIENT
Start: 2024-10-25 | End: 2024-10-31 | Stop reason: HOSPADM

## 2024-10-25 RX ORDER — RISPERIDONE 1 MG/1
1 TABLET ORAL NIGHTLY
Status: DISCONTINUED | OUTPATIENT
Start: 2024-10-26 | End: 2024-10-31 | Stop reason: HOSPADM

## 2024-10-25 RX ADMIN — HEPARIN SODIUM 13 UNITS/KG/HR: 10000 INJECTION, SOLUTION INTRAVENOUS at 05:26

## 2024-10-25 RX ADMIN — HEPARIN SODIUM 13 UNITS/KG/HR: 10000 INJECTION, SOLUTION INTRAVENOUS at 12:48

## 2024-10-25 RX ADMIN — SODIUM CHLORIDE: 9 INJECTION, SOLUTION INTRAVENOUS at 13:08

## 2024-10-25 RX ADMIN — SODIUM CHLORIDE, PRESERVATIVE FREE 10 ML: 5 INJECTION INTRAVENOUS at 12:52

## 2024-10-25 RX ADMIN — TRAZODONE HYDROCHLORIDE 50 MG: 50 TABLET ORAL at 22:09

## 2024-10-25 RX ADMIN — HYDROXYZINE HYDROCHLORIDE 50 MG: 25 TABLET ORAL at 14:51

## 2024-10-25 RX ADMIN — HYDROXYZINE HYDROCHLORIDE 10 MG: 10 TABLET ORAL at 22:09

## 2024-10-25 RX ADMIN — ACETAMINOPHEN 650 MG: 325 TABLET ORAL at 01:23

## 2024-10-25 RX ADMIN — ACETAMINOPHEN 650 MG: 325 TABLET ORAL at 14:50

## 2024-10-25 RX ADMIN — SODIUM CHLORIDE, PRESERVATIVE FREE 10 ML: 5 INJECTION INTRAVENOUS at 22:09

## 2024-10-25 ASSESSMENT — PAIN SCALES - GENERAL
PAINLEVEL_OUTOF10: 3
PAINLEVEL_OUTOF10: 5
PAINLEVEL_OUTOF10: 5
PAINLEVEL_OUTOF10: 0
PAINLEVEL_OUTOF10: 3

## 2024-10-25 ASSESSMENT — PAIN DESCRIPTION - LOCATION
LOCATION: BACK;NECK
LOCATION: BUTTOCKS
LOCATION: BACK;NECK

## 2024-10-25 ASSESSMENT — PAIN DESCRIPTION - DESCRIPTORS
DESCRIPTORS: ACHING
DESCRIPTORS: ACHING;DULL

## 2024-10-25 ASSESSMENT — PAIN DESCRIPTION - ORIENTATION
ORIENTATION: POSTERIOR
ORIENTATION: POSTERIOR

## 2024-10-25 NOTE — CONSULTS
[facility]  PSYCHIATRY CONSULT NOTE:    Name: Mani Estrella  MR#: [id number]  : 1950  ACCOUNT#: [id number]  ADMIT DATE: 10/19/2024    REASON FOR CONSULT: TDO bed hold    Interval update:  10/25/24- Mani remains acutely disorganized. Stated his mood is \"OK.\" States he feels his mood has somewhat improved from recent days. States he used the bathroom and that made him feel better. States he's eating well without any appetite issues, then subsequently asks me if I'm also eating OK, which I assure him I am. States he's sleeping well. Denies SI/HI/AH/VH but subsequently asked if I'm bothered by the noise in his head, I inquired further about the noise in his head, he states it's deeper than talking, it's \"buzzleward,\" which he spells for me, then states it's his doctor, who plays tricks on his brain, then asks if I know Dr. Whitfield, and asks again if I can hear the noise in his brain.     10/24/2024  Mr. Estrella seen today while resting in bed comfortably and sitter at bedside.  I did get reports from case management and sitter initially that he was much more groggy today and not as alert and able to participate in his self-care like dating as he was yesterday.    During my interview with him today he was arousable readily by calling his nam and was able to communicate needs in a meaningful way to include wanting a sip of water and needing to change position.    Risperidone 1 mg twice daily was started 2 days ago which she has responded well and reported symptoms of dillon and racing thoughts had stopped and this was quite notable yesterday during my visit as well as him informing me.  He was experiencing less hyperverbal episodes and grandiosity as well.    Briefly evaluated cranial nerves CN VII and  CN XII both equally intact and week     10/23/2024  Mr. Estrella is much improved in his presentation today.  Working with physical therapy when I went into his room.  He got up to sit on the edge of his bed      He has become agitated quickly per nursing where he throws items in the room if he does not want them close by.  When I initially entered the room he handed me a cup of milk and informed me to throw it away and would not accept my statement that I would ask the teck or nurse to help once we were finished with assessment.     Otherwise, as stated he was quite pleasant but unaware of the significance of what was occurring and presenting with a delusional mindset and grandiosity stating \"it does not matter when the  is here God will be with me it will be what it will be\".     He denies to me any history of inpatient admissions.  Denies any concerns presently to include insomnia, anxiety, thoughts of suicide, homicide, or any present audio or visual hallucinations per se.  He does however endorse \"I have a lot going on in my mind\", and is prescribed hydroxyzine as needed at home at bed time and diltiazem  mg Er at bed time.      PAST PSYCHIATRIC HISTORY:     From med be consolidation appears to be on Abilify 15 mg, Rexulti 1 mg, diltiazem hydrochloride  mg at bedtime, hydroxyzine 25 mg at bedtime as needed, Celexa 10 mg    SUBSTANCE ABUSE HISTORY: Unknown    PSYCHOSOCIAL HISTORY: Local with wife at [facility]    MENTAL STATUS EXAM:   Mani Estrella is a 74 y.o. Black /  male who appears his/her stated age. He is semi-cooperative with assessment questions. He makes fair eye contact.  No psychomotor agitation/retardation is observed. His speech is hyperverbal, interruptive,  with flight of ideas and normal in volume. His self-reported mood is \"wonderful\". His affect is currently euthymic slightly elevated. +AH of \"Dr. Whitfield\". No paranoia or delusions are elicited with assessment. His thought processes are currently distorted. He denies suicidal and homicidal ideation. He is alert and oriented X 4.  Insight and judgment are limited/poor.    DIAGNOSTIC IMPRESSION:   bipolar 1 with

## 2024-10-25 NOTE — PROGRESS NOTES
1014  Last data filed at 10/25/2024 0821  Gross per 24 hour   Intake --   Output 915 ml   Net -915 ml        Physical Examination:     I had a face to face encounter with this patient and independently examined them on 10/25/2024 as outlined below:       General: no acute distress, oriented x3, following commands, alert  HEENT: PEERL, NC/AT  Neck: Supple, no JVD  Chest: Clear to auscultation bilaterally, RA   CVS: RRR, S1 S2 heard, no murmurs/rubs/gallops  Abd: Soft, non-tender, non-distended, +bowel sounds   : Murphy in place  Ext: No clubbing, no cyanosis, no edema  Neuro: AA&ox3, follows commands, Weakness to all extremities (improved today), 3/5 strength to RLE, 4/5 LLE, neg pronator drift   Pulses: 2+, symmetric in all extremities  Skin: Warm, dry, without rashes or lesions    Data Review:    Review and/or order of clinical lab test  Review and/or order of tests in the radiology section of CPT  Review and/or order of tests in the medicine section of CPT      I have personally and independently reviewed all pertinent labs, diagnostic studies, imaging, and have provided independent interpretation of the same.     Labs:     Recent Labs     10/23/24  0542 10/24/24  1811   WBC 8.8 9.4   HGB 13.3 13.2   HCT 41.0 40.9   * 128*     Recent Labs     10/22/24  1102 10/23/24  0542 10/24/24  0618 10/25/24  0418   * 135* 134*  --    K 4.4 4.1 4.8  --     102 103  --    CO2 24 25 24  --    BUN 20 20 22*  --    MG  --  2.1 2.2 2.3   PHOS  --  3.0 4.0 3.6     No results for input(s): \"ALT\", \"TP\", \"GLOB\", \"GGT\" in the last 72 hours.    Invalid input(s): \"SGOT\", \"GPT\", \"AP\", \"TBIL\", \"TBILI\", \"ALB\", \"AML\", \"AMYP\", \"LPSE\", \"HLPSE\"  Recent Labs     10/24/24  1811   INR 1.2*   APTT 27.9      No results for input(s): \"TIBC\" in the last 72 hours.    Invalid input(s): \"FE\", \"PSAT\", \"FERR\"   No results found for: \"RBCF\"   No results for input(s): \"PH\", \"PCO2\", \"PO2\" in the last 72 hours.  No results for input(s):  \"CPK\" in the last 72 hours.    Invalid input(s): \"CPKMB\", \"CKNDX\", \"TROIQ\"  Lab Results   Component Value Date/Time    CHOL 107 10/25/2024 04:18 AM    HDL 36 10/25/2024 04:18 AM    LDL 52.2 10/25/2024 04:18 AM     No results found for: \"GLUCPOC\"  [unfilled]    Notes reviewed from all clinical/nonclinical/nursing services involved in patient's clinical care. Care coordination discussions were held with appropriate clinical/nonclinical/ nursing providers based on care coordination needs.         Patients current active Medications were reviewed, considered, added and adjusted based on the clinical condition today.      Home Medications were reconciled to the best of my ability given all available resources at the time of admission. Route is PO if not otherwise noted.      Admission Status:[id number]:::1}      Medications Reviewed:     Current Facility-Administered Medications   Medication Dose Route Frequency    heparin (porcine) injection 10,000 Units  10,000 Units IntraVENous PRN    heparin (porcine) injection 5,000 Units  5,000 Units IntraVENous PRN    heparin 25,000 units in dextrose 5% 250 mL (premix) infusion  5-30 Units/kg/hr IntraVENous Continuous    sodium chloride flush 0.9 % injection 5-40 mL  5-40 mL IntraVENous 2 times per day    sodium chloride flush 0.9 % injection 5-40 mL  5-40 mL IntraVENous PRN    0.9 % sodium chloride infusion   IntraVENous PRN    ondansetron (ZOFRAN-ODT) disintegrating tablet 4 mg  4 mg Oral Q8H PRN    Or    ondansetron (ZOFRAN) injection 4 mg  4 mg IntraVENous Q6H PRN    polyethylene glycol (GLYCOLAX) packet 17 g  17 g Oral Daily PRN    acetaminophen (TYLENOL) tablet 650 mg  650 mg Oral Q6H PRN    Or    acetaminophen (TYLENOL) suppository 650 mg  650 mg Rectal Q6H PRN    traZODone (DESYREL) tablet 50 mg  50 mg Oral 4x Daily PRN    [Held by provider] risperiDONE (RISPERDAL) tablet 1 mg  1 mg Oral BID    risperiDONE (RISPERDAL) tablet 1 mg  1 mg Oral BID PRN    hydrOXYzine HCl (ATARAX)

## 2024-10-25 NOTE — CARE COORDINATION
JIM:    Anticipated d/c to rehab ([facility]): IPR recommended by attending and corroborated by therapist; SNF as backup.     CM conducted admission with Pt. He cconfirmed face sheet, however he also articulated numerous tangential comment. Pt did corroborate his address, states that he lives with his wife and his stepson used to live with him as well but is not currently doing so. Pt corroborates that his wife Aurora Groves is at [facility]. He referenced his wife's sister Reba as another member of his overall support system and confirmed that reza Castellanos is his primary contact. He states that he has a cane and that construction is happening at his house. It is somewhat unclear if all that the patient states is accurate; he informed CM that he is having a swimming pool installed in his home and that his PCP is Onel Bolivar and/or Onel Alba, the preciado     CM notified later in the day that Pt may be a good candidate for IPR given stroke findings. CM attempted to talk with Pt about IPR referral but found him fast asleep. CM talked with primary contact jeffy Castellanos about IPR and improved cognition. She agrees with referral to IPR and articulated hope that Pt can go to [facility] as she is also hoping to get her mother transferred to IPR.     CM placed referral in [facility] to [facility] after speaking with stepdau. CM notified attending that stepdau requests call about findings of CVA. Pt's stepdau articulated again that the patient's behavior in the ER and his illness while at [facility] are not his baseline.     Care Management Initial Assessment       RUR: 13%  Readmission? No  1st IM letter given? Yes     10/25/24 9121   Service Assessment   Patient Orientation Alert and Oriented   Cognition Alert   Primary Caregiver Self   Support Systems Family Members   Patient's Healthcare Decision Maker is: Legal Next of Kin   PCP Verified by CM No  (Pt states that he sees \"Onel Bolivra\" -- CM could not find online; May have  been fabrication)   Prior Functional Level Independent in ADLs/IADLs   Current Functional Level Assistance with the following:   Can patient return to prior living arrangement Unknown at present   Ability to make needs known: Poor   Family able to assist with home care needs: Other (comment)  (unclear)   Would you like for me to discuss the discharge plan with any other family members/significant others, and if so, who? Yes   Social/Functional History   Lives With Spouse   Type of Home House   Home Equipment Cane   Discharge Planning   Patient expects to be discharged to: Unknown   Services At/After Discharge   Confirm Follow Up Transport   (unknown)   Condition of Participation: Discharge Planning   The Plan for Transition of Care is related to the following treatment goals: Possible Rehab   The Patient and/or Patient Representative was provided with a Choice of Provider? Patient   The Patient and/Or Patient Representative agree with the Discharge Plan? Yes   Freedom of Choice list was provided with basic dialogue that supports the patient's individualized plan of care/goals, treatment preferences, and shares the quality data associated with the providers?  Yes

## 2024-10-25 NOTE — TELEPHONE ENCOUNTER
Pt needs a hospital follow up appointment  Provider: Any  Location: Any  In person or virtual: Either  When: 4-6 weeks from discharge  Diagnosis/reason for follow up:  Acute embolic stroke   Additional information: Follow up coags     Discussed with Dr. Loredo

## 2024-10-25 NOTE — PLAN OF CARE
Problem: Physical Therapy - Adult  Goal: By Discharge: Performs mobility at highest level of function for planned discharge setting.  See evaluation for individualized goals.  Description: FUNCTIONAL STATUS PRIOR TO ADMISSION: Patient was independent and active without use of DME.    HOME SUPPORT PRIOR TO ADMISSION: The patient lived with wife.  Wife has been in SNF for rehab for the past month.    Physical Therapy Goals  Initiated 10/23/2024  1.  Patient will move from supine to sit and sit to supine in bed with minimal assistance within 7 day(s).    2.  Patient will perform sit to stand with maximal assistance within 7 day(s).  3.  Patient will transfer from bed to chair and chair to bed with maximal assistance using the least restrictive device within 7 day(s).  4.  Patient will ambulate approx 5 feet with modA x 2 within 7 days.  Outcome: Progressing     PHYSICAL THERAPY TREATMENT    Patient: Mani Estrella (74 y.o. male)  Date: 10/25/2024  Diagnosis: ANNAMARIA (acute kidney injury) (HCC) [N17.9]  Acute kidney injury (HCC) [N17.9]  Bipolar affective disorder, currently manic, severe, with psychotic features (HCC) [F31.2]  Non-traumatic rhabdomyolysis [M62.82]  Chest pain, unspecified type [R07.9] Acute kidney injury (HCC)      Precautions: Fall Risk                      ASSESSMENT:  Patient continues to benefit from skilled PT services and is slowly progressing towards goals. Pt presented alert and oriented, however required constant verbal cues to direct to task.  Pt hyperverbal and interruptive throughout session.   Functionally, pt able to come to standing and amb 5 feet with RW.  Given pt's baseline function of independent and active without DME - recommend IPR.           PLAN:  Patient continues to benefit from skilled intervention to address the above impairments.  Continue treatment per established plan of care.    Recommend with staff:  OOB to chair for meals - 2 person assist for safety      Recommendation  for discharge: (in order for the patient to meet his/her long term goals):   High intensity/comprehensive skilled physical therapy in a multidisciplinary setting as patient is working towards tolerating up to 3 hours of therapy/day 5-7x/week    Other factors to consider for discharge: impaired cognition, high risk for falls, and not safe to be alone    IF patient discharges home will need the following DME:  ? rolling walker       SUBJECTIVE:   Patient stated, \"I want to walk a little.  You look granny from [place] \"    OBJECTIVE DATA SUMMARY:   Critical Behavior:  Orientation  Orientation Level: Oriented X4  Cognition  Overall Cognitive Status: Exceptions  Arousal/Alertness: Appears intact  Following Commands: Follows one step commands with increased time;Follows one step commands with repetition  Attention Span: Impaired;Difficulty attending to directions;Difficulty dividing attention;Unable to maintain attention  Memory: Decreased recall of recent events;Decreased short term memory;Decreased long term memory  Safety Judgement: Decreased awareness of need for assistance;Decreased awareness of need for safety  Problem Solving: Assistance required to implement solutions;Decreased awareness of errors;Assistance required to identify errors made;Assistance required to generate solutions  Insights: Not aware of deficits  Initiation: Requires cues for all  Sequencing: Requires cues for all    Functional Mobility Training:  Bed Mobility:  Bed Mobility Training  Overall Level of Assistance: Moderate assistance;Assist X2  Supine to Sit: Moderate assistance;Assist X2  Sit to Supine:  (NT - pt remained in the chair)  Scooting: Total assistance;Assist X2  Transfers:  Transfer Training  Transfer Training: Yes  Overall Level of Assistance: Additional time;Adaptive equipment;Moderate assistance;Assist X1  Interventions: Verbal cues;Safety awareness training;Tactile cues  Sit to Stand: Additional time;Adaptive

## 2024-10-25 NOTE — PLAN OF CARE
Problem: Occupational Therapy - Adult  Goal: By Discharge: Performs self-care activities at highest level of function for planned discharge setting.  See evaluation for individualized goals.  Description: FUNCTIONAL STATUS PRIOR TO ADMISSION: Patient is a limited historian (A&O x2-3), reports he is IND for ADLs/IADLs and mobility with no AD, lives with his wife who is currently receiving rehab at [facility]. Hx of bipolar dx with poor medication management, as well as CVA.     Occupational Therapy Goals:  Re-Evaluation 10/25/2024: Re-evaluation s/p code S with resulting + MRI for Right and Left Frontal Lobe + Left Occipital Lobe Stroke and incidental finding of PE.    Occupational Therapy Goals  Initiated 10/25/2024   1.  Patient will perform grooming standing at sink with Modified Sargent within 7 day(s).  2.  Patient will perform lower body dressing with Moderate Assist within 7 day(s).  3.  Patient will perform toilet transfer with Minimal Assist within 7 day(s).  4.  Patient will perform all aspects of toileting with Moderate Assist within 7 day(s).  5.  Patient will participate in upper extremity therapeutic exercise/activities with Stand by Assist within 7 day(s).    6.  Patient will utilize energy conservation techniques during functional activities with verbal cues within 7 day(s).    Initiated 10/23/2024  1.  Patient will perform at least 2 grooming tasks in unsupported sitting with Minimal Assist within 7 day(s).  2.  Patient will perform seated bathing with Moderate Assist & AE PRN within 7 day(s).  3.  Patient will perform lower body dressing with Maximal Assist & AE PRN within 7 day(s).  4.  Patient will perform toilet transfers to/from [facility] with Maximal Assist x2 within 7 day(s).  5.  Patient will perform all aspects of toileting with Maximal Assist within 7 day(s).  6.  Patient will participate in upper extremity therapeutic exercise/activities with Moderate Assist for 5 minutes within 7  Alex Kemp, Consuelo Duque, Ji Pate, Patrice Montgomery, AM-PAC “6-Clicks” Functional Assessment Scores Predict Acute Care Hospital Discharge Destination, Physical Therapy, Volume 94, Issue 9, 1 September 2014, Pages 3219-4919, [email or web address]://[email or web address]    Pain Rating:  Pt at first reporting generalized pain in LE but was not a barrier.  Pain Intervention(s):   pain is at a level acceptable to the patient    Activity Tolerance:   Good    After treatment:   Patient left in no apparent distress sitting up in chair, Call bell within reach, and Bed/ chair alarm activated    COMMUNICATION/EDUCATION:   The patient's plan of care was discussed with: physical therapist, registered nurse, and certified nursing assistant/patient care technician    Patient Education  Education Given To: Patient  Education Provided: Role of Therapy;Plan of Care  Education Method: Verbal  Barriers to Learning: Cognition  Education Outcome: Continued education needed    Thank you for this referral.  Nessa Pham OT  Minutes: 49 minutes

## 2024-10-25 NOTE — PROGRESS NOTES
[address], [address] [place] [address]  Phone: [phone]   [phone]    [email or web address]     Nephrology Progress Note    Patient Name : Mani Estrella      : 1950     MRN : [id number]  Date of Admission : 10/19/2024  Date of Servive : 10/25/24    CC:  Follow up for CKD       Assessment and Plan   ANNAMARIA on ? CKD:  - suspect contrast in the setting of thiazide/loop diuretics + ACEi and metformin  - no obstructive issues on U/S, cysts noted on R kidney  - repeat labs today  - resume IVF   - daily labs and I/Os     CKD3a:  - unclear baseline, appears close to 1.5 to 1.7     Mild hyponatremia:  - suspect 2/2 thiazide use  - mild, cont to monitor    PE:  - on heparin drip    Acute CVA:  - appears embolic  - undergoing w/up per neuro     HTN  DM2  Obesity  KELSEY     Interval History:  Seen and examined.  Resting in bed.  Code CVA yesterday for L sided weakness.  MRI showing multiple infarcts, PE as well.  Appears better today.  Labs pending.  No cp, sob, n/v/d.  Ongoing weakness     Review of Systems: Pertinent items are noted in HPI.    Current Medications:   Current Facility-Administered Medications   Medication Dose Route Frequency    0.9 % sodium chloride infusion   IntraVENous Continuous    heparin (porcine) injection 10,000 Units  10,000 Units IntraVENous PRN    heparin (porcine) injection 5,000 Units  5,000 Units IntraVENous PRN    heparin 25,000 units in dextrose 5% 250 mL (premix) infusion  5-30 Units/kg/hr IntraVENous Continuous    sodium chloride flush 0.9 % injection 5-40 mL  5-40 mL IntraVENous 2 times per day    sodium chloride flush 0.9 % injection 5-40 mL  5-40 mL IntraVENous PRN    0.9 % sodium chloride infusion   IntraVENous PRN    ondansetron (ZOFRAN-ODT) disintegrating tablet 4 mg  4 mg Oral Q8H PRN    Or    ondansetron (ZOFRAN) injection 4 mg  4 mg IntraVENous Q6H PRN    polyethylene glycol (GLYCOLAX) packet 17 g  17 g Oral Daily  PRN    acetaminophen (TYLENOL) tablet 650 mg  650 mg Oral Q6H PRN    Or    acetaminophen (TYLENOL) suppository 650 mg  650 mg Rectal Q6H PRN    traZODone (DESYREL) tablet 50 mg  50 mg Oral 4x Daily PRN    [Held by provider] risperiDONE (RISPERDAL) tablet 1 mg  1 mg Oral BID    risperiDONE (RISPERDAL) tablet 1 mg  1 mg Oral BID PRN    hydrOXYzine HCl (ATARAX) tablet 50 mg  50 mg Oral 4x Daily PRN    OLANZapine (ZyPREXA) 5 mg in sterile water 1 mL injection  5 mg IntraVENous TID PRN    nitroGLYCERIN (NITROSTAT) SL tablet 0.4 mg  0.4 mg SubLINGual Once    [Held by provider] furosemide (LASIX) tablet 20 mg  20 mg Oral Daily    [Held by provider] metFORMIN (GLUCOPHAGE) tablet 500 mg  500 mg Oral BID WC    [Held by provider] lisinopril (PRINIVIL;ZESTRIL) tablet 20 mg  20 mg Oral Daily    [Held by provider] amLODIPine (NORVASC) tablet 10 mg  10 mg Oral Daily    [Held by provider] hydroCHLOROthiazide (HYDRODIURIL) tablet 12.5 mg  12.5 mg Oral Daily      No Known Allergies    Objective:  Vitals:    Vitals:    10/25/24 0356 10/25/24 0545 10/25/24 0815 10/25/24 1000   BP:   127/76    Pulse: 87 91 86 96   Resp:   18    Temp:   97.6 °F (36.4 °C)    TempSrc:   Oral    SpO2:   98%    Weight:       Height:         Intake and Output:  10/25 0701 - 10/25 1900  In: -   Out: 500 [Urine:500]  10/23 1901 - 10/25 0700  In: 354 [P.O.:354]  Out: 415 [Urine:415]    Physical Examination:  General: NAD,Conversant   Neck:  Supple, no mass  Resp:  Lungs CTA B/L, no wheezing , normal respiratory effort  CV:  RRR,  no murmur or rub, LE edema  GI:  Soft, NT, + BS, no HS megaly  Neurologic:  Non focal  Psych:             AAO x 3 appropriate affect   Skin:  No Rash       []    High complexity decision making was performed  []    Patient is at high-risk of decompensation with multiple organ involvement    Lab Data Personally Reviewed: I have reviewed all the pertinent labs, microbiology data and radiology studies during assessment.    Labs:  Recent

## 2024-10-25 NOTE — PLAN OF CARE
Problem: Risk for Elopement  Goal: Patient will not exit the unit/facility without proper excort  Outcome: Progressing   Patient remains with a sitter for safety due to altered mentation and bipolar.

## 2024-10-25 NOTE — PLAN OF CARE
Problem: Chronic Conditions and Co-morbidities  Goal: Patient's chronic conditions and co-morbidity symptoms are monitored and maintained or improved  Outcome: Progressing  Flowsheets (Taken 10/25/2024 0825)  Care Plan - Patient's Chronic Conditions and Co-Morbidity Symptoms are Monitored and Maintained or Improved: Monitor and assess patient's chronic conditions and comorbid symptoms for stability, deterioration, or improvement  Note: Patient is more awake and verbal today than yesterday.      Problem: Safety - Adult  Goal: Free from fall injury  Outcome: Progressing  Flowsheets (Taken 10/25/2024 0825)  Free From Fall Injury: Instruct family/caregiver on patient safety

## 2024-10-25 NOTE — CONSULTS
Systems:  Negative except for HPI     Objective:     Vitals:    10/25/24 0226 10/25/24 0356 10/25/24 0545 10/25/24 0815   BP: (!) 153/79   127/76   Pulse: 91 87 91 86   Resp: 16   18   Temp: 98.6 °F (37 °C)   97.6 °F (36.4 °C)   TempSrc: Oral   Oral   SpO2: 98%   98%   Weight:       Height:          Physical Exam:  Constitutional: Pleasant elderly male resting in the chair  Respiratory: Unlabored respirations       Neurologic Exam:  Mental Status:  AAOx4 following commands     Language:    Speech is hyper verbal, disruptive     Cranial Nerves:   EOMI, VFF, Face symmetric, Tongue midline        Motor:     Normal bulk and tone. Fine finger motor movements are intact. Strength 5/5 throughout.    Sensation:    Sensation intact throughout to light touch throughout     Coordination & Gait: FNF intact, gait deferred    Labs:  Lab Results   Component Value Date/Time    WBC 9.4 10/24/2024 06:11 PM    HGB 13.2 10/24/2024 06:11 PM    HCT 40.9 10/24/2024 06:11 PM     10/24/2024 06:11 PM    MCV 90.9 10/24/2024 06:11 PM      Lab Results   Component Value Date/Time     10/24/2024 06:18 AM    K 4.8 10/24/2024 06:18 AM     10/24/2024 06:18 AM    CO2 24 10/24/2024 06:18 AM    BUN 22 10/24/2024 06:18 AM     No results found for: \"CPK\", \"CKMB\", \"BNP\"    Imaging:    10/24/24 CT Head   IMPRESSION:  No acute intracranial abnormality.    10/24/24 CT Angio Head and Neck   CTA Head:  1. No evidence of significant stenosis or aneurysm.     CTA Neck:  1. No evidence of significant stenosis.  2. Acute segmental and subsegmental pulmonary emboli in the right upper lobe.  3. Multinodular goiter. Outpatient thyroid ultrasound is recommended for further  evaluation.    10/24/24 CT Perfusion   1. No acute abnormality.     10/24/24 MRI Brain WO Contrast   Scattered foci of punctate infarction right and left frontal lobe and left  occipital lobe with no associated hemorrhage, midline shift or mass effect.      Mild chronic  microvascular change and mild cerebral atrophy.  There is no intracranial mass, hemorrhage .   No additional acute intracranial process is demonstrated.    Assessment and Plan:    #Right and Left Frontal Lobe + Left Occipital Lobe Stroke   #Pulmonary Embolism     Patient is a 74 year old male w/pmh of  hyperlipidemia, diabetes, KELSEY, stroke, bipolar disorder who initially presented with chest pain, developed right sided weakness this admission on 10/24 for which a stroke code was called. CT Head unremarkable. CT Angio Head and Neck with no significant stenosis, right upper lobe pulmonary embolism noted.  MRI Brain notable for punctate stroke in different vascular territories.     Currently, neurological exam is non focal, no significant weakness noted.     Given stroke in different vascular territories, stroke etiology is likely cardioembolic.     Plan:   - Continue Heparin gtt for pulmonary embolism  - Hypercoagulable panel   - LDL 52.2, hemoglobin A1c pending. LDL goal <70, hemoglobin A1c goal <7 from a stroke prevention standpoint. Continue home Atorvastatin 40 mg, which is effectively managing LDL.   - No need for permissive hypertension   - Echo pending, if bubble study is negative, please order cardiac event monitor for patient   - PT/OT/ST  - Stroke education   - Patient will need to follow up with neurology in clinic, will follow hypercoagulable panel results. Message has been sent to scheduling team to arrange appointment.   - Neurology will sign off. Will follow echo results peripherally. Please call if needed.     Majo Guerrier NP   Neurology Nurse Practitioner     Patient discussed with attending physician Dr. Loredo

## 2024-10-26 ENCOUNTER — APPOINTMENT (OUTPATIENT)
Facility: HOSPITAL | Age: 74
End: 2024-10-26
Payer: MEDICARE

## 2024-10-26 LAB
ANION GAP SERPL CALC-SCNC: 8 MMOL/L (ref 2–12)
AT III PPP CHRO-ACNC: 107 % (ref 75–135)
B2 GLYCOPROT1 IGA SER-ACNC: <9 GPI IGA UNITS (ref 0–25)
B2 GLYCOPROT1 IGG SER-ACNC: <9 GPI IGG UNITS (ref 0–20)
B2 GLYCOPROT1 IGM SER-ACNC: <9 GPI IGM UNITS (ref 0–32)
BUN SERPL-MCNC: 21 MG/DL (ref 6–20)
BUN/CREAT SERPL: 21 (ref 12–20)
CALCIUM SERPL-MCNC: 9 MG/DL (ref 8.5–10.1)
CHLORIDE SERPL-SCNC: 101 MMOL/L (ref 97–108)
CO2 SERPL-SCNC: 26 MMOL/L (ref 21–32)
CREAT SERPL-MCNC: 1.02 MG/DL (ref 0.7–1.3)
ERYTHROCYTE [DISTWIDTH] IN BLOOD BY AUTOMATED COUNT: 13.1 % (ref 11.5–14.5)
GLUCOSE SERPL-MCNC: 105 MG/DL (ref 65–100)
HCT VFR BLD AUTO: 40.2 % (ref 36.6–50.3)
HGB BLD-MCNC: 12.9 G/DL (ref 12.1–17)
MCH RBC QN AUTO: 28.9 PG (ref 26–34)
MCHC RBC AUTO-ENTMCNC: 32.1 G/DL (ref 30–36.5)
MCV RBC AUTO: 90.1 FL (ref 80–99)
NRBC # BLD: 0 K/UL (ref 0–0.01)
NRBC BLD-RTO: 0 PER 100 WBC
PLATELET # BLD AUTO: 157 K/UL (ref 150–400)
PMV BLD AUTO: 11.7 FL (ref 8.9–12.9)
POTASSIUM SERPL-SCNC: 4.8 MMOL/L (ref 3.5–5.1)
PROT C PPP-ACNC: 101 % (ref 73–180)
PROT S ACT/NOR PPP: 52 % (ref 63–140)
RBC # BLD AUTO: 4.46 M/UL (ref 4.1–5.7)
SODIUM SERPL-SCNC: 135 MMOL/L (ref 136–145)
UFH PPP CHRO-ACNC: 0.27 IU/ML
UFH PPP CHRO-ACNC: 0.58 IU/ML
WBC # BLD AUTO: 7.8 K/UL (ref 4.1–11.1)

## 2024-10-26 PROCEDURE — 36415 COLL VENOUS BLD VENIPUNCTURE: CPT

## 2024-10-26 PROCEDURE — 6370000000 HC RX 637 (ALT 250 FOR IP): Performed by: PHYSICIAN ASSISTANT

## 2024-10-26 PROCEDURE — 85520 HEPARIN ASSAY: CPT

## 2024-10-26 PROCEDURE — 1100000000 HC RM PRIVATE

## 2024-10-26 PROCEDURE — 6370000000 HC RX 637 (ALT 250 FOR IP): Performed by: NURSE PRACTITIONER

## 2024-10-26 PROCEDURE — 71045 X-RAY EXAM CHEST 1 VIEW: CPT

## 2024-10-26 PROCEDURE — 6360000002 HC RX W HCPCS

## 2024-10-26 PROCEDURE — 85027 COMPLETE CBC AUTOMATED: CPT

## 2024-10-26 PROCEDURE — 84484 ASSAY OF TROPONIN QUANT: CPT

## 2024-10-26 PROCEDURE — 93005 ELECTROCARDIOGRAM TRACING: CPT | Performed by: NURSE PRACTITIONER

## 2024-10-26 PROCEDURE — 2580000003 HC RX 258: Performed by: PHYSICIAN ASSISTANT

## 2024-10-26 PROCEDURE — 2500000003 HC RX 250 WO HCPCS: Performed by: NURSE PRACTITIONER

## 2024-10-26 PROCEDURE — 80048 BASIC METABOLIC PNL TOTAL CA: CPT

## 2024-10-26 RX ORDER — ASPIRIN 81 MG/1
81 TABLET ORAL DAILY
Status: DISCONTINUED | OUTPATIENT
Start: 2024-10-26 | End: 2024-10-28

## 2024-10-26 RX ORDER — METOPROLOL TARTRATE 1 MG/ML
5 INJECTION, SOLUTION INTRAVENOUS ONCE
Status: COMPLETED | OUTPATIENT
Start: 2024-10-26 | End: 2024-10-26

## 2024-10-26 RX ORDER — ATORVASTATIN CALCIUM 40 MG/1
40 TABLET, FILM COATED ORAL EVERY EVENING
Status: DISCONTINUED | OUTPATIENT
Start: 2024-10-26 | End: 2024-10-31 | Stop reason: HOSPADM

## 2024-10-26 RX ADMIN — ATORVASTATIN CALCIUM 40 MG: 40 TABLET, FILM COATED ORAL at 19:06

## 2024-10-26 RX ADMIN — TRAZODONE HYDROCHLORIDE 50 MG: 50 TABLET ORAL at 21:11

## 2024-10-26 RX ADMIN — ACETAMINOPHEN 650 MG: 325 TABLET ORAL at 11:42

## 2024-10-26 RX ADMIN — METOPROLOL TARTRATE 5 MG: 1 INJECTION, SOLUTION INTRAVENOUS at 23:49

## 2024-10-26 RX ADMIN — SODIUM CHLORIDE, PRESERVATIVE FREE 10 ML: 5 INJECTION INTRAVENOUS at 10:40

## 2024-10-26 RX ADMIN — RISPERIDONE 1 MG: 1 TABLET, FILM COATED ORAL at 21:10

## 2024-10-26 RX ADMIN — SODIUM CHLORIDE, PRESERVATIVE FREE 10 ML: 5 INJECTION INTRAVENOUS at 21:11

## 2024-10-26 RX ADMIN — HEPARIN SODIUM 5000 UNITS: 1000 INJECTION INTRAVENOUS; SUBCUTANEOUS at 09:07

## 2024-10-26 RX ADMIN — ASPIRIN 81 MG: 81 TABLET, COATED ORAL at 11:38

## 2024-10-26 RX ADMIN — ACETAMINOPHEN 650 MG: 325 TABLET ORAL at 21:09

## 2024-10-26 ASSESSMENT — PAIN SCALES - WONG BAKER
WONGBAKER_NUMERICALRESPONSE: NO HURT
WONGBAKER_NUMERICALRESPONSE: NO HURT

## 2024-10-26 ASSESSMENT — PAIN SCALES - GENERAL
PAINLEVEL_OUTOF10: 3
PAINLEVEL_OUTOF10: 3
PAINLEVEL_OUTOF10: 0
PAINLEVEL_OUTOF10: 0

## 2024-10-26 NOTE — CONSULTS
[facility]  PSYCHIATRY CONSULT NOTE:    Name: Mani Estrella  MR#: [id number]  : 1950  ACCOUNT#: [id number]  ADMIT DATE: 10/19/2024    REASON FOR CONSULT: TDO bed hold    Interval update:  10/26/24- Mani is somewhat more organized in thought process today, calm during evaluation, states mood is OK, subsequently states \"moods are not to be messed with.\" States \"I feel like I need to be doing push-ups and sit-ups,\" and asks me if he can do that now. Denies any SI/HI, continues to endorse a noise in his head, just like yesterday he asks if it bothers me. States it sounds like a rumbling. Received PRN Atarax, decreased to 10mg, yesterday evening, as well as PRN trazodone.     10/25/24- Mani remains acutely disorganized. Stated his mood is \"OK.\" States he feels his mood has somewhat improved from recent days. States he used the bathroom and that made him feel better. States he's eating well without any appetite issues, then subsequently asks me if I'm also eating OK, which I assure him I am. States he's sleeping well. Denies SI/HI/AH/VH but subsequently asked if I'm bothered by the noise in his head, I inquired further about the noise in his head, he states it's deeper than talking, it's \"dorina,\" which he spells for me, then states it's his doctor, who plays tricks on his brain, then asks if I know Dr. Whitfield, and asks again if I can hear the noise in his brain.     10/24/2024  Mr. Estrella seen today while resting in bed comfortably and sitter at bedside.  I did get reports from case management and sitter initially that he was much more groggy today and not as alert and able to participate in his self-care like dating as he was yesterday.    During my interview with him today he was arousable readily by calling his nam and was able to communicate needs in a meaningful way to include wanting a sip of water and needing to change position.    Risperidone 1 mg twice daily was started 2 days ago which she

## 2024-10-26 NOTE — PROGRESS NOTES
ANNAMARIA resolving nicely  Cont to monitor off IVF  Daily labs over the weekend  Will check back Monday  Call with any issues

## 2024-10-26 NOTE — PROGRESS NOTES
Hospitalist Progress Note  MU Barrios NP  Answering service: [phone] OR [phone] from in house phone        Date of Service:  10/26/2024  NAME:  Mani Estrella  :  1950  MRN:  [id number]      Admission Summary:   Per H&P   Mani Estrella is a 74 y.o. male with a PMHx of HTN, HLD, T2DM, KELSEY, and CVA.      He initially presented to the ED on 10/19/2024 with complaints of chest pain. His workup at that time was unremarkable and he was to be discharged home however be began to act erratically, family confirmed he has a history of Bipolar disorder for which he has not been taking his medications. He was evaluated by [facility] and ultimately a TDO was obtained. While awaiting Psyhiatric admission, the patient's labs demonstrated acute renal insufficiency. Hospitalist was consulted for workup of this.      At bedside this morning the patient is somnolent and only minimally responsive to painful stimuli. RN reports the patient has just received a dose of IV ativan. Due to this history is primarily obtained via chart review.     Interval history / Subjective:   I saw the patient this morning on rounds.  No complaints the moment of the encounter     Assessment & Plan:      CVA   R sided weakness - improved  CT unremarkable  CTA no stenosis noted  MRI with right and left frontal lobe and left occipital lobe stroke  Continuing aspirin and statin  A1c 6.1  LDL 52   Has been seen by neurology, appreciate recommendations  Echocardiogram ordered, not yet done  Hypercoagulable panel sent  PT/OT           Acute renal insufficiency   Hyponatremia   Nephrology following, appreciate recommendations  ANNAMARIA and hyponatremia since resolved  Avoiding nephrotoxins                 Pulmonary Embolism   CTA with incidental finding of segmental and subsegmental PE right upper lobe  Also incidental finding of multinodular goiter  Outpatient

## 2024-10-26 NOTE — PLAN OF CARE
Problem: Safety - Adult  Goal: Free from fall injury  10/26/2024 1111 by Leatha Gonzalez LPN  Outcome: Progressing  10/26/2024 0057 by Arlette Sparks RN  Outcome: Progressing     Problem: Chronic Conditions and Co-morbidities  Goal: Patient's chronic conditions and co-morbidity symptoms are monitored and maintained or improved  10/26/2024 0057 by Arlette Sparks RN  Outcome: Progressing  Flowsheets (Taken 10/25/2024 2155)  Care Plan - Patient's Chronic Conditions and Co-Morbidity Symptoms are Monitored and Maintained or Improved: Monitor and assess patient's chronic conditions and comorbid symptoms for stability, deterioration, or improvement

## 2024-10-26 NOTE — PROGRESS NOTES
0920-latest Antifactor xa at 0.27. heparin 5000 units given bolus and increased dose by 2 units , pharmacy was contacted for verification

## 2024-10-26 NOTE — PLAN OF CARE
Problem: Safety - Adult  Goal: Free from fall injury  10/26/2024 0057 by Arlette Sparks, RN  Outcome: Progressing     Problem: Discharge Planning  Goal: Discharge to home or other facility with appropriate resources  Recent Flowsheet Documentation  Taken 10/25/2024 2155 by Arlette Sparks, RN  Discharge to home or other facility with appropriate resources: Identify barriers to discharge with patient and caregiver

## 2024-10-27 ENCOUNTER — APPOINTMENT (OUTPATIENT)
Facility: HOSPITAL | Age: 74
End: 2024-10-27
Payer: MEDICARE

## 2024-10-27 LAB
ALBUMIN SERPL-MCNC: 2.9 G/DL (ref 3.5–5)
ANION GAP SERPL CALC-SCNC: 5 MMOL/L (ref 2–12)
ANION GAP SERPL CALC-SCNC: 6 MMOL/L (ref 2–12)
BUN SERPL-MCNC: 21 MG/DL (ref 6–20)
BUN SERPL-MCNC: 22 MG/DL (ref 6–20)
BUN/CREAT SERPL: 20 (ref 12–20)
BUN/CREAT SERPL: 20 (ref 12–20)
CALCIUM SERPL-MCNC: 8.9 MG/DL (ref 8.5–10.1)
CALCIUM SERPL-MCNC: 9.2 MG/DL (ref 8.5–10.1)
CHLORIDE SERPL-SCNC: 100 MMOL/L (ref 97–108)
CHLORIDE SERPL-SCNC: 101 MMOL/L (ref 97–108)
CO2 SERPL-SCNC: 26 MMOL/L (ref 21–32)
CO2 SERPL-SCNC: 28 MMOL/L (ref 21–32)
CREAT SERPL-MCNC: 1.07 MG/DL (ref 0.7–1.3)
CREAT SERPL-MCNC: 1.09 MG/DL (ref 0.7–1.3)
ECHO AO ASC DIAM: 3 CM
ECHO AO ASCENDING AORTA INDEX: 1.15 CM/M2
ECHO AO ROOT DIAM: 3.3 CM
ECHO AO ROOT INDEX: 1.27 CM/M2
ECHO LA DIAMETER INDEX: 1.5 CM/M2
ECHO LA DIAMETER: 3.9 CM
ECHO LA TO AORTIC ROOT RATIO: 1.18
ECHO LA VOL A-L A4C: 19 ML (ref 18–58)
ECHO LA VOL MOD A4C: 18 ML (ref 18–58)
ECHO LA VOLUME INDEX A-L A4C: 7 ML/M2 (ref 16–34)
ECHO LA VOLUME INDEX MOD A4C: 7 ML/M2 (ref 16–34)
ECHO LV EDV A4C: 39 ML
ECHO LV EDV INDEX A4C: 15 ML/M2
ECHO LV EF PHYSICIAN: 60 %
ECHO LV EJECTION FRACTION A4C: 56 %
ECHO LV ESV A4C: 17 ML
ECHO LV ESV INDEX A4C: 7 ML/M2
ECHO LV INTERNAL DIMENSION DIASTOLE INDEX: 1.54 CM/M2
ECHO LV INTERNAL DIMENSION DIASTOLIC: 4 CM (ref 4.2–5.9)
ECHO LV IVSD: 1.5 CM (ref 0.6–1)
ECHO LV MASS 2D: 209 G (ref 88–224)
ECHO LV MASS INDEX 2D: 80.4 G/M2 (ref 49–115)
ECHO LV POSTERIOR WALL DIASTOLIC: 1.3 CM (ref 0.6–1)
ECHO LV RELATIVE WALL THICKNESS RATIO: 0.65
ECHO LVOT AREA: 3.1 CM2
ECHO LVOT DIAM: 2 CM
EKG ATRIAL RATE: 118 BPM
EKG DIAGNOSIS: NORMAL
EKG P-R INTERVAL: 154 MS
EKG Q-T INTERVAL: 318 MS
EKG QRS DURATION: 92 MS
EKG QTC CALCULATION (BAZETT): 445 MS
EKG R AXIS: -33 DEGREES
EKG T AXIS: 6 DEGREES
EKG VENTRICULAR RATE: 118 BPM
GLUCOSE SERPL-MCNC: 102 MG/DL (ref 65–100)
GLUCOSE SERPL-MCNC: 113 MG/DL (ref 65–100)
PHOSPHATE SERPL-MCNC: 2.9 MG/DL (ref 2.6–4.7)
POTASSIUM SERPL-SCNC: 4.1 MMOL/L (ref 3.5–5.1)
POTASSIUM SERPL-SCNC: 4.4 MMOL/L (ref 3.5–5.1)
SODIUM SERPL-SCNC: 133 MMOL/L (ref 136–145)
SODIUM SERPL-SCNC: 133 MMOL/L (ref 136–145)
TROPONIN I SERPL HS-MCNC: 18 NG/L (ref 0–76)
UFH PPP CHRO-ACNC: 0.63 IU/ML
UFH PPP CHRO-ACNC: 0.7 IU/ML

## 2024-10-27 PROCEDURE — 6370000000 HC RX 637 (ALT 250 FOR IP): Performed by: NURSE PRACTITIONER

## 2024-10-27 PROCEDURE — 93010 ELECTROCARDIOGRAM REPORT: CPT | Performed by: INTERNAL MEDICINE

## 2024-10-27 PROCEDURE — 6360000002 HC RX W HCPCS

## 2024-10-27 PROCEDURE — 2580000003 HC RX 258: Performed by: PHYSICIAN ASSISTANT

## 2024-10-27 PROCEDURE — 85520 HEPARIN ASSAY: CPT

## 2024-10-27 PROCEDURE — 93306 TTE W/DOPPLER COMPLETE: CPT

## 2024-10-27 PROCEDURE — 80069 RENAL FUNCTION PANEL: CPT

## 2024-10-27 PROCEDURE — 6370000000 HC RX 637 (ALT 250 FOR IP): Performed by: PHYSICIAN ASSISTANT

## 2024-10-27 PROCEDURE — 1100000000 HC RM PRIVATE

## 2024-10-27 PROCEDURE — 93308 TTE F-UP OR LMTD: CPT

## 2024-10-27 RX ORDER — LATANOPROST 50 UG/ML
1 SOLUTION/ DROPS OPHTHALMIC NIGHTLY
COMMUNITY

## 2024-10-27 RX ADMIN — HEPARIN SODIUM 15 UNITS/KG/HR: 10000 INJECTION, SOLUTION INTRAVENOUS at 19:46

## 2024-10-27 RX ADMIN — SODIUM CHLORIDE, PRESERVATIVE FREE 10 ML: 5 INJECTION INTRAVENOUS at 21:10

## 2024-10-27 RX ADMIN — ACETAMINOPHEN 650 MG: 325 TABLET ORAL at 16:09

## 2024-10-27 RX ADMIN — HEPARIN SODIUM 15 UNITS/KG/HR: 10000 INJECTION, SOLUTION INTRAVENOUS at 08:08

## 2024-10-27 RX ADMIN — POLYETHYLENE GLYCOL 3350 17 G: 17 POWDER, FOR SOLUTION ORAL at 16:09

## 2024-10-27 RX ADMIN — SODIUM CHLORIDE, PRESERVATIVE FREE 10 ML: 5 INJECTION INTRAVENOUS at 08:58

## 2024-10-27 RX ADMIN — RISPERIDONE 1 MG: 1 TABLET, FILM COATED ORAL at 21:10

## 2024-10-27 RX ADMIN — ASPIRIN 81 MG: 81 TABLET, COATED ORAL at 08:58

## 2024-10-27 RX ADMIN — ATORVASTATIN CALCIUM 40 MG: 40 TABLET, FILM COATED ORAL at 16:09

## 2024-10-27 ASSESSMENT — PAIN DESCRIPTION - LOCATION: LOCATION: BACK

## 2024-10-27 ASSESSMENT — PAIN SCALES - GENERAL: PAINLEVEL_OUTOF10: 0

## 2024-10-27 ASSESSMENT — PAIN DESCRIPTION - DESCRIPTORS: DESCRIPTORS: DISCOMFORT

## 2024-10-27 NOTE — PROGRESS NOTES
0245- U called again that patient was in A-flutter NP contacted again and he put in an order for cardiac consult .

## 2024-10-27 NOTE — PROGRESS NOTES
2202-U called to notify writer that this patient heart rate was  in 130's and in A-flutter. Prior to the call Patient was eating pudding and he started coughing up and threw up some , NP contacted Jose Luis Tan who ordered an EKG, Chest x-ray,Metoprolol 5 mg/5 ML via IV, labs BMP and Troponin level . Patient went back to SR after Metoprolol was given.

## 2024-10-27 NOTE — PROGRESS NOTES
Hospitalist Progress Note  MU Barrios NP  Answering service: [phone] OR [phone] from in house phone        Date of Service:  10/27/2024  NAME:  Mani Estrella  :  1950  MRN:  [id number]      Admission Summary:   Per H&P   Mani Estrella is a 74 y.o. male with a PMHx of HTN, HLD, T2DM, KELSEY, and CVA.      He initially presented to the ED on 10/19/2024 with complaints of chest pain. His workup at that time was unremarkable and he was to be discharged home however be began to act erratically, family confirmed he has a history of Bipolar disorder for which he has not been taking his medications. He was evaluated by [facility] and ultimately a TDO was obtained. While awaiting Psyhiatric admission, the patient's labs demonstrated acute renal insufficiency. Hospitalist was consulted for workup of this.      At bedside this morning the patient is somnolent and only minimally responsive to painful stimuli. RN reports the patient has just received a dose of IV ativan. Due to this history is primarily obtained via chart review.     Interval history / Subjective:     Overnight events noted  Wife and stepdaughter updated over the phone  I saw the patient this morning on rounds, no complaints the moment of the encounter       Assessment & Plan:      CVA   R sided weakness - improved  CT unremarkable  CTA no stenosis noted  MRI with right and left frontal lobe and left occipital lobe stroke  Possible cardioembolic source  Continuing aspirin and statin  A1c 6.1  LDL 52   Has been seen by neurology, appreciate recommendations  Echocardiogram today  Hypercoagulable panel sent  PT/OT    Atrial fibrillation/tachycardia  Again, echocardiogram has been ordered, not yet done  Known sinus rhythm  Heart rate controlled  He is on unfractionated heparin infusion  Cardiology has evaluated.  I did discuss the case with cardiology, does not

## 2024-10-27 NOTE — CONSULTS
S Cardiology Consult Note    Date of consult:  10/27/24  Date of admission: 10/19/2024  Primary Cardiologist: none  Physician Requesting consult: Hung Osorio    CC / Reason for consult: \"atrial fibrillation\"  Chief Complaint   Patient presents with    Chest Pain    Abdominal Pain        History of the presenting illness:  Mani Estrella is a 74 y.o. M who has been admitted with renal failure and hyponatremia initially.   He has bipolar disorder and has had issues with this during this admission and psychiatry is seeing him.    He has been on telemetry and there was some concern he may have had a.fib.   I reviewed his telemetry.  He is in NSR and has had some brief runs of narrow complex tachy, regular, and associated with P-waves, consistent with non-sustained runs of atrial tachycardia.   As far as I can tell, there are no rhythm strips or EKGs on file showing a.fib    He has a diagnosis of PE and is on anticoagulation.     He remains confused and is not able to give a history.     Past Medical History:   Diagnosis Date    Hypertension     Ill-defined condition     chronic lower back pain    Stroke (HCC)     april 2015       Prior to Admission medications    Medication Sig Start Date End Date Taking? Authorizing Provider   dilTIAZem HCl  MG TB24 Take 1 tablet by mouth at bedtime   Yes ProviderChristiano MD   glipiZIDE (GLUCOTROL) 5 MG tablet Take 1 tablet by mouth daily   Yes Provider, MD Christiano   hydrOXYzine HCl (ATARAX) 25 MG tablet Take 1 tablet by mouth at bedtime   Yes Provider, MD Christiano   acetaminophen (TYLENOL) 325 MG tablet Take by mouth every 4 hours as needed    Automatic Reconciliation, Ar   amLODIPine (NORVASC) 10 MG tablet Take by mouth daily  Patient not taking: Reported on 10/20/2024    Automatic Reconciliation, Ar   ARIPiprazole (ABILIFY) 15 MG tablet Take 1 tablet by mouth daily  Patient not taking: Reported on 10/20/2024    Automatic Reconciliation, Ar   aspirin 81 MG EC

## 2024-10-27 NOTE — PROGRESS NOTES
Notified by Nursing that patient was eating pudding earlier and suddenly started coughing and his heart rate went up to 130's.  When interviewing patient, he reported midsternal nonradiating chest pain.  Did attest to having afib before though there is none in his records.  Chart review showed aflutter interpretation of EKG rhythm strip at 2145 with NSR being documented at 1944.  Noted on telemetry record that patient had increase in HR to 130's with atrial flutter, converting at 2345 to NSR again.    Chest pain was both on left/right side of sternum, nonradiating, not reproducible with cough or palpation.  HR was in 110's, blood pressure stable, last temp was 98.6.    EKG showed sinus tachycardia  Labs showed Trop 18, Na 133, K 4.4, BUN 22 Cr. 1.09, glucose 113  Cxr showed No acute process on portable chest.   Metoprolol 5 mg iv x 1  Already on heparin gtt, therapeutic on last lab at 20:00 10/26    Upon re-examination, patient asleep, negative for chest pain

## 2024-10-28 LAB
ALBUMIN SERPL-MCNC: 3 G/DL (ref 3.5–5)
ANION GAP SERPL CALC-SCNC: 8 MMOL/L (ref 2–12)
BUN SERPL-MCNC: 16 MG/DL (ref 6–20)
BUN/CREAT SERPL: 16 (ref 12–20)
CALCIUM SERPL-MCNC: 9.1 MG/DL (ref 8.5–10.1)
CHLORIDE SERPL-SCNC: 103 MMOL/L (ref 97–108)
CO2 SERPL-SCNC: 25 MMOL/L (ref 21–32)
CREAT SERPL-MCNC: 0.98 MG/DL (ref 0.7–1.3)
ERYTHROCYTE [DISTWIDTH] IN BLOOD BY AUTOMATED COUNT: 12.8 % (ref 11.5–14.5)
GLUCOSE SERPL-MCNC: 116 MG/DL (ref 65–100)
HCT VFR BLD AUTO: 36.9 % (ref 36.6–50.3)
HGB BLD-MCNC: 11.9 G/DL (ref 12.1–17)
MCH RBC QN AUTO: 28.9 PG (ref 26–34)
MCHC RBC AUTO-ENTMCNC: 32.2 G/DL (ref 30–36.5)
MCV RBC AUTO: 89.6 FL (ref 80–99)
NRBC # BLD: 0 K/UL (ref 0–0.01)
NRBC BLD-RTO: 0 PER 100 WBC
PHOSPHATE SERPL-MCNC: 3.3 MG/DL (ref 2.6–4.7)
PLATELET # BLD AUTO: 206 K/UL (ref 150–400)
PMV BLD AUTO: 11.7 FL (ref 8.9–12.9)
POTASSIUM SERPL-SCNC: 4 MMOL/L (ref 3.5–5.1)
RBC # BLD AUTO: 4.12 M/UL (ref 4.1–5.7)
SODIUM SERPL-SCNC: 136 MMOL/L (ref 136–145)
UFH PPP CHRO-ACNC: 0.84 IU/ML
WBC # BLD AUTO: 5.8 K/UL (ref 4.1–11.1)

## 2024-10-28 PROCEDURE — 85027 COMPLETE CBC AUTOMATED: CPT

## 2024-10-28 PROCEDURE — 36415 COLL VENOUS BLD VENIPUNCTURE: CPT

## 2024-10-28 PROCEDURE — 6370000000 HC RX 637 (ALT 250 FOR IP): Performed by: NURSE PRACTITIONER

## 2024-10-28 PROCEDURE — 1100000000 HC RM PRIVATE

## 2024-10-28 PROCEDURE — 6370000000 HC RX 637 (ALT 250 FOR IP): Performed by: PHYSICIAN ASSISTANT

## 2024-10-28 PROCEDURE — 6360000002 HC RX W HCPCS

## 2024-10-28 PROCEDURE — 97530 THERAPEUTIC ACTIVITIES: CPT | Performed by: PHYSICAL THERAPIST

## 2024-10-28 PROCEDURE — 80069 RENAL FUNCTION PANEL: CPT

## 2024-10-28 PROCEDURE — 97530 THERAPEUTIC ACTIVITIES: CPT

## 2024-10-28 PROCEDURE — 2580000003 HC RX 258: Performed by: PHYSICIAN ASSISTANT

## 2024-10-28 PROCEDURE — 97116 GAIT TRAINING THERAPY: CPT | Performed by: PHYSICAL THERAPIST

## 2024-10-28 PROCEDURE — 85520 HEPARIN ASSAY: CPT

## 2024-10-28 RX ORDER — TIMOLOL MALEATE 5 MG/ML
1 SOLUTION/ DROPS OPHTHALMIC 2 TIMES DAILY
Status: DISCONTINUED | OUTPATIENT
Start: 2024-10-28 | End: 2024-10-31 | Stop reason: HOSPADM

## 2024-10-28 RX ORDER — LATANOPROST 50 UG/ML
1 SOLUTION/ DROPS OPHTHALMIC NIGHTLY
Status: DISCONTINUED | OUTPATIENT
Start: 2024-10-28 | End: 2024-10-31 | Stop reason: HOSPADM

## 2024-10-28 RX ADMIN — LATANOPROST 1 DROP: 50 SOLUTION OPHTHALMIC at 20:49

## 2024-10-28 RX ADMIN — TRAZODONE HYDROCHLORIDE 50 MG: 50 TABLET ORAL at 01:45

## 2024-10-28 RX ADMIN — ACETAMINOPHEN 650 MG: 325 TABLET ORAL at 16:24

## 2024-10-28 RX ADMIN — SODIUM CHLORIDE, PRESERVATIVE FREE 10 ML: 5 INJECTION INTRAVENOUS at 20:49

## 2024-10-28 RX ADMIN — APIXABAN 10 MG: 5 TABLET, FILM COATED ORAL at 23:25

## 2024-10-28 RX ADMIN — TIMOLOL MALEATE 1 DROP: 5 SOLUTION/ DROPS OPHTHALMIC at 20:49

## 2024-10-28 RX ADMIN — SODIUM CHLORIDE, PRESERVATIVE FREE 10 ML: 5 INJECTION INTRAVENOUS at 08:35

## 2024-10-28 RX ADMIN — ATORVASTATIN CALCIUM 40 MG: 40 TABLET, FILM COATED ORAL at 18:38

## 2024-10-28 RX ADMIN — HYDROXYZINE HYDROCHLORIDE 10 MG: 10 TABLET ORAL at 20:52

## 2024-10-28 RX ADMIN — RISPERIDONE 1 MG: 1 TABLET, FILM COATED ORAL at 20:49

## 2024-10-28 RX ADMIN — APIXABAN 10 MG: 5 TABLET, FILM COATED ORAL at 11:30

## 2024-10-28 RX ADMIN — ASPIRIN 81 MG: 81 TABLET, COATED ORAL at 08:35

## 2024-10-28 RX ADMIN — HEPARIN SODIUM 15 UNITS/KG/HR: 10000 INJECTION, SOLUTION INTRAVENOUS at 07:07

## 2024-10-28 RX ADMIN — ACETAMINOPHEN 650 MG: 325 TABLET ORAL at 01:45

## 2024-10-28 ASSESSMENT — PAIN SCALES - GENERAL
PAINLEVEL_OUTOF10: 3
PAINLEVEL_OUTOF10: 6
PAINLEVEL_OUTOF10: 4

## 2024-10-28 ASSESSMENT — PAIN DESCRIPTION - LOCATION: LOCATION: BACK

## 2024-10-28 ASSESSMENT — PAIN DESCRIPTION - DESCRIPTORS: DESCRIPTORS: ACHING;DISCOMFORT

## 2024-10-28 ASSESSMENT — PAIN DESCRIPTION - ORIENTATION: ORIENTATION: LOWER

## 2024-10-28 NOTE — CARE COORDINATION
JIM:    Anticipated d/c to rehab ([facility]) - Referral pending updated PT/OT notes    IPR recommended by attending and corroborated by therapist; SNF as backup.     CM met with Pt and updated him about process. He is on board for IPR and hopeful that he will be accepted.     --  Pt lives with his wife Aurora Groves who is at [facility]. Stepdajacob Castellanos is his primary contact.     Transition of Care Plan:    RUR: 16%  Prior Level of Functioning: home indep  Disposition: IPR, SNF as backup  SANDHYA: 10/29  If SNF or IPR: Date FOC offered: 10/25 with stepdau  Date FOC received: 10/25 with stepdau  Accepting facility:   Date authorization started with reference number:   Date authorization received and expires:   Follow up appointments:   DME needed:   Transportation at discharge:   /IMM Medicare/[facility] letter given: y  Is patient a  and connected with [facility]?    If yes, was [facility] transfer form completed and [facility] notified?   Caregiver Contact: jeffy Tobarjerrell Groves [phone]   Discharge Caregiver contacted prior to discharge?   Care Conference needed?   Barriers to discharge:  acceptance at [facility]; d/c plan

## 2024-10-28 NOTE — PLAN OF CARE
Problem: Occupational Therapy - Adult  Goal: By Discharge: Performs self-care activities at highest level of function for planned discharge setting.  See evaluation for individualized goals.  Description: FUNCTIONAL STATUS PRIOR TO ADMISSION: Patient is a limited historian (A&O x2-3), reports he is IND for ADLs/IADLs and mobility with no AD, lives with his wife who is currently receiving rehab at [facility]. Hx of bipolar dx with poor medication management, as well as CVA.     Occupational Therapy Goals:  Re-Evaluation 10/25/2024: Re-evaluation s/p code S with resulting + MRI for Right and Left Frontal Lobe + Left Occipital Lobe Stroke and incidental finding of PE.    Occupational Therapy Goals  Initiated 10/25/2024   1.  Patient will perform grooming standing at sink with Modified [place] within 7 day(s).  2.  Patient will perform lower body dressing with Moderate Assist within 7 day(s).  3.  Patient will perform toilet transfer with Minimal Assist within 7 day(s).  4.  Patient will perform all aspects of toileting with Moderate Assist within 7 day(s).  5.  Patient will participate in upper extremity therapeutic exercise/activities with Stand by Assist within 7 day(s).    6.  Patient will utilize energy conservation techniques during functional activities with verbal cues within 7 day(s).    Initiated 10/23/2024  1.  Patient will perform at least 2 grooming tasks in unsupported sitting with Minimal Assist within 7 day(s).  2.  Patient will perform seated bathing with Moderate Assist & AE PRN within 7 day(s).  3.  Patient will perform lower body dressing with Maximal Assist & AE PRN within 7 day(s).  4.  Patient will perform toilet transfers to/from [facility] with Maximal Assist x2 within 7 day(s).  5.  Patient will perform all aspects of toileting with Maximal Assist within 7 day(s).  6.  Patient will participate in upper extremity therapeutic exercise/activities with Moderate Assist for 5 minutes within 7  day(s).    7.  Patient will utilize energy conservation techniques during functional activities with verbal, visual, and tactile cues within 7 day(s).    Outcome: Progressing   OCCUPATIONAL THERAPY TREATMENT  Patient: Mani Estrella (74 y.o. male)  Date: 10/28/2024  Primary Diagnosis: ANNAMARIA (acute kidney injury) ([facility]) [N17.9]  Acute kidney injury (HCC) [N17.9]  Bipolar affective disorder, currently manic, severe, with psychotic features (HCC) [F31.2]  Non-traumatic rhabdomyolysis [M62.82]  Chest pain, unspecified type [R07.9]       Precautions: Fall Risk                Chart, occupational therapy assessment, plan of care, and goals were reviewed.    ASSESSMENT  Patient continues to benefit from skilled OT services and is progressing towards goals.  Nursing cleared for therapy and received with telesitter.  Oriented to self, place, date.  Tangential in conversation with questionable understanding of current situation and LOF.  He needed frequent verbal cues for redirections.  Received eating lunch and reports it tasted like bear and mule meat.  Supine > sit with mod Ax2 with good static sitting balance to complete ADL tasks.  Sit > stand with mod Ax2 with heavy pulling on RW needing x2 assist for RW stability, briefly able to release BUE from RW to complete hand hygiene.  He is below baseline for Adl tasks with impaired activity tolerance, standing balance, mobility, generalized strength and confusion.  Recommend continued skilled OT services.            PLAN :  Patient continues to benefit from skilled intervention to address the above impairments.  Continue treatment per established plan of care to address goals.    Recommend with staff: x2 assist with RW to BSC and to chair- do not leave unattended on BSC    Recommend next OT session: progress POC    Recommendation for discharge: (in order for the patient to meet his/her long term goals):   High intensity/comprehensive skilled occupational therapy in a

## 2024-10-28 NOTE — PLAN OF CARE
Problem: Safety - Adult  Goal: Free from fall injury  Outcome: Progressing     Problem: Chronic Conditions and Co-morbidities  Goal: Patient's chronic conditions and co-morbidity symptoms are monitored and maintained or improved  Outcome: Progressing     Problem: Discharge Planning  Goal: Discharge to home or other facility with appropriate resources  Outcome: Progressing     Problem: Pain  Goal: Verbalizes/displays adequate comfort level or baseline comfort level  Outcome: Progressing     Problem: Risk for Elopement  Goal: Patient will not exit the unit/facility without proper excort  Outcome: Progressing     Problem: Skin/Tissue Integrity  Goal: Absence of new skin breakdown  Description: 1.  Monitor for areas of redness and/or skin breakdown  2.  Assess vascular access sites hourly  3.  Every 4-6 hours minimum:  Change oxygen saturation probe site  4.  Every 4-6 hours:  If on nasal continuous positive airway pressure, respiratory therapy assess nares and determine need for appliance change or resting period.  Outcome: Progressing

## 2024-10-28 NOTE — PROGRESS NOTES
Hospitalist Progress Note  MU Barrios NP  Answering service: [phone] OR [phone] from in house phone        Date of Service:  10/28/2024  NAME:  Mani Estrella  :  1950  MRN:  [id number]      Admission Summary:   Per H&P   Mani Estrella is a 74 y.o. male with a PMHx of HTN, HLD, T2DM, KELSEY, and CVA.      He initially presented to the ED on 10/19/2024 with complaints of chest pain. His workup at that time was unremarkable and he was to be discharged home however be began to act erratically, family confirmed he has a history of Bipolar disorder for which he has not been taking his medications. He was evaluated by [facility] and ultimately a TDO was obtained. While awaiting Psyhiatric admission, the patient's labs demonstrated acute renal insufficiency. Hospitalist was consulted for workup of this.      At bedside this morning the patient is somnolent and only minimally responsive to painful stimuli. RN reports the patient has just received a dose of IV ativan. Due to this history is primarily obtained via chart review.     Interval history / Subjective:       I saw the patient this morning on rounds.  No complaints the moment of the encounter           Assessment & Plan:      CVA   R sided weakness - improved  CT unremarkable  CTA no stenosis noted  MRI with right and left frontal lobe and left occipital lobe stroke  Possible cardioembolic source  Continuing aspirin and statin  A1c 6.1  LDL 52   Has been seen by neurology, appreciate recommendations  Echocardiogram with no PFO  Hypercoagulable panel sent  PT/OT  I did discuss the case with neurology today, neurology recommending Holter cardiac monitor to determine if patient has paroxysmal atrial fibrillation as this will change treatment.  Patient is on anticoagulation for the PE for 3 months, depending on findings of thrombophilia workup.  With atrial  fibrillation patient will need lifelong anticoagulation.    Tachycardia  Now in sinus rhythm  Heart rate controlled  He is on unfractionated heparin infusion  Cardiology has evaluated.  I did discuss the case with cardiology, does not look like atrial fibrillation on telemetry.       Acute renal insufficiency   Hyponatremia   Nephrology following, appreciate recommendations  ANNAMARIA and hyponatremia since resolved  Avoiding nephrotoxins                 Pulmonary Embolism   CTA with incidental finding of segmental and subsegmental PE right upper lobe  Also incidental finding of multinodular goiter  Outpatient thyroid ultrasound  Hypercoagulable workup pending  Currently on heparin infusion  Will transition to apixaban today.  I did discuss the case with Pharm.D., will discontinue heparin infusion and do loading dose of apixaban           Bipolar disorder  Psychiatry following, appreciate recommendations  Continuing risperidone     T2DM  A1c 6.1  SSI  Holding oral hypoglycemics       HTN  Blood pressure controlled without medications  Holding lisinopril  for now         HLD  LDL 52  Restarting his home atorvastatin 40 mg                  Code status: Full  Prophylaxis: Apixaban  Care Plan discussed with: Patient, nurse, , neurology  Anticipated Disposition: Likely discharge early this week to rehab     Principal Problem:    Acute kidney injury (HCC)  Resolved Problems:    * No resolved hospital problems. *            Review of Systems:   Pertinent items are noted in HPI.         Vital Signs:    Last 24hrs VS reviewed since prior progress note. Most recent are:  /86   Pulse 88   Temp 98.2 °F (36.8 °C) (Axillary)   Resp 18   Ht 1.854 m (6' 1\")   Wt (!) 142.7 kg (314 lb 8 oz)   SpO2 97%   BMI 41.49 kg/m²      No intake or output data in the 24 hours ending 10/28/24 0813       Physical Examination:     I had a face to face encounter with this patient and independently examined them on 10/28/2024 as

## 2024-10-28 NOTE — PLAN OF CARE
Problem: Physical Therapy - Adult  Goal: By Discharge: Performs mobility at highest level of function for planned discharge setting.  See evaluation for individualized goals.  Description: FUNCTIONAL STATUS PRIOR TO ADMISSION: Patient was independent and active without use of DME.    HOME SUPPORT PRIOR TO ADMISSION: The patient lived with wife.  Wife has been in SNF for rehab for the past month.    Physical Therapy Goals  Initiated 10/23/2024  1.  Patient will move from supine to sit and sit to supine in bed with minimal assistance within 7 day(s).    2.  Patient will perform sit to stand with maximal assistance within 7 day(s).  3.  Patient will transfer from bed to chair and chair to bed with maximal assistance using the least restrictive device within 7 day(s).  4.  Patient will ambulate approx 5 feet with modA x 2 within 7 days.  Outcome: Progressing   PHYSICAL THERAPY TREATMENT    Patient: Mani Estrella (74 y.o. male)  Date: 10/28/2024  Diagnosis: ANNAMARIA (acute kidney injury) (HCC) [N17.9]  Acute kidney injury (HCC) [N17.9]  Bipolar affective disorder, currently manic, severe, with psychotic features (HCC) [F31.2]  Non-traumatic rhabdomyolysis [M62.82]  Chest pain, unspecified type [R07.9] Acute kidney injury (HCC)      Precautions: Fall Risk                      ASSESSMENT:  Patient continues to benefit from skilled PT services and is progressing towards goals. Patient overall limited by pain, impaired balance, gait instability, generalized weakness and decreased activity tolerance.  He continues to be confused and tangential with conversation.  Requires cues to stay on task.  Overall needing modA x 2 to come to sit on the EOB.  Sit to stand with modA x 2 and pulls to stand on the RW.  Able to take a few steps to the chair with RW and Gracia x 2.  Patient continues to be significantly below his functional baseline.  May benefit from high intensity rehab to progress patient to more independent level of function.  apparent distress sitting up in chair, Call bell within reach, and Bed/ chair alarm activated      COMMUNICATION/EDUCATION:   The patient's plan of care was discussed with: physical therapist, occupational therapist, and registered nurse           Lisa Ludwig, PT  Minutes: 24

## 2024-10-29 LAB
ALBUMIN SERPL-MCNC: 3.3 G/DL (ref 3.5–5)
ANION GAP SERPL CALC-SCNC: 4 MMOL/L (ref 2–12)
BUN SERPL-MCNC: 15 MG/DL (ref 6–20)
BUN/CREAT SERPL: 13 (ref 12–20)
CALCIUM SERPL-MCNC: 9.9 MG/DL (ref 8.5–10.1)
CHLORIDE SERPL-SCNC: 101 MMOL/L (ref 97–108)
CO2 SERPL-SCNC: 29 MMOL/L (ref 21–32)
COMMENT:: NORMAL
CREAT SERPL-MCNC: 1.15 MG/DL (ref 0.7–1.3)
GLUCOSE SERPL-MCNC: 98 MG/DL (ref 65–100)
PHOSPHATE SERPL-MCNC: 2.7 MG/DL (ref 2.6–4.7)
POTASSIUM SERPL-SCNC: 4 MMOL/L (ref 3.5–5.1)
SODIUM SERPL-SCNC: 134 MMOL/L (ref 136–145)
SPECIMEN HOLD: NORMAL

## 2024-10-29 PROCEDURE — 6370000000 HC RX 637 (ALT 250 FOR IP): Performed by: NURSE PRACTITIONER

## 2024-10-29 PROCEDURE — 2580000003 HC RX 258: Performed by: PHYSICIAN ASSISTANT

## 2024-10-29 PROCEDURE — 80069 RENAL FUNCTION PANEL: CPT

## 2024-10-29 PROCEDURE — 36415 COLL VENOUS BLD VENIPUNCTURE: CPT

## 2024-10-29 PROCEDURE — 1100000000 HC RM PRIVATE

## 2024-10-29 PROCEDURE — 6370000000 HC RX 637 (ALT 250 FOR IP): Performed by: PHYSICIAN ASSISTANT

## 2024-10-29 RX ORDER — LISINOPRIL 20 MG/1
40 TABLET ORAL DAILY
Status: DISCONTINUED | OUTPATIENT
Start: 2024-10-29 | End: 2024-10-31 | Stop reason: HOSPADM

## 2024-10-29 RX ORDER — DILTIAZEM HYDROCHLORIDE 300 MG/1
300 CAPSULE, COATED, EXTENDED RELEASE ORAL NIGHTLY
Status: DISCONTINUED | OUTPATIENT
Start: 2024-10-29 | End: 2024-10-31 | Stop reason: HOSPADM

## 2024-10-29 RX ADMIN — LISINOPRIL 40 MG: 20 TABLET ORAL at 12:38

## 2024-10-29 RX ADMIN — TRAZODONE HYDROCHLORIDE 50 MG: 50 TABLET ORAL at 02:19

## 2024-10-29 RX ADMIN — POLYETHYLENE GLYCOL 3350 17 G: 17 POWDER, FOR SOLUTION ORAL at 09:22

## 2024-10-29 RX ADMIN — TIMOLOL MALEATE 1 DROP: 5 SOLUTION/ DROPS OPHTHALMIC at 21:51

## 2024-10-29 RX ADMIN — APIXABAN 10 MG: 5 TABLET, FILM COATED ORAL at 21:57

## 2024-10-29 RX ADMIN — SODIUM CHLORIDE, PRESERVATIVE FREE 10 ML: 5 INJECTION INTRAVENOUS at 21:51

## 2024-10-29 RX ADMIN — DILTIAZEM HYDROCHLORIDE 300 MG: 300 CAPSULE, COATED, EXTENDED RELEASE ORAL at 21:51

## 2024-10-29 RX ADMIN — SODIUM CHLORIDE, PRESERVATIVE FREE 10 ML: 5 INJECTION INTRAVENOUS at 08:32

## 2024-10-29 RX ADMIN — APIXABAN 10 MG: 5 TABLET, FILM COATED ORAL at 10:32

## 2024-10-29 RX ADMIN — LATANOPROST 1 DROP: 50 SOLUTION OPHTHALMIC at 21:51

## 2024-10-29 RX ADMIN — RISPERIDONE 1 MG: 1 TABLET, FILM COATED ORAL at 21:51

## 2024-10-29 RX ADMIN — ATORVASTATIN CALCIUM 40 MG: 40 TABLET, FILM COATED ORAL at 17:52

## 2024-10-29 RX ADMIN — TIMOLOL MALEATE 1 DROP: 5 SOLUTION/ DROPS OPHTHALMIC at 08:32

## 2024-10-29 NOTE — CONSULTS
[facility]  PSYCHIATRY CONSULT NOTE:    Name: Mani Estrella  MR#: [id number]  : 1950  ACCOUNT#: [id number]  ADMIT DATE: 10/19/2024    REASON FOR CONSULT: TDO bed hold    Interval update:    10/27/24  Mani is much improved from my last several encounters with him, and he surprisingly remembered me from our first visit in the emergency department.  Mr. Estrella is being considered for discharge within the next day or 2 and psychiatry was asked to assess and evaluate him for psychiatric clearance.    He was getting dressed when I came in,  alert and attentive,  more so than I have seen him previously. Engaged with me right away when I walked in, without the anxiety/agitation or confusion he has had on some previous visits intermittently.    He does feel like medications prescribed for mental health, risperidone 1 mg at night, has been helpful and denies any apparent bothersome side effects.    I administered a  Mini-Mental exam after explaining the purpose of clearance for him to be discharged safely.  He scored a 30, perfect score, appropriately answering all questions and in a brisk manner and on the first try.    He denies to me having any more of the racing thoughts or \"jumble and racing thoughts\" at this time nor over the last 24 hrs, to that, I am not certain.    Psychiatrically he is appropriate, attentive, articulate and organized with his communication, but still does have periods of some hypo-hyper , pressured and run on speech. I am better able to redirect him today than previous meetings.    Otherwise he denies any new or bothersome psychiatric symptoms and denies any thoughts of suicide, homicide, or having any audio or visual hallucinations, paranoia, or delusional thinking.    10/26/24- Mani is somewhat more organized in thought process today, calm during evaluation, states mood is OK, subsequently states \"moods are not to be messed with.\" States \"I feel like I need to be doing push-ups and

## 2024-10-29 NOTE — TELEPHONE ENCOUNTER
Called all three numbers on patients chart, phone number ending in [phone]- no answer and [facility] is full.  The other two numbers are not accepting call at the moment.

## 2024-10-29 NOTE — CARE COORDINATION
JIM:    Possible d/c to rehab ([facility]) - Referral pending  Per [facility], prior to going to [facility]  1) Sitter would need to discharged for 24 hours   2) Psych would need to clear     [facility] liaison will meet with CM on 10/30 about d/c plans, PLOF, and requirements for acceptance. CM requested that [facility] also meet with Pt and talk to stepdau.     CM notified bedside nurse that PT's stepdau Jasmin Groves requests to know if Pt's eye drops have been provided to him. Apparently weekend nurse was helping/to help family with obtaining eye drops. Jasmin requests a call regarding the eye drops.  [phone]     CM talked with Patient about a \"plan B\" in case [facility] does not accept. He states that he refuses all other facilities and that he insists on discharge home and that his stepson Bellafonte will assist him there.     CM called emergency contact Jasmin regarding plan B as well. She agrees with clinical team that discharge home is not a safe plan. She is similarly focused on getting into [facility] but was willing to engage in a conversation about secondary facility choices. CM emailed SNF list to Jasmin at [email or web address] and requested that she review with family about and provide CM with top 3 choices as backup.     Jasmin states that she uses video/conf calls to help her sibling and parents communicate about care plans given that Pt's wife is at [facility] and siblings live around [facility].     --  Pt lives with his wife Aurora Groves who is at [facility]. Stepdaughter Jasmin is his primary contact.     Transition of Care Plan:    RUR: 15%  Prior Level of Functioning: home indep  Disposition: IPR, SNF as backup  SANDHYA: 10/29  If SNF or IPR: Date FOC offered: 10/25 with stepdau  Date FOC received: 10/25 with stepdau  Accepting facility:   Date authorization started with reference number:   Date authorization received and expires:   Follow up appointments:   DME needed:   Transportation at discharge: w/c van  IM/IMM Medicare/

## 2024-10-29 NOTE — PROGRESS NOTES
Spiritual Health History and Assessment/Progress Note  [facility]    Initial Encounter,  ,  ,      Name: Mani Estrella MRN: [id number]    Age: 74 y.o.     Sex: male   Language: English   Zoroastrianism: Buddhist   Acute kidney injury (HCC)     Date: 10/29/2024            Total Time Calculated: 30 min              Spiritual Assessment began in [facility]        Referral/Consult From: Other (comment) ( Consult)   Encounter Overview/Reason: Initial Encounter  Service Provided For: Patient    Raquel, Belief, Meaning:   Patient is connected with a raquel tradition or spiritual practice  Family/Friends No family/friends present      Importance and Influence:  Patient has spiritual/personal beliefs that influence decisions regarding their health  Family/Friends No family/friends present    Community:  Patient is connected with a spiritual community and feels well-supported. Support system includes: Spouse/Partner and Children  Family/Friends No family/friends present    Assessment and Plan of Care:     Patient Interventions include: Facilitated expression of thoughts and feelings, Explored spiritual coping/struggle/distress, Affirmed coping skills/support systems, and Other: Initiated a relationship of care/trust  Family/Friends Interventions include: No family/friends present    Patient Plan of Care: No spiritual needs identified for follow-up and Spiritual Care available upon further referral  Family/Friends Plan of Care: No family/friends present    Electronically signed by AUTUMN Ba on 10/29/2024 at 3:14 PM  For additional spiritual care, please contact the  on-call at ([phone]).    Antonia Spangler MDiv, MS, BCC  Staff   Spiritual Health Services

## 2024-10-29 NOTE — PLAN OF CARE
Problem: Safety - Adult  Goal: Free from fall injury  10/29/2024 0901 by Jazmin Ortiz RN  Outcome: Progressing     Problem: Chronic Conditions and Co-morbidities  Goal: Patient's chronic conditions and co-morbidity symptoms are monitored and maintained or improved  10/29/2024 0901 by Jazmin Ortiz RN  Outcome: Progressing     Problem: Discharge Planning  Goal: Discharge to home or other facility with appropriate resources  10/29/2024 0901 by Jazmin Ortiz RN  Outcome: Progressing     Problem: Pain  Goal: Verbalizes/displays adequate comfort level or baseline comfort level  10/29/2024 0901 by Jazmin Ortiz RN  Outcome: Progressing     Problem: Skin/Tissue Integrity  Goal: Absence of new skin breakdown  Description: 1.  Monitor for areas of redness and/or skin breakdown  2.  Assess vascular access sites hourly  3.  Every 4-6 hours minimum:  Change oxygen saturation probe site  4.  Every 4-6 hours:  If on nasal continuous positive airway pressure, respiratory therapy assess nares and determine need for appliance change or resting period.  10/29/2024 0901 by Jazmin Ortiz RN  Outcome: Progressing

## 2024-10-29 NOTE — PROGRESS NOTES
Hospitalist Progress Note  MU Chaparro CNP  Answering service: [phone] OR [phone] from in house phone        Date of Service:  10/29/2024  NAME:  Mani Estrella  :  1950  MRN:  [id number]      Admission Summary:   Per H&P   Mani Estrella is a 74 y.o. male with a PMHx of HTN, HLD, T2DM, KELSEY, and CVA.      He initially presented to the ED on 10/19/2024 with complaints of chest pain. His workup at that time was unremarkable and he was to be discharged home however be began to act erratically, family confirmed he has a history of Bipolar disorder for which he has not been taking his medications. He was evaluated by [facility] and ultimately a TDO was obtained. While awaiting Psyhiatric admission, the patient's labs demonstrated acute renal insufficiency. Hospitalist was consulted for workup of this.      At bedside this morning the patient is somnolent and only minimally responsive to painful stimuli. RN reports the patient has just received a dose of IV ativan. Due to this history is primarily obtained via chart review.     Interval history / Subjective:   10/29 seen and examined on rounds, very talkative and informative, denies fever chest pain shortness of breath, no n/v/d       Assessment & Plan:      CVA   R sided weakness - improved  CT unremarkable  CTA no stenosis noted  MRI with right and left frontal lobe and left occipital lobe stroke  Possible cardioembolic source  Continuing aspirin and statin  A1c 6.1  LDL 52   Has been seen by neurology, appreciate recommendations  Echocardiogram with no PFO  Hypercoagulable panel sent  PT/OT  I did discuss the case with neurology today, neurology recommending Holter cardiac monitor to determine if patient has paroxysmal atrial fibrillation as this will change treatment.  Patient is on anticoagulation for the PE for 3 months, depending on findings of

## 2024-10-29 NOTE — PROGRESS NOTES
Physical Therapy  Attempted PT session earlier this morning but patient is on commode for prolonged time.  Upon return later in afternoon patient is adamant that he is not interested in walking around at all and prefers to take a nap.  States he will be agreeable if PT returns in the \"bright sunshiny morning\".  Lisa Ludwig, PT, DPT

## 2024-10-29 NOTE — [facility] NOTE
[facility] Liaison Team Note     LOS:  7     Patient goals for today:  take medications as prescribed, make needs known in an appropriate manner.  [facility] Liaison team focus/goals: assess MH needs, provide therapeutic support, brief therapy, and education, as needed.  Assist medical care management team with recommendations for coordination of care.    Progress note:  Per triage, Pt arrives via EMS visiting wife at [facility], pt c/o nausea chest pain. EMS arrived and performed EKG showing abnormal rhythm, , 87 HR, 97 ra, feeling \"distended and RL abdominal pain\".  Pt is Aox4 in no apparent distress. Denies SOB or current pain.        Liaison met with pt, FTF, on the medical floor with sitter at bedside.  Pt reports his mood is \"great\" and states \"God is good\".  Pt reports his grandson is coming to the hospital today to bring him a pair of \"casual\" shoes to wear when he discharges.  Pt indicates his wife is doing better and should be getting a boot today for her ankle.  He states she has been complaining every times he talks with her, and jokingly says she has \"Cristina-itis\" - referring to Cristina in the bible.  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  Pt shares his Baptism beliefs and love of [place] and hints at wanting to do a sermon on the medical floor before he leaves.  Liaison provided therapeutic support and encouragement, conferred with pt's Enedina PAYTON, and will continue to monitor and support, as needed.       Barriers to Discharge: IPR DC plan  Guns in the home:  no     Outpatient provider(s):  none reported  Insurance info/prescription coverage:  medicare     Diagnosis: hx of bipolar with psychotic features     Plan:  Please refer to most recent psychiatric consult note and medical team for recommendations and disposition.      Follow up Psych Consult placed? Yes .   Psychiatrist updated? No      Participating treatment team members: Mani Estrella, Natalie Childers,

## 2024-10-29 NOTE — PROGRESS NOTES
Occupational Therapy  10/29/24    Chart reviewed in prep for OT. Pt presented seated on window seat with sitter present. Pt adamant about not participating in therapy at this time and his plans to take a nap. Pt requesting therapist return tomorrow AM. Will defer and follow up as able and appropriate tomorrow.     Thank you!  Nessa Pham, OT

## 2024-10-29 NOTE — PLAN OF CARE
Problem: Safety - Adult  Goal: Free from fall injury  10/28/2024 2056 by Katie Rogers RN  Outcome: Progressing  10/28/2024 1106 by Maty Mendez RN  Outcome: Progressing     Problem: Chronic Conditions and Co-morbidities  Goal: Patient's chronic conditions and co-morbidity symptoms are monitored and maintained or improved  10/28/2024 2056 by Katie Rogers RN  Outcome: Progressing  10/28/2024 1106 by Maty Mendez RN  Outcome: Progressing  Flowsheets (Taken 10/28/2024 0840)  Care Plan - Patient's Chronic Conditions and Co-Morbidity Symptoms are Monitored and Maintained or Improved: Monitor and assess patient's chronic conditions and comorbid symptoms for stability, deterioration, or improvement     Problem: Discharge Planning  Goal: Discharge to home or other facility with appropriate resources  10/28/2024 2056 by Katie Rogers RN  Outcome: Progressing  10/28/2024 1106 by Maty Mendez RN  Outcome: Progressing  Flowsheets (Taken 10/28/2024 0840)  Discharge to home or other facility with appropriate resources: Identify barriers to discharge with patient and caregiver     Problem: Pain  Goal: Verbalizes/displays adequate comfort level or baseline comfort level  10/28/2024 2056 by Katie Rogers RN  Outcome: Progressing  Flowsheets (Taken 10/28/2024 1600 by Maty Mendez RN)  Verbalizes/displays adequate comfort level or baseline comfort level: Encourage patient to monitor pain and request assistance  10/28/2024 1106 by Maty Mendez RN  Outcome: Progressing  Flowsheets (Taken 10/28/2024 0840)  Verbalizes/displays adequate comfort level or baseline comfort level: Encourage patient to monitor pain and request assistance     Problem: Risk for Elopement  Goal: Patient will not exit the unit/facility without proper excort  10/28/2024 2056 by Katie Rogers RN  Outcome: Progressing  Flowsheets (Taken 10/28/2024 2055)  Nursing Interventions for Elopement Risk: Assist with personal care needs such as toileting,  Maty RN  Outcome: Progressing

## 2024-10-30 LAB
ANION GAP SERPL CALC-SCNC: 3 MMOL/L (ref 2–12)
BASOPHILS # BLD: 0.1 K/UL (ref 0–0.1)
BASOPHILS NFR BLD: 1 % (ref 0–1)
BUN SERPL-MCNC: 17 MG/DL (ref 6–20)
BUN/CREAT SERPL: 13 (ref 12–20)
CALCIUM SERPL-MCNC: 9.7 MG/DL (ref 8.5–10.1)
CHLORIDE SERPL-SCNC: 105 MMOL/L (ref 97–108)
CO2 SERPL-SCNC: 26 MMOL/L (ref 21–32)
CREAT SERPL-MCNC: 1.29 MG/DL (ref 0.7–1.3)
DIFFERENTIAL METHOD BLD: ABNORMAL
EOSINOPHIL # BLD: 0.3 K/UL (ref 0–0.4)
EOSINOPHIL NFR BLD: 3 % (ref 0–7)
ERYTHROCYTE [DISTWIDTH] IN BLOOD BY AUTOMATED COUNT: 12.8 % (ref 11.5–14.5)
GLUCOSE SERPL-MCNC: 129 MG/DL (ref 65–100)
HCT VFR BLD AUTO: 37 % (ref 36.6–50.3)
HGB BLD-MCNC: 12.2 G/DL (ref 12.1–17)
IMM GRANULOCYTES # BLD AUTO: 0.1 K/UL (ref 0–0.04)
IMM GRANULOCYTES NFR BLD AUTO: 1 % (ref 0–0.5)
LYMPHOCYTES # BLD: 2 K/UL (ref 0.8–3.5)
LYMPHOCYTES NFR BLD: 24 % (ref 12–49)
MCH RBC QN AUTO: 29.3 PG (ref 26–34)
MCHC RBC AUTO-ENTMCNC: 33 G/DL (ref 30–36.5)
MCV RBC AUTO: 88.9 FL (ref 80–99)
MONOCYTES # BLD: 1 K/UL (ref 0–1)
MONOCYTES NFR BLD: 11 % (ref 5–13)
NEUTS SEG # BLD: 5.2 K/UL (ref 1.8–8)
NEUTS SEG NFR BLD: 60 % (ref 32–75)
NRBC # BLD: 0 K/UL (ref 0–0.01)
NRBC BLD-RTO: 0 PER 100 WBC
PLATELET # BLD AUTO: 266 K/UL (ref 150–400)
PMV BLD AUTO: 10.9 FL (ref 8.9–12.9)
POTASSIUM SERPL-SCNC: 4.7 MMOL/L (ref 3.5–5.1)
RBC # BLD AUTO: 4.16 M/UL (ref 4.1–5.7)
SODIUM SERPL-SCNC: 134 MMOL/L (ref 136–145)
WBC # BLD AUTO: 8.7 K/UL (ref 4.1–11.1)

## 2024-10-30 PROCEDURE — 1100000000 HC RM PRIVATE

## 2024-10-30 PROCEDURE — 6370000000 HC RX 637 (ALT 250 FOR IP): Performed by: NURSE PRACTITIONER

## 2024-10-30 PROCEDURE — 36415 COLL VENOUS BLD VENIPUNCTURE: CPT

## 2024-10-30 PROCEDURE — 2580000003 HC RX 258: Performed by: PHYSICIAN ASSISTANT

## 2024-10-30 PROCEDURE — 97535 SELF CARE MNGMENT TRAINING: CPT

## 2024-10-30 PROCEDURE — 80048 BASIC METABOLIC PNL TOTAL CA: CPT

## 2024-10-30 PROCEDURE — 97116 GAIT TRAINING THERAPY: CPT

## 2024-10-30 PROCEDURE — 97530 THERAPEUTIC ACTIVITIES: CPT

## 2024-10-30 PROCEDURE — 85025 COMPLETE CBC W/AUTO DIFF WBC: CPT

## 2024-10-30 PROCEDURE — 6370000000 HC RX 637 (ALT 250 FOR IP): Performed by: PHYSICIAN ASSISTANT

## 2024-10-30 RX ADMIN — SODIUM CHLORIDE, PRESERVATIVE FREE 10 ML: 5 INJECTION INTRAVENOUS at 21:53

## 2024-10-30 RX ADMIN — RISPERIDONE 1 MG: 1 TABLET, FILM COATED ORAL at 20:23

## 2024-10-30 RX ADMIN — TIMOLOL MALEATE 1 DROP: 5 SOLUTION/ DROPS OPHTHALMIC at 08:48

## 2024-10-30 RX ADMIN — TIMOLOL MALEATE 1 DROP: 5 SOLUTION/ DROPS OPHTHALMIC at 20:29

## 2024-10-30 RX ADMIN — DILTIAZEM HYDROCHLORIDE 300 MG: 300 CAPSULE, COATED, EXTENDED RELEASE ORAL at 20:23

## 2024-10-30 RX ADMIN — ATORVASTATIN CALCIUM 40 MG: 40 TABLET, FILM COATED ORAL at 17:01

## 2024-10-30 RX ADMIN — LISINOPRIL 40 MG: 20 TABLET ORAL at 08:48

## 2024-10-30 RX ADMIN — APIXABAN 10 MG: 5 TABLET, FILM COATED ORAL at 20:28

## 2024-10-30 RX ADMIN — APIXABAN 10 MG: 5 TABLET, FILM COATED ORAL at 10:54

## 2024-10-30 RX ADMIN — TRAZODONE HYDROCHLORIDE 50 MG: 50 TABLET ORAL at 22:32

## 2024-10-30 RX ADMIN — ACETAMINOPHEN 650 MG: 325 TABLET ORAL at 22:31

## 2024-10-30 RX ADMIN — LATANOPROST 1 DROP: 50 SOLUTION OPHTHALMIC at 20:29

## 2024-10-30 RX ADMIN — HYDROCHLOROTHIAZIDE 12.5 MG: 25 TABLET ORAL at 08:48

## 2024-10-30 ASSESSMENT — PAIN SCALES - GENERAL: PAINLEVEL_OUTOF10: 0

## 2024-10-30 NOTE — PROGRESS NOTES
Hospitalist Progress Note  MU Chaparro CNP  Answering service: [phone] OR [phone] from in house phone        Date of Service:  10/30/2024  NAME:  Mani Estrella  :  1950  MRN:  [id number]      Admission Summary:   Per H&P   Mani Estrella is a 74 y.o. male with a PMHx of HTN, HLD, T2DM, KELSEY, and CVA.      He initially presented to the ED on 10/19/2024 with complaints of chest pain. His workup at that time was unremarkable and he was to be discharged home however be began to act erratically, family confirmed he has a history of Bipolar disorder for which he has not been taking his medications. He was evaluated by [facility] and ultimately a TDO was obtained. While awaiting Psyhiatric admission, the patient's labs demonstrated acute renal insufficiency. Hospitalist was consulted for workup of this.      At bedside this morning the patient is somnolent and only minimally responsive to painful stimuli. RN reports the patient has just received a dose of IV ativan. Due to this history is primarily obtained via chart review.     Interval history / Subjective:   10/29 seen and examined on rounds, very talkative and informative, denies fever chest pain shortness of breath, no n/v/d       Assessment & Plan:      CVA   R sided weakness - improved  CT unremarkable  CTA no stenosis noted  MRI with right and left frontal lobe and left occipital lobe stroke  Possible cardioembolic source  Continuing aspirin and statin  A1c 6.1  LDL 52   Has been seen by neurology, appreciate recommendations  Echocardiogram with no PFO  Hypercoagulable panel sent  PT/OT  neurology recommending Holter cardiac monitor to determine if patient has paroxysmal atrial fibrillation as this will change treatment.  Patient is on anticoagulation for the PE for 3 months, depending on findings of thrombophilia workup.  With atrial fibrillation patient  lisinopril (PRINIVIL;ZESTRIL) tablet 40 mg  40 mg Oral Daily    apixaban (ELIQUIS) tablet 10 mg  10 mg Oral BID    Followed by    [START ON 11/4/2024] apixaban (ELIQUIS) tablet 5 mg  5 mg Oral BID    latanoprost (XALATAN) 0.005 % ophthalmic solution 1 drop  1 drop Both Eyes Nightly    timolol (TIMOPTIC) 0.5 % ophthalmic solution 1 drop  1 drop Both Eyes BID    atorvastatin (LIPITOR) tablet 40 mg  40 mg Oral QPM    risperiDONE (RISPERDAL) tablet 1 mg  1 mg Oral Nightly    hydrOXYzine HCl (ATARAX) tablet 10 mg  10 mg Oral TID PRN    haloperidol (HALDOL) tablet 2 mg  2 mg Oral Q6H PRN    Or    haloperidol lactate (HALDOL) injection 2 mg  2 mg IntraVENous Q6H PRN    sodium chloride flush 0.9 % injection 5-40 mL  5-40 mL IntraVENous 2 times per day    sodium chloride flush 0.9 % injection 5-40 mL  5-40 mL IntraVENous PRN    0.9 % sodium chloride infusion   IntraVENous PRN    ondansetron (ZOFRAN-ODT) disintegrating tablet 4 mg  4 mg Oral Q8H PRN    Or    ondansetron (ZOFRAN) injection 4 mg  4 mg IntraVENous Q6H PRN    polyethylene glycol (GLYCOLAX) packet 17 g  17 g Oral Daily PRN    acetaminophen (TYLENOL) tablet 650 mg  650 mg Oral Q6H PRN    Or    acetaminophen (TYLENOL) suppository 650 mg  650 mg Rectal Q6H PRN    traZODone (DESYREL) tablet 50 mg  50 mg Oral 4x Daily PRN    nitroGLYCERIN (NITROSTAT) SL tablet 0.4 mg  0.4 mg SubLINGual Once    [Held by provider] furosemide (LASIX) tablet 20 mg  20 mg Oral Daily    [Held by provider] metFORMIN (GLUCOPHAGE) tablet 500 mg  500 mg Oral BID WC    hydroCHLOROthiazide (HYDRODIURIL) tablet 12.5 mg  12.5 mg Oral Daily     ______________________________________________________________________  EXPECTED LENGTH OF STAY: Unable to retrieve estimated LOS  ACTUAL LENGTH OF STAY:          8                 Jose Luis Tan, APRN - CNP

## 2024-10-30 NOTE — PLAN OF CARE
Problem: Occupational Therapy - Adult  Goal: By Discharge: Performs self-care activities at highest level of function for planned discharge setting.  See evaluation for individualized goals.  Description: FUNCTIONAL STATUS PRIOR TO ADMISSION: Patient is a limited historian (A&O x2-3), reports he is IND for ADLs/IADLs and mobility with no AD, lives with his wife who is currently receiving rehab at [facility]. Hx of bipolar dx with poor medication management, as well as CVA.     Occupational Therapy Goals:  Re-Evaluation 10/25/2024: Re-evaluation s/p code S with resulting + MRI for Right and Left Frontal Lobe + Left Occipital Lobe Stroke and incidental finding of PE.    Occupational Therapy Goals  Weekly Re-Assessment 10/30: Goals reviewed and updated.  Initiated 10/25/2024   1.  Patient will perform grooming standing at sink with Modified [place] within 7 day(s). CONTINUE  2.  Patient will perform lower body dressing with Moderate Assist within 7 day(s). MET UPGRADE to S/U  3.  Patient will perform toilet transfer with Minimal Assist within 7 day(s). MET UPGRADE to Spv  4.  Patient will perform all aspects of toileting with Moderate Assist within 7 day(s). MET UPGRADE to MOD I  5.  Patient will participate in upper extremity therapeutic exercise/activities with Stand by Assist within 7 day(s).  CONTINUE  6.  Patient will utilize energy conservation techniques during functional activities with verbal cues within 7 day(s). CONTINUE    Initiated 10/23/2024  1.  Patient will perform at least 2 grooming tasks in unsupported sitting with Minimal Assist within 7 day(s).  2.  Patient will perform seated bathing with Moderate Assist & AE PRN within 7 day(s).  3.  Patient will perform lower body dressing with Maximal Assist & AE PRN within 7 day(s).  4.  Patient will perform toilet transfers to/from [facility] with Maximal Assist x2 within 7 day(s).  5.  Patient will perform all aspects of toileting with Maximal Assist

## 2024-10-30 NOTE — CARE COORDINATION
JIM:     Pt has been accepted at [facility], pending bed availability. No bed today; earliest possible bed 10/31 or later.    [facility] liaison Deisy Watkins [phone] visited Pt in room today to do an assessment; patient accepted for [facility]    CM updated Pt about acceptance     PT's jeffy Groves: [phone]     --  Pt lives with his wife Aurora Groves who is at [facility]. Stepdaughter Jasmin is his primary contact.     Transition of Care Plan:    RUR: 13%  Prior Level of Functioning: home indep  Disposition: IPR, SNF as backup  SANDHYA: 10/29  If SNF or IPR: Date FOC offered: 10/25 with stepdau  Date FOC received: 10/25 with stepdau  Accepting facility:   Date authorization started with reference number:   Date authorization received and expires:   Follow up appointments:   DME needed:   Transportation at discharge: w/c van  IM/IMM Medicare/ letter given: y  Is patient a [place] and connected with [facility]?    If yes, was [place] transfer form completed and [facility] notified?   Caregiver Contact: jeffy Jasmin Groves [phone]   Discharge Caregiver contacted prior to discharge?   Care Conference needed?   Barriers to discharge:  acceptance at IPR and/or SNF

## 2024-10-30 NOTE — PROGRESS NOTES
Comprehensive Nutrition Assessment    Type and Reason for Visit: Initial, RD Nutrition Re-Screen/LOS    Nutrition Recommendations/Plan:   Continue current diet  Obtain updated weight  Please record %PO intake in I/Os     Malnutrition Assessment:  Malnutrition Status:  No malnutrition (10/30/24 1450)    Context:  Acute Illness        Nutrition Assessment:    73 yo male admitted for ANNAMARIA, presented with chest pain, N/V. PMH of HTN, T2DM, CVA. Initially presented to ED 10/19 for chest pain, was discharged home but began to act erratically. Pt has hx of bipolar disorder and has not been taking his medications.     10/30: RD evaluation for LOS. Code stroke called 10/24 with +CVA on MRI, also found PE on CTA head/neck. ANNAMARIA resolved, nephrology signed off. Psychiatry following for AMS/bipolar d/o, has improved greatly since admission. Pt accepted to IPR.  Spoke with pt at bedside, [facility] liaison there as well. Per psychiatry, pt has some periods of hypo-hyper pressured and run on speech. During assessment, pt often went off on tangents and it was difficult to re-direct the conversation. Was able to gather that he has been eating % of his meals with a good appetite, consistent with flowsheet documentation. He says he \"eats what he likes and he eats enough\". PTA he had no recent changes to his intake/appetite. When discussed weight, pt was adamant about being weighed at that moment. Obtained standing scale and weighed patient (134 kg // 295#). Wrote this down for pt per his request. He has not been weighed on this admission, most recent wt is from 10/20 and is 314#, likely high d/t bed scale weight method. Unable to obtain UBW. There is a weight from 10/16 of 310# per chart review, however, unsure of weight method. RD to continue to monitor while IP.    Nutritionally Significant Medications:  HCTZ    Estimated Daily Nutrient Needs:  Energy Requirements Based On: Formula  Weight Used for Energy Requirements: Ideal  Energy  (kcal/day): 3378-4517 (MSJ x 1.2-1.4 IBW)  Weight Used for Protein Requirements: Ideal  Protein (g/day): 109-126 (1.3-1.5 g/kg IBW)  Method Used for Fluid Requirements: 1 ml/kcal  Fluid (ml/day): 2186-3448 ml    Nutrition Related Findings:   Edema: Right lower extremity, Left lower extremity            RLE Edema: Trace  LLE Edema: Trace    Recent Labs     10/28/24  0641 10/29/24  0703 10/30/24  0647   GLUCOSE 116* 98 129*   BUN 16 15 17   CREATININE 0.98 1.15 1.29    134* 134*   K 4.0 4.0 4.7    101 105   CO2 25 29 26   CALCIUM 9.1 9.9 9.7   PHOS 3.3 2.7  --      Lab Results   Component Value Date/Time    LABA1C 6.1 10/24/2024 06:11 PM     10/24/2024 06:11 PM     Triglycerides   Date Value Ref Range Status   10/25/2024 94 <150 MG/DL Final     Comment:     Based on NCEP-ATP III:  Triglycerides <150 mg/dL  is considered normal, 150-199 mg/dL  borderline high,  200-499 mg/dL high and  greater than or equal to 500 mg/dL very high.     Last BM: 10/30/24    Wounds:   Wound Type: None    Current Nutrition Therapies:  Diet: regular  Supplements: none  Meal Intake:   Patient Vitals for the past 168 hrs:   PO Meals Eaten (%)   10/30/24 0845 51 - 75%   10/26/24 1200 76 - 100%   10/26/24 0837 51 - 75%   10/24/24 0925 1 - 25%     Supplement Intake:  Patient Vitals for the past 168 hrs:   PO Supplement (%)   10/30/24 0845 51 - 75%     Nutrition Support: none    Anthropometric Measures:  Height: 185.4 cm (6' 1\")  Ideal Body Weight (IBW): 184 lbs (84 kg)       Current Body Weight: 142.7 kg (314 lb 9.5 oz), 171 % IBW. Weight Source: Bed Scale  Current BMI (kg/m2): 41.5        Weight Adjustment For: No Adjustment                 BMI Categories: Obese Class 3 (BMI 40.0 or greater)    Wt Readings from Last 10 Encounters:   10/20/24 (!) 142.7 kg (314 lb 8 oz)   09/13/21 133.8 kg (295 lb)   03/13/19 (!) 137 kg (302 lb)     Nutrition Diagnosis:   No nutrition diagnosis at this time     Nutrition Interventions:   Food

## 2024-10-30 NOTE — PLAN OF CARE
Problem: Physical Therapy - Adult  Goal: By Discharge: Performs mobility at highest level of function for planned discharge setting.  See evaluation for individualized goals.  Description: FUNCTIONAL STATUS PRIOR TO ADMISSION: Patient was independent and active without use of DME.    HOME SUPPORT PRIOR TO ADMISSION: The patient lived with wife.  Wife has been in SNF for rehab for the past month.  Physical Therapy Goals  Revised 10/30/2024  1.  Patient will move from supine to sit and sit to supine in bed with supervision/set-up within 7 day(s).    2.  Patient will perform sit to stand with supervision/set-up within 7 day(s).  3.  Patient will transfer from bed to chair and chair to bed with supervision/set-up using the least restrictive device within 7 day(s).  4.  Patient will ambulate with contact guard assistance for 300 feet with the least restrictive device within 7 day(s).   5.  Patient will ascend/descend 3 stairs with one handrail(s) with minimal assistance within 7 day(s).    Initiated 10/23/2024  1.  Patient will move from supine to sit and sit to supine in bed with minimal assistance within 7 day(s).    2.  Patient will perform sit to stand with maximal assistance within 7 day(s).  3.  Patient will transfer from bed to chair and chair to bed with maximal assistance using the least restrictive device within 7 day(s).  4.  Patient will ambulate approx 5 feet with modA x 2 within 7 days.  Outcome: Progressing   PHYSICAL THERAPY TREATMENT: WEEKLY REASSESSMENT    Patient: Mani Estrella (74 y.o. male)  Date: 10/30/2024  Primary Diagnosis: ANNAMARIA (acute kidney injury) (HCC) [N17.9]  Acute kidney injury (HCC) [N17.9]  Bipolar affective disorder, currently manic, severe, with psychotic features (HCC) [F31.2]  Non-traumatic rhabdomyolysis [M62.82]  Chest pain, unspecified type [R07.9]       Precautions: Fall Risk                      ASSESSMENT :  Patient continues to benefit from skilled PT services and is  progressing towards goals. Pt continues to present with decreased activity tolerance, balance deficits, and weakness. Patient received in chair performing ADLs. Pt eager to work with therapy, reports using RW earlier. Performed gait training without RW noting significant lateral trunk sway and intermittently reaching out for wall support. Pt requires moderate cues for safety with fair carryover. Provided intermittent min A while pt ambulating to prevent falling. Anticipate need for supervision upon discharge as pt with poor safety awareness. Pt presents as a great candidate for intensive therapy to address impairments and maximize functional outcomes.     Patient's progression toward goals since last assessment: ambulating x CGA/min A    Functional Outcome Measure:  The patient scored 17 on the [facility] outcome measure which is indicative of Cutoff score <=171,2,3 had higher odds of discharging home with home health or need of SNF/IPR.          PLAN :  Goals have been updated based on progression since last assessment.  Patient continues to benefit from skilled intervention to address the above impairments.    Recommendations and Planned Interventions:   bed mobility training, transfer training, gait training, therapeutic exercises, neuromuscular re-education, patient and family training/education, and therapeutic activities      Frequency/Duration: Patient will be followed by physical therapy to address goals, PT Plan of Care: 5 times/week  per day/week to address goals.    Recommend with staff: therapy recommendations for staff: Recommend mobility with staff assist x1 using gait belt and rolling walker.      Recommendation for discharge: (in order for the patient to meet his/her long term goals):   High intensity/comprehensive skilled physical therapy in a multidisciplinary setting as patient is working towards tolerating up to 3 hours of therapy/day 5-7x/week    Other factors to consider for discharge: available

## 2024-10-30 NOTE — PLAN OF CARE
Problem: Safety - Adult  Goal: Free from fall injury  10/30/2024 0919 by Rachel Chung RN  Outcome: Progressing  10/30/2024 0825 by Katie Rogers RN  Outcome: Progressing     Problem: Chronic Conditions and Co-morbidities  Goal: Patient's chronic conditions and co-morbidity symptoms are monitored and maintained or improved  10/30/2024 0919 by Rachel Chung RN  Outcome: Progressing  10/30/2024 0825 by Katie Rogers RN  Outcome: Progressing     Problem: Discharge Planning  Goal: Discharge to home or other facility with appropriate resources  10/30/2024 0919 by Rachel Chung RN  Outcome: Progressing  10/30/2024 0825 by Katie Rogers RN  Outcome: Progressing     Problem: Pain  Goal: Verbalizes/displays adequate comfort level or baseline comfort level  10/30/2024 0919 by Rachel Chung RN  Outcome: Progressing  10/30/2024 0825 by Katie Rogers RN  Outcome: Progressing     Problem: Risk for Elopement  Goal: Patient will not exit the unit/facility without proper excort  10/30/2024 0919 by Rachel Chung RN  Outcome: Progressing  Flowsheets (Taken 10/30/2024 0845)  Nursing Interventions for Elopement Risk: Assist with personal care needs such as toileting, eating, dressing, as needed to reduce the risk of wandering  10/30/2024 0825 by Katie Rogers RN  Outcome: Progressing     Problem: Skin/Tissue Integrity  Goal: Absence of new skin breakdown  Description: 1.  Monitor for areas of redness and/or skin breakdown  2.  Assess vascular access sites hourly  3.  Every 4-6 hours minimum:  Change oxygen saturation probe site  4.  Every 4-6 hours:  If on nasal continuous positive airway pressure, respiratory therapy assess nares and determine need for appliance change or resting period.  10/30/2024 0919 by Rachel Chung RN  Outcome: Progressing  10/30/2024 0825 by Katie Rogers RN  Outcome: Progressing

## 2024-10-31 VITALS
OXYGEN SATURATION: 97 % | HEIGHT: 73 IN | SYSTOLIC BLOOD PRESSURE: 102 MMHG | DIASTOLIC BLOOD PRESSURE: 64 MMHG | WEIGHT: 314.5 LBS | BODY MASS INDEX: 41.68 KG/M2 | TEMPERATURE: 98.1 F | RESPIRATION RATE: 16 BRPM | HEART RATE: 80 BPM

## 2024-10-31 LAB
ALBUMIN SERPL-MCNC: 3.5 G/DL (ref 3.5–5)
ALBUMIN/GLOB SERPL: 0.8 (ref 1.1–2.2)
ALP SERPL-CCNC: 78 U/L (ref 45–117)
ALT SERPL-CCNC: 100 U/L (ref 12–78)
ANION GAP SERPL CALC-SCNC: 6 MMOL/L (ref 2–12)
ANION GAP SERPL CALC-SCNC: 6 MMOL/L (ref 2–12)
APTT HEX PL PPP: 2 SEC
APTT IMM NP PPP: 30.4 SEC
APTT PPP 1:1 SALINE: 43.4 SEC
APTT PPP: 33.6 SEC
AST SERPL-CCNC: 66 U/L (ref 15–37)
B2 GLYCOPROT1 IGA SER-ACNC: <10 SAU
B2 GLYCOPROT1 IGG SER-ACNC: <10 SGU
B2 GLYCOPROT1 IGM SER-ACNC: 11 SMU
BILIRUB SERPL-MCNC: 0.5 MG/DL (ref 0.2–1)
BUN SERPL-MCNC: 23 MG/DL (ref 6–20)
BUN SERPL-MCNC: 23 MG/DL (ref 6–20)
BUN/CREAT SERPL: 14 (ref 12–20)
BUN/CREAT SERPL: 14 (ref 12–20)
CALCIUM SERPL-MCNC: 10 MG/DL (ref 8.5–10.1)
CALCIUM SERPL-MCNC: 9.6 MG/DL (ref 8.5–10.1)
CARDIOLIPIN IGG SER IA-ACNC: <10 GPL
CARDIOLIPIN IGM SER IA-ACNC: <10 MPL
CHLORIDE SERPL-SCNC: 101 MMOL/L (ref 97–108)
CHLORIDE SERPL-SCNC: 102 MMOL/L (ref 97–108)
CO2 SERPL-SCNC: 24 MMOL/L (ref 21–32)
CO2 SERPL-SCNC: 25 MMOL/L (ref 21–32)
COMMENT:: NORMAL
CONFIRM DRVVT: ABNORMAL SEC
CREAT SERPL-MCNC: 1.65 MG/DL (ref 0.7–1.3)
CREAT SERPL-MCNC: 1.67 MG/DL (ref 0.7–1.3)
GLOBULIN SER CALC-MCNC: 4.2 G/DL (ref 2–4)
GLUCOSE SERPL-MCNC: 114 MG/DL (ref 65–100)
GLUCOSE SERPL-MCNC: 120 MG/DL (ref 65–100)
INR PPP: 1.1 RATIO
LABORATORY COMMENT REPORT: ABNORMAL
POTASSIUM SERPL-SCNC: 4.6 MMOL/L (ref 3.5–5.1)
POTASSIUM SERPL-SCNC: 4.7 MMOL/L (ref 3.5–5.1)
PROT SERPL-MCNC: 7.7 G/DL (ref 6.4–8.2)
PROTHROMBIN TIME: 11.8 SEC
SCREEN DRVVT/NORMAL: ABNORMAL RATIO
SCREEN DRVVT: 45.8 SEC
SODIUM SERPL-SCNC: 132 MMOL/L (ref 136–145)
SODIUM SERPL-SCNC: 132 MMOL/L (ref 136–145)
SPECIMEN HOLD: NORMAL
THROMBIN TIME: 21.4 SEC

## 2024-10-31 PROCEDURE — 80053 COMPREHEN METABOLIC PANEL: CPT

## 2024-10-31 PROCEDURE — 6370000000 HC RX 637 (ALT 250 FOR IP): Performed by: NURSE PRACTITIONER

## 2024-10-31 PROCEDURE — 36415 COLL VENOUS BLD VENIPUNCTURE: CPT

## 2024-10-31 RX ORDER — BUSPIRONE HYDROCHLORIDE 5 MG/1
5 TABLET ORAL 2 TIMES DAILY
Qty: 60 TABLET | Refills: 2 | Status: SHIPPED | OUTPATIENT
Start: 2024-10-31 | End: 2025-01-29

## 2024-10-31 RX ORDER — TRAZODONE HYDROCHLORIDE 50 MG/1
50 TABLET, FILM COATED ORAL 4 TIMES DAILY PRN
Qty: 10 TABLET | Refills: 0 | Status: SHIPPED | OUTPATIENT
Start: 2024-10-31 | End: 2024-11-10

## 2024-10-31 RX ORDER — HYDROCHLOROTHIAZIDE 12.5 MG/1
12.5 TABLET ORAL DAILY
Qty: 30 TABLET | Refills: 3 | Status: SHIPPED | OUTPATIENT
Start: 2024-11-01

## 2024-10-31 RX ORDER — RISPERIDONE 1 MG/1
1 TABLET ORAL NIGHTLY
Qty: 60 TABLET | Refills: 3 | Status: SHIPPED | OUTPATIENT
Start: 2024-10-31

## 2024-10-31 RX ADMIN — APIXABAN 10 MG: 5 TABLET, FILM COATED ORAL at 11:14

## 2024-10-31 RX ADMIN — TIMOLOL MALEATE 1 DROP: 5 SOLUTION/ DROPS OPHTHALMIC at 08:53

## 2024-10-31 ASSESSMENT — PAIN SCALES - GENERAL
PAINLEVEL_OUTOF10: 0

## 2024-10-31 NOTE — PROGRESS NOTES
Pt bed alarm and chair alarm are activated. Pt attempting to get out of bed without assistance. RN educated pt to call before getting out of bed. Pt appears to be deactivating bed alarm himself. Pt education provided, RN told pt that his bed alarm is for his safety and he cannot unplug it.

## 2024-10-31 NOTE — PLAN OF CARE
Problem: Safety - Adult  Goal: Free from fall injury  10/31/2024 1047 by Bridgette Longo RN  Outcome: Adequate for Discharge  10/31/2024 1045 by Bridgette Longo RN  Outcome: Progressing     Problem: Chronic Conditions and Co-morbidities  Goal: Patient's chronic conditions and co-morbidity symptoms are monitored and maintained or improved  10/31/2024 1047 by Bridgette Longo RN  Outcome: Adequate for Discharge  10/31/2024 1045 by Bridgette Longo RN  Outcome: Progressing     Problem: Discharge Planning  Goal: Discharge to home or other facility with appropriate resources  10/31/2024 1047 by Bridgette Longo RN  Outcome: Adequate for Discharge  10/31/2024 1045 by Bridgette Longo RN  Outcome: Progressing     Problem: Pain  Goal: Verbalizes/displays adequate comfort level or baseline comfort level  10/31/2024 1047 by Bridgette Longo RN  Outcome: Adequate for Discharge  10/31/2024 1045 by Bridgette Longo RN  Outcome: Progressing     Problem: Risk for Elopement  Goal: Patient will not exit the unit/facility without proper excort  10/31/2024 1047 by Bridgette Longo RN  Outcome: Adequate for Discharge  10/31/2024 1045 by Bridgette Longo RN  Outcome: Progressing     Problem: Skin/Tissue Integrity  Goal: Absence of new skin breakdown  Description: 1.  Monitor for areas of redness and/or skin breakdown  2.  Assess vascular access sites hourly  3.  Every 4-6 hours minimum:  Change oxygen saturation probe site  4.  Every 4-6 hours:  If on nasal continuous positive airway pressure, respiratory therapy assess nares and determine need for appliance change or resting period.  10/31/2024 1047 by Bridgette Longo RN  Outcome: Adequate for Discharge  10/31/2024 1045 by Bridgette Logno RN  Outcome: Progressing     Problem: ABCDS Injury Assessment  Goal: Absence of physical injury  10/31/2024 1047 by Bridgette Longo RN  Outcome: Adequate for Discharge  10/31/2024 1045 by Bridgette Longo

## 2024-10-31 NOTE — PROGRESS NOTES
[facility] Nurse said that she would call this RN back

## 2024-10-31 NOTE — PLAN OF CARE
Problem: Safety - Adult  Goal: Free from fall injury  Outcome: Progressing     Problem: Chronic Conditions and Co-morbidities  Goal: Patient's chronic conditions and co-morbidity symptoms are monitored and maintained or improved  Outcome: Progressing     Problem: Discharge Planning  Goal: Discharge to home or other facility with appropriate resources  Outcome: Progressing     Problem: Pain  Goal: Verbalizes/displays adequate comfort level or baseline comfort level  Outcome: Progressing     Problem: Risk for Elopement  Goal: Patient will not exit the unit/facility without proper excort  Outcome: Progressing     Problem: Skin/Tissue Integrity  Goal: Absence of new skin breakdown  Description: 1.  Monitor for areas of redness and/or skin breakdown  2.  Assess vascular access sites hourly  3.  Every 4-6 hours minimum:  Change oxygen saturation probe site  4.  Every 4-6 hours:  If on nasal continuous positive airway pressure, respiratory therapy assess nares and determine need for appliance change or resting period.  Outcome: Progressing     Problem: ABCDS Injury Assessment  Goal: Absence of physical injury  Outcome: Progressing

## 2024-10-31 NOTE — DISCHARGE SUMMARY
Discharge Summary       PATIENT ID: Mani Estrella  MRN: [id number]   YOB: 1950    DATE OF ADMISSION: 10/19/2024  8:05 AM    DATE OF DISCHARGE: 10/31/24     PRIMARY CARE PROVIDER: Dionne Mcrae APRN - NP     ATTENDING PHYSICIAN: Dr. Angelina Roberts  DISCHARGING PROVIDER: MU Chaparro CNP    To contact this individual call [phone] and ask the  to page.  If unavailable ask to be transferred the Adult Hospitalist Department.    CONSULTATIONS: IP CONSULT TO CASE MANAGEMENT  IP CONSULT TO BSMART  IP CONSULT TO PSYCHIATRY  IP CONSULT TO INTERNAL MEDICINE  IP CONSULT TO PSYCHIATRY  IP CONSULT TO NEPHROLOGY  IP CONSULT TO PSYCHIATRY  IP CONSULT TO PSYCHIATRY  IP CONSULT TO TELE-NEUROLOGY  IP CONSULT TO NEUROLOGY  IP CONSULT TO PSYCHIATRY  IP CONSULT TO CARDIOLOGY  IP CONSULT TO PSYCHIATRY    PROCEDURES/SURGERIES: * No surgery found *     ADMITTING DIAGNOSES & HOSPITAL COURSE:    Mani Estrella is a 74 y.o. male with a PMHx of HTN, HLD, T2DM, KELSEY, and CVA.      He initially presented to the ED on 10/19/2024 with complaints of chest pain. His workup at that time was unremarkable and he was to be discharged home however be began to act erratically, family confirmed he has a history of Bipolar disorder for which he has not been taking his medications. He was evaluated by BSMART and ultimately a TDO was obtained. While awaiting Psyhiatric admission, the patient's labs demonstrated acute renal insufficiency. Hospitalist was consulted for workup of this.      At bedside this morning the patient is somnolent and only minimally responsive to painful stimuli. RN reports the patient has just received a dose of IV ativan. Due to this history is primarily obtained via chart review.     CVA   R sided weakness - improved  CT unremarkable  CTA no stenosis noted  MRI with right and left frontal lobe and left occipital lobe stroke  Possible cardioembolic source  Continuing aspirin and

## 2024-10-31 NOTE — CARE COORDINATION
JIM:     Discharge today to [facility]    Room # 2116    Report [phone]    15:00 H2H transport, ambulatory patient; priv pay by jeffy Castellanos, $98.88    D/c plan reviewed with Pt and 2nd [facility] letter signed/copy given.     [facility] liaison Deisy Shafferson [phone]     PT's jeffy Groves: [phone]     --  Pt lives with his wife Aurora Groves who is at [facility]. Stepdaughter Jasmin is his primary contact.     Transition of Care Plan:    RUR: 14%  Prior Level of Functioning: home indep  Disposition:[facility]  SANDHYA: 10/29  If SNF or IPR: Date FOC offered: 10/25 with jeffy  Date FOC received: 10/25 with jeffy  Accepting facility:   Date authorization started with reference number:   Date authorization received and expires:   Follow up appointments:   DME needed:   Transportation at discharge: w/c van  IM/IMM Medicare/ letter given: y  Is patient a  and connected with [facility]?    If yes, was [place] transfer form completed and [facility] notified?   Caregiver Contact: jeffy Groves [phone]   Discharge Caregiver contacted prior to discharge?   Care Conference needed?   Barriers to discharge:  none

## 2024-11-01 LAB
F5 P.R506Q BLD/T QL: NORMAL
IMP & REVIEW OF LAB RESULTS: NORMAL

## 2024-11-01 NOTE — TELEPHONE ENCOUNTER
Tried to get in contact with patient again today, no answer.  Only working phone ending with [phone] has a full mailbox. Unable to get in contact with patient.

## 2024-11-04 LAB
F2 GENE MUT ANL BLD/T: NORMAL
IMP & REVIEW OF LAB RESULTS: NORMAL

## 2024-11-20 ENCOUNTER — APPOINTMENT (OUTPATIENT)
Facility: HOSPITAL | Age: 74
End: 2024-11-20
Payer: MEDICARE

## 2024-11-20 ENCOUNTER — HOSPITAL ENCOUNTER (EMERGENCY)
Facility: HOSPITAL | Age: 74
Discharge: HOME | End: 2024-11-21
Attending: EMERGENCY MEDICINE
Payer: MEDICARE

## 2024-11-20 DIAGNOSIS — K44.9 HIATAL HERNIA: ICD-10-CM

## 2024-11-20 DIAGNOSIS — R10.13 ABDOMINAL PAIN, EPIGASTRIC: Primary | ICD-10-CM

## 2024-11-20 DIAGNOSIS — R07.9 ACUTE CHEST PAIN: ICD-10-CM

## 2024-11-20 DIAGNOSIS — K76.0 HEPATIC STEATOSIS: ICD-10-CM

## 2024-11-20 LAB
ALBUMIN SERPL-MCNC: 3 G/DL (ref 3.5–5)
ALBUMIN/GLOB SERPL: 0.8 (ref 1.1–2.2)
ALP SERPL-CCNC: 69 U/L (ref 45–117)
ALT SERPL-CCNC: 20 U/L (ref 12–78)
ANION GAP SERPL CALC-SCNC: 5 MMOL/L (ref 2–12)
AST SERPL-CCNC: 21 U/L (ref 15–37)
BASOPHILS # BLD: 0 K/UL (ref 0–0.1)
BASOPHILS NFR BLD: 1 % (ref 0–1)
BILIRUB SERPL-MCNC: 0.3 MG/DL (ref 0.2–1)
BUN SERPL-MCNC: 14 MG/DL (ref 6–20)
BUN/CREAT SERPL: 13 (ref 12–20)
CALCIUM SERPL-MCNC: 8.7 MG/DL (ref 8.5–10.1)
CHLORIDE SERPL-SCNC: 107 MMOL/L (ref 97–108)
CO2 SERPL-SCNC: 26 MMOL/L (ref 21–32)
CREAT SERPL-MCNC: 1.1 MG/DL (ref 0.7–1.3)
DIFFERENTIAL METHOD BLD: ABNORMAL
EOSINOPHIL # BLD: 0.2 K/UL (ref 0–0.4)
EOSINOPHIL NFR BLD: 3 % (ref 0–7)
ERYTHROCYTE [DISTWIDTH] IN BLOOD BY AUTOMATED COUNT: 13.8 % (ref 11.5–14.5)
GLOBULIN SER CALC-MCNC: 3.6 G/DL (ref 2–4)
GLUCOSE SERPL-MCNC: 79 MG/DL (ref 65–100)
HCT VFR BLD AUTO: 34.3 % (ref 36.6–50.3)
HGB BLD-MCNC: 11 G/DL (ref 12.1–17)
IMM GRANULOCYTES # BLD AUTO: 0 K/UL (ref 0–0.04)
IMM GRANULOCYTES NFR BLD AUTO: 1 % (ref 0–0.5)
LIPASE SERPL-CCNC: 26 U/L (ref 13–75)
LYMPHOCYTES # BLD: 2.1 K/UL (ref 0.8–3.5)
LYMPHOCYTES NFR BLD: 33 % (ref 12–49)
MCH RBC QN AUTO: 29.7 PG (ref 26–34)
MCHC RBC AUTO-ENTMCNC: 32.1 G/DL (ref 30–36.5)
MCV RBC AUTO: 92.7 FL (ref 80–99)
MONOCYTES # BLD: 0.7 K/UL (ref 0–1)
MONOCYTES NFR BLD: 11 % (ref 5–13)
NEUTS SEG # BLD: 3.4 K/UL (ref 1.8–8)
NEUTS SEG NFR BLD: 51 % (ref 32–75)
NRBC # BLD: 0 K/UL (ref 0–0.01)
NRBC BLD-RTO: 0 PER 100 WBC
NT PRO BNP: 208 PG/ML
PLATELET # BLD AUTO: 169 K/UL (ref 150–400)
PMV BLD AUTO: 10.5 FL (ref 8.9–12.9)
POTASSIUM SERPL-SCNC: 3.6 MMOL/L (ref 3.5–5.1)
PROT SERPL-MCNC: 6.6 G/DL (ref 6.4–8.2)
RBC # BLD AUTO: 3.7 M/UL (ref 4.1–5.7)
SODIUM SERPL-SCNC: 138 MMOL/L (ref 136–145)
TROPONIN I SERPL HS-MCNC: 22 NG/L (ref 0–76)
WBC # BLD AUTO: 6.5 K/UL (ref 4.1–11.1)

## 2024-11-20 PROCEDURE — 76705 ECHO EXAM OF ABDOMEN: CPT

## 2024-11-20 PROCEDURE — 93005 ELECTROCARDIOGRAM TRACING: CPT

## 2024-11-20 PROCEDURE — 85025 COMPLETE CBC W/AUTO DIFF WBC: CPT

## 2024-11-20 PROCEDURE — 6360000004 HC RX CONTRAST MEDICATION: Performed by: RADIOLOGY

## 2024-11-20 PROCEDURE — 96375 TX/PRO/DX INJ NEW DRUG ADDON: CPT

## 2024-11-20 PROCEDURE — 74177 CT ABD & PELVIS W/CONTRAST: CPT

## 2024-11-20 PROCEDURE — 6360000002 HC RX W HCPCS: Performed by: EMERGENCY MEDICINE

## 2024-11-20 PROCEDURE — 96374 THER/PROPH/DIAG INJ IV PUSH: CPT

## 2024-11-20 PROCEDURE — 36415 COLL VENOUS BLD VENIPUNCTURE: CPT

## 2024-11-20 PROCEDURE — 83880 ASSAY OF NATRIURETIC PEPTIDE: CPT

## 2024-11-20 PROCEDURE — 71045 X-RAY EXAM CHEST 1 VIEW: CPT

## 2024-11-20 PROCEDURE — 84484 ASSAY OF TROPONIN QUANT: CPT

## 2024-11-20 PROCEDURE — 99285 EMERGENCY DEPT VISIT HI MDM: CPT

## 2024-11-20 PROCEDURE — 80053 COMPREHEN METABOLIC PANEL: CPT

## 2024-11-20 PROCEDURE — 83690 ASSAY OF LIPASE: CPT

## 2024-11-20 RX ORDER — MORPHINE SULFATE 4 MG/ML
4 INJECTION, SOLUTION INTRAMUSCULAR; INTRAVENOUS
Status: COMPLETED | OUTPATIENT
Start: 2024-11-20 | End: 2024-11-20

## 2024-11-20 RX ORDER — ONDANSETRON 2 MG/ML
4 INJECTION INTRAMUSCULAR; INTRAVENOUS ONCE
Status: COMPLETED | OUTPATIENT
Start: 2024-11-20 | End: 2024-11-20

## 2024-11-20 RX ORDER — ONDANSETRON 4 MG/1
4 TABLET, FILM COATED ORAL EVERY 8 HOURS PRN
Qty: 15 TABLET | Refills: 0 | Status: SHIPPED | OUTPATIENT
Start: 2024-11-20

## 2024-11-20 RX ORDER — FAMOTIDINE 20 MG/1
20 TABLET, FILM COATED ORAL 2 TIMES DAILY
Qty: 60 TABLET | Refills: 0 | Status: SHIPPED | OUTPATIENT
Start: 2024-11-20

## 2024-11-20 RX ORDER — IOPAMIDOL 755 MG/ML
100 INJECTION, SOLUTION INTRAVASCULAR
Status: COMPLETED | OUTPATIENT
Start: 2024-11-20 | End: 2024-11-20

## 2024-11-20 RX ORDER — DICYCLOMINE HCL 20 MG
20 TABLET ORAL 4 TIMES DAILY PRN
Qty: 20 TABLET | Refills: 0 | Status: SHIPPED | OUTPATIENT
Start: 2024-11-20

## 2024-11-20 RX ADMIN — ONDANSETRON 4 MG: 2 INJECTION, SOLUTION INTRAMUSCULAR; INTRAVENOUS at 23:13

## 2024-11-20 RX ADMIN — IOPAMIDOL 100 ML: 755 INJECTION, SOLUTION INTRAVENOUS at 23:04

## 2024-11-20 RX ADMIN — MORPHINE SULFATE 4 MG: 4 INJECTION, SOLUTION INTRAMUSCULAR; INTRAVENOUS at 23:15

## 2024-11-20 ASSESSMENT — LIFESTYLE VARIABLES
HOW MANY STANDARD DRINKS CONTAINING ALCOHOL DO YOU HAVE ON A TYPICAL DAY: 1 OR 2
HOW OFTEN DO YOU HAVE A DRINK CONTAINING ALCOHOL: 2-3 TIMES A WEEK

## 2024-11-20 ASSESSMENT — HEART SCORE: ECG: NORMAL

## 2024-11-20 ASSESSMENT — PAIN SCALES - GENERAL: PAINLEVEL_OUTOF10: 7

## 2024-11-21 VITALS
HEART RATE: 76 BPM | TEMPERATURE: 98.5 F | SYSTOLIC BLOOD PRESSURE: 121 MMHG | OXYGEN SATURATION: 98 % | DIASTOLIC BLOOD PRESSURE: 79 MMHG | BODY MASS INDEX: 41.96 KG/M2 | RESPIRATION RATE: 27 BRPM | WEIGHT: 315 LBS

## 2024-11-21 LAB
EKG ATRIAL RATE: 78 BPM
EKG DIAGNOSIS: NORMAL
EKG P AXIS: 47 DEGREES
EKG P-R INTERVAL: 222 MS
EKG Q-T INTERVAL: 398 MS
EKG QRS DURATION: 86 MS
EKG QTC CALCULATION (BAZETT): 453 MS
EKG R AXIS: -25 DEGREES
EKG T AXIS: 5 DEGREES
EKG VENTRICULAR RATE: 78 BPM

## 2024-11-21 ASSESSMENT — PAIN SCALES - GENERAL: PAINLEVEL_OUTOF10: 3

## 2024-11-21 NOTE — ED PROVIDER NOTES
History:  Past Surgical History:   Procedure Laterality Date    COLONOSCOPY,REMV LESN,SNARE  4/9/2015         EXTRAC ERUPTED TOOTH/EXPOSED ROOT      TRABECULECTOMY Right     UPPER GI ENDOSCOPY,BIOPSY  4/9/2015            Family History:  Family History   Problem Relation Age of Onset    Hypertension Father        Social History:  Social History     Tobacco Use    Smoking status: Former     Current packs/day: 0.50     Types: Cigarettes    Smokeless tobacco: Never   Substance Use Topics    Alcohol use: No    Drug use: No       Allergies:  No Known Allergies    CURRENT MEDICATIONS      Previous Medications    ACETAMINOPHEN (TYLENOL) 325 MG TABLET    Take by mouth every 4 hours as needed    APIXABAN (ELIQUIS) 5 MG TABS TABLET    Take 2 tablets by mouth 2 times daily for 4 days, THEN 1 tablet 2 times daily.    ASPIRIN 81 MG EC TABLET    Take by mouth every 8 hours    ATORVASTATIN (LIPITOR) 40 MG TABLET    Take 1 tablet by mouth every evening    BUSPIRONE (BUSPAR) 5 MG TABLET    Take 1 tablet by mouth 2 times daily    DILTIAZEM HCL  MG TB24    Take 1 tablet by mouth at bedtime    FUROSEMIDE (LASIX) 20 MG TABLET    Take 1 tablet by mouth daily as needed (swelling)    GLIPIZIDE (GLUCOTROL) 5 MG TABLET    Take 1 tablet by mouth daily    HYDROCHLOROTHIAZIDE 12.5 MG TABLET    Take 1 tablet by mouth daily    LATANOPROST (XALATAN) 0.005 % OPHTHALMIC SOLUTION    Place 1 drop into both eyes nightly    LISINOPRIL (PRINIVIL;ZESTRIL) 40 MG TABLET    Take 1 tablet by mouth daily    METFORMIN (GLUCOPHAGE) 500 MG TABLET    Take by mouth 2 times daily (with meals)    RISPERIDONE (RISPERDAL) 1 MG TABLET    Take 1 tablet by mouth nightly    TIMOLOL (BETIMOL) 0.5 % OPHTHALMIC SOLUTION    Place 1 drop into both eyes 2 times daily    TRAZODONE (DESYREL) 50 MG TABLET    Take 1 tablet by mouth 4 times daily as needed (please use this first line mild aggitation over ativan if possible)       SCREENINGS               No data recorded

## 2025-01-27 ENCOUNTER — HOSPITAL ENCOUNTER (EMERGENCY)
Facility: HOSPITAL | Age: 75
Discharge: HOME | End: 2025-01-27
Attending: STUDENT IN AN ORGANIZED HEALTH CARE EDUCATION/TRAINING PROGRAM
Payer: MEDICARE

## 2025-01-27 VITALS
RESPIRATION RATE: 16 BRPM | TEMPERATURE: 98 F | SYSTOLIC BLOOD PRESSURE: 156 MMHG | DIASTOLIC BLOOD PRESSURE: 94 MMHG | OXYGEN SATURATION: 99 % | HEART RATE: 68 BPM

## 2025-01-27 DIAGNOSIS — R45.4 IRRITABLE BEHAVIOR: ICD-10-CM

## 2025-01-27 DIAGNOSIS — G44.209 TENSION HEADACHE: Primary | ICD-10-CM

## 2025-01-27 PROCEDURE — 6370000000 HC RX 637 (ALT 250 FOR IP): Performed by: STUDENT IN AN ORGANIZED HEALTH CARE EDUCATION/TRAINING PROGRAM

## 2025-01-27 PROCEDURE — 99283 EMERGENCY DEPT VISIT LOW MDM: CPT

## 2025-01-27 RX ORDER — ACETAMINOPHEN 500 MG
1000 TABLET ORAL
Status: COMPLETED | OUTPATIENT
Start: 2025-01-27 | End: 2025-01-27

## 2025-01-27 RX ORDER — RISPERIDONE 1 MG/1
1 TABLET ORAL ONCE
Status: COMPLETED | OUTPATIENT
Start: 2025-01-27 | End: 2025-01-27

## 2025-01-27 RX ADMIN — RISPERIDONE 1 MG: 1 TABLET, FILM COATED ORAL at 01:48

## 2025-01-27 RX ADMIN — ACETAMINOPHEN 1000 MG: 500 TABLET, FILM COATED ORAL at 01:37

## 2025-01-27 ASSESSMENT — LIFESTYLE VARIABLES
HOW OFTEN DO YOU HAVE A DRINK CONTAINING ALCOHOL: 2-4 TIMES A MONTH
HOW MANY STANDARD DRINKS CONTAINING ALCOHOL DO YOU HAVE ON A TYPICAL DAY: 3 OR 4

## 2025-01-27 ASSESSMENT — PAIN SCALES - GENERAL: PAINLEVEL_OUTOF10: 0

## 2025-01-27 NOTE — ED NOTES
Pt is expressing that he does not want to leave after being given discharge papers. HCSO at bedside explaining to pt that he is discharged and has to go home. HCSO is concerned for impairment and decided to breathalyze pt at bedside. Pt agrees to leave and is escorted outside by HCSO.

## 2025-01-27 NOTE — ED PROVIDER NOTES
[facility] EMERGENCY DEPARTMENT  EMERGENCY DEPARTMENT ENCOUNTER       Pt Name: Mani Estrella  MRN: [id number]  Birthdate 1950  Date of evaluation: 1/27/2025  Provider: Finesse Calhoun MD   PCP: Dionne Mcrae APRN - NP  Note Started: 1:30 AM EST 1/27/25     CHIEF COMPLAINT       Chief Complaint   Patient presents with    Dizziness     Patient arrives ambulatory to triage with complaint of dizziness. Patient reports that he became dizzy while driving home.         HISTORY OF PRESENT ILLNESS: 1 or more elements      History From: Patient  HPI Limitations: Mental Health Disorder     Mani Estrella is a 74 y.o. male who presents for vague complaints of blurry vision and an episode of dizziness.  He states right now he does not feel dizzy.  States his vision is also come back to normal.  He states that today he was with friends watching the football game.  He states he did consume alcohol as well as marijuana.  Denies other substance use today.  He states he has not taken any of his medications today.  He states he was driving home.  He states that the police pulled him over because he was driving slowly down the road.  He states that he then took a nap in his car.  He states after waking up he then came to the hospital to be checked out.  He does report a dull headache.  He states he would like a dose of his Risperdal, he states that this normally helps with his symptoms.  He is taken no other medications prior to arrival for symptoms.  Denies numbness or weakness in his hands or feet.  Denies slurred speech, dizziness, lightheadedness, chest pain, shortness of breath, nausea, vomiting.    The patient is a poor historian     Nursing Notes were all reviewed and agreed with or any disagreements were addressed in the HPI.     REVIEW OF SYSTEMS      Review of Systems     Positives and Pertinent negatives as per HPI.    PAST HISTORY     Past Medical History:  Past Medical History:   Diagnosis Date

## 2025-01-28 ENCOUNTER — HOSPITAL ENCOUNTER (EMERGENCY)
Facility: HOSPITAL | Age: 75
Discharge: HOME | End: 2025-01-28
Payer: MEDICARE

## 2025-01-28 VITALS
TEMPERATURE: 98.1 F | OXYGEN SATURATION: 98 % | HEIGHT: 73 IN | BODY MASS INDEX: 38.28 KG/M2 | SYSTOLIC BLOOD PRESSURE: 156 MMHG | WEIGHT: 288.8 LBS | RESPIRATION RATE: 18 BRPM | HEART RATE: 94 BPM | DIASTOLIC BLOOD PRESSURE: 90 MMHG

## 2025-01-28 DIAGNOSIS — S61.212A: Primary | ICD-10-CM

## 2025-01-28 PROCEDURE — 99282 EMERGENCY DEPT VISIT SF MDM: CPT

## 2025-01-28 ASSESSMENT — PAIN SCALES - GENERAL: PAINLEVEL_OUTOF10: 0

## 2025-01-29 ENCOUNTER — HOSPITAL ENCOUNTER (EMERGENCY)
Facility: HOSPITAL | Age: 75
Discharge: HOME | End: 2025-01-29
Attending: EMERGENCY MEDICINE
Payer: MEDICARE

## 2025-01-29 VITALS
TEMPERATURE: 97.9 F | HEART RATE: 94 BPM | HEIGHT: 73 IN | RESPIRATION RATE: 22 BRPM | SYSTOLIC BLOOD PRESSURE: 180 MMHG | OXYGEN SATURATION: 100 % | DIASTOLIC BLOOD PRESSURE: 87 MMHG | BODY MASS INDEX: 41.75 KG/M2 | WEIGHT: 315 LBS

## 2025-01-29 DIAGNOSIS — Z92.89: ICD-10-CM

## 2025-01-29 DIAGNOSIS — S61.212A LACERATION OF RIGHT MIDDLE FINGER WITHOUT FOREIGN BODY WITHOUT DAMAGE TO NAIL, INITIAL ENCOUNTER: Primary | ICD-10-CM

## 2025-01-29 LAB
ALBUMIN SERPL-MCNC: 3.5 G/DL (ref 3.5–5)
ALBUMIN/GLOB SERPL: 0.9 (ref 1.1–2.2)
ALP SERPL-CCNC: 75 U/L (ref 45–117)
ALT SERPL-CCNC: 25 U/L (ref 12–78)
ANION GAP SERPL CALC-SCNC: 3 MMOL/L (ref 2–12)
AST SERPL-CCNC: 26 U/L (ref 15–37)
BASOPHILS # BLD: 0.04 K/UL (ref 0–0.1)
BASOPHILS NFR BLD: 0.7 % (ref 0–1)
BILIRUB SERPL-MCNC: 0.6 MG/DL (ref 0.2–1)
BUN SERPL-MCNC: 10 MG/DL (ref 6–20)
BUN/CREAT SERPL: 9 (ref 12–20)
CALCIUM SERPL-MCNC: 9.1 MG/DL (ref 8.5–10.1)
CHLORIDE SERPL-SCNC: 106 MMOL/L (ref 97–108)
CO2 SERPL-SCNC: 27 MMOL/L (ref 21–32)
CREAT SERPL-MCNC: 1.16 MG/DL (ref 0.7–1.3)
DIFFERENTIAL METHOD BLD: ABNORMAL
EOSINOPHIL # BLD: 0.14 K/UL (ref 0–0.4)
EOSINOPHIL NFR BLD: 2.6 % (ref 0–7)
ERYTHROCYTE [DISTWIDTH] IN BLOOD BY AUTOMATED COUNT: 14.4 % (ref 11.5–14.5)
ETHANOL SERPL-MCNC: <10 MG/DL (ref 0–0.08)
GLOBULIN SER CALC-MCNC: 3.8 G/DL (ref 2–4)
GLUCOSE SERPL-MCNC: 130 MG/DL (ref 65–100)
HCT VFR BLD AUTO: 37.8 % (ref 36.6–50.3)
HGB BLD-MCNC: 12.3 G/DL (ref 12.1–17)
IMM GRANULOCYTES # BLD AUTO: 0.02 K/UL (ref 0–0.04)
IMM GRANULOCYTES NFR BLD AUTO: 0.4 % (ref 0–0.5)
LYMPHOCYTES # BLD: 1.74 K/UL (ref 0.8–3.5)
LYMPHOCYTES NFR BLD: 31.8 % (ref 12–49)
MCH RBC QN AUTO: 31.7 PG (ref 26–34)
MCHC RBC AUTO-ENTMCNC: 32.5 G/DL (ref 30–36.5)
MCV RBC AUTO: 97.4 FL (ref 80–99)
MONOCYTES # BLD: 0.56 K/UL (ref 0–1)
MONOCYTES NFR BLD: 10.2 % (ref 5–13)
NEUTS SEG # BLD: 2.98 K/UL (ref 1.8–8)
NEUTS SEG NFR BLD: 54.3 % (ref 32–75)
NRBC # BLD: 0 K/UL (ref 0–0.01)
NRBC BLD-RTO: 0 PER 100 WBC
PLATELET # BLD AUTO: 202 K/UL (ref 150–400)
PMV BLD AUTO: 10.5 FL (ref 8.9–12.9)
POTASSIUM SERPL-SCNC: 3.4 MMOL/L (ref 3.5–5.1)
PROT SERPL-MCNC: 7.3 G/DL (ref 6.4–8.2)
RBC # BLD AUTO: 3.88 M/UL (ref 4.1–5.7)
SODIUM SERPL-SCNC: 136 MMOL/L (ref 136–145)
TROPONIN I SERPL HS-MCNC: 33 NG/L (ref 0–76)
WBC # BLD AUTO: 5.5 K/UL (ref 4.1–11.1)

## 2025-01-29 PROCEDURE — 80053 COMPREHEN METABOLIC PANEL: CPT

## 2025-01-29 PROCEDURE — 82077 ASSAY SPEC XCP UR&BREATH IA: CPT

## 2025-01-29 PROCEDURE — 84484 ASSAY OF TROPONIN QUANT: CPT

## 2025-01-29 PROCEDURE — 85025 COMPLETE CBC W/AUTO DIFF WBC: CPT

## 2025-01-29 PROCEDURE — 6370000000 HC RX 637 (ALT 250 FOR IP): Performed by: EMERGENCY MEDICINE

## 2025-01-29 PROCEDURE — 99284 EMERGENCY DEPT VISIT MOD MDM: CPT

## 2025-01-29 PROCEDURE — 36415 COLL VENOUS BLD VENIPUNCTURE: CPT

## 2025-01-29 PROCEDURE — 93005 ELECTROCARDIOGRAM TRACING: CPT | Performed by: EMERGENCY MEDICINE

## 2025-01-29 RX ORDER — BACITRACIN ZINC AND POLYMYXIN B SULFATE 500; 1000 [USP'U]/G; [USP'U]/G
OINTMENT TOPICAL
Qty: 15 G | Refills: 0 | Status: SHIPPED | OUTPATIENT
Start: 2025-01-29 | End: 2025-02-05

## 2025-01-29 RX ORDER — GINSENG 100 MG
CAPSULE ORAL
Status: COMPLETED | OUTPATIENT
Start: 2025-01-29 | End: 2025-01-29

## 2025-01-29 RX ADMIN — BACITRACIN 1 EACH: 500 OINTMENT TOPICAL at 14:37

## 2025-01-29 ASSESSMENT — PAIN DESCRIPTION - DESCRIPTORS: DESCRIPTORS: ACHING

## 2025-01-29 ASSESSMENT — PAIN DESCRIPTION - ORIENTATION: ORIENTATION: LEFT;RIGHT

## 2025-01-29 ASSESSMENT — PAIN DESCRIPTION - LOCATION: LOCATION: LEG

## 2025-01-29 ASSESSMENT — PAIN SCALES - GENERAL: PAINLEVEL_OUTOF10: 5

## 2025-01-29 NOTE — ED NOTES
Patient ambulatory to triage from home with laceration that occurred PTA. Patient states he was seen yesterday and was told it could not be stitched. Harry DOBBINS-NP to triage to assess patient's laceration. Laceration noted to anterior upper area of third finger of R hand; patient states it opened today and requests three stitches to area. No bleeding is observed, area appears clean but dry. Pain on palpation reported. Harry NP explained criteria for stitches and options for treatment, patient verbalizes understanding. Wound cleaned and dressed by Harry NP, sterile petroleum gauze applied beneath dressing and secured with bandage.    Patient requested NP to assess toe on R foot. Pt inquired about shaving down area of first toe, Harry NP expressed that the ED did not perform those procedures and did not have the appropriate tools required for that procedure. Patient verbalized understanding and confirmed that he has a podiatrist.

## 2025-01-29 NOTE — ED NOTES
This RN discussed with charge RN the patient heading up stairs to his wife.       After discussion, I attempted to call unit and charge RN of floor that his wife is on and notify them.

## 2025-01-29 NOTE — ED PROVIDER NOTES
(obstructive sleep apnea)     Polysubstance abuse (HCC)     Pulmonary embolism (HCC) 10/24/2024    unknown cause    Stroke (HCC)     april 2015       Past Surgical History:  Past Surgical History:   Procedure Laterality Date    COLONOSCOPY,REMV LESN,SNARE  4/9/2015         EXTRAC ERUPTED TOOTH/EXPOSED ROOT      TRABECULECTOMY Right     UPPER GI ENDOSCOPY,BIOPSY  4/9/2015            Family History:  Family History   Problem Relation Age of Onset    Hypertension Father        Social History:  Social History     Tobacco Use    Smoking status: Former     Current packs/day: 0.50     Types: Cigarettes    Smokeless tobacco: Never   Substance Use Topics    Alcohol use: No    Drug use: No       Allergies:  No Known Allergies    CURRENT MEDICATIONS      Previous Medications    ACETAMINOPHEN (TYLENOL) 325 MG TABLET    Take by mouth every 4 hours as needed    APIXABAN (ELIQUIS) 5 MG TABS TABLET    Take 2 tablets by mouth 2 times daily for 4 days, THEN 1 tablet 2 times daily.    ASPIRIN 81 MG EC TABLET    Take by mouth every 8 hours    ATORVASTATIN (LIPITOR) 40 MG TABLET    Take 1 tablet by mouth every evening    BUSPIRONE (BUSPAR) 5 MG TABLET    Take 1 tablet by mouth 2 times daily    DICYCLOMINE (BENTYL) 20 MG TABLET    Take 1 tablet by mouth 4 times daily as needed (Abdominal cramping)    DILTIAZEM HCL  MG TB24    Take 1 tablet by mouth at bedtime    FAMOTIDINE (PEPCID) 20 MG TABLET    Take 1 tablet by mouth 2 times daily    FUROSEMIDE (LASIX) 20 MG TABLET    Take 1 tablet by mouth daily as needed (swelling)    GLIPIZIDE (GLUCOTROL) 5 MG TABLET    Take 1 tablet by mouth daily    HYDROCHLOROTHIAZIDE 12.5 MG TABLET    Take 1 tablet by mouth daily    LATANOPROST (XALATAN) 0.005 % OPHTHALMIC SOLUTION    Place 1 drop into both eyes nightly    LISINOPRIL (PRINIVIL;ZESTRIL) 40 MG TABLET    Take 1 tablet by mouth daily    METFORMIN (GLUCOPHAGE) 500 MG TABLET    Take by mouth 2 times daily (with meals)    ONDANSETRON (ZOFRAN) 4

## 2025-01-29 NOTE — DISCHARGE INSTRUCTIONS
You were seen in the ER for your symptoms. Please follow-up with your primary care doctor and mental health resources. Return for new or worsening symptoms at any time.    [facility] Outpatient Mental Health Providers    Accepts Insured Patients Only:  [facility]  [address]       [phone]   Near the [facility] and [place] in the [place] of [facility].  Accepts most insurance including Medicaid/Medicare.  No psychiatry.  On the [facility] bus line.    [facility]  [address] ([place])     [phone] [facility] [address] [facility]     [phone]  [address] ([place])     [phone]   Accepts most major insurances.  Psychiatry available.  Some DBT groups.    [facility])    [phone]   Mixture of psychologists and psychiatrists.  They do not accept Medicaid or Medicare.    [facility]  [address]       [phone]   Mixture of clinical social workers and psychologists.      Sliding Scale/Financial Aid/Differing Payment Options  [facility]  [address] ([place])      [phone]   Our own Jean Gould and Patti Dunham.  Variety of treatment options, including DBT.    [facility]  [address]       [phone]   Provides a variety of group and individual counseling options.  Insurance, Medicaid, Medicare and sliding scale      Medicaid/Medicare providers in the [address] [place] [address]  [facility]  [address]       [phone]    [facility]         [address]       [phone]    [address] [place] [address]    [phone]      [facility]  [facility]     [phone]    formerly [facility]      [phone]    [facility]       [phone]    [facility]      [phone]      Services for patients without Medicaid, Medicare or

## 2025-01-29 NOTE — ED PROVIDER NOTES
[facility] EMERGENCY DEPARTMENT  EMERGENCY DEPARTMENT ENCOUNTER       Pt Name: Mani Estrella  MRN: [id number]  Birthdate 1950  Date of evaluation: 1/28/2025  Provider: MU Gonzalez NP   PCP: Dionne Mcrae APRN - NP  Note Started: 12:55 AM 1/28/25     CHIEF COMPLAINT       Chief Complaint   Patient presents with    Laceration     Seen here last night for lac on right middle finger and sts was d/c w/o sutures. Sts wound opened up today and is painful and requests sutures. Reports pain denies fevers, nvd         HISTORY OF PRESENT ILLNESS: 1 or more elements      History From: Patient  HPI Limitations: Mental Health Disorder     Mani Estrella is a 74 y.o. male with PMHx of bipolar, low back pain, glaucoma, HTN, KELSEY, polysubstance use, PE, and stroke  who presents to the ED today for concerns regarding request for sutures of R middle finger. Patient states he cut his finger yesterday and wants stitches today. Patient states his wife is also admitted somewhere in the hospital receiving dialysis and patient wants to visit her. Patient also states he missed his podiatry appointment and asking if I can \"grind or shave\" off the large callous on his R foot near his big toe.  Denies fevers, chills, falls, chest pain, SOB, N/V/D.     See MDM Documentation for further HPI and PMHx      Nursing Notes were all reviewed and agreed with or any disagreements were addressed in the HPI.     REVIEW OF SYSTEMS      Review of Systems     Positives and Pertinent negatives as per HPI.    PAST HISTORY     Past Medical History:  Past Medical History:   Diagnosis Date    Hypertension     Ill-defined condition     chronic lower back pain    Stroke (HCC)     april 2015       Past Surgical History:  Past Surgical History:   Procedure Laterality Date    COLONOSCOPY,REMV LESN,SNARE  4/9/2015         EXTRAC ERUPTED TOOTH/EXPOSED ROOT      TRABECULECTOMY Right     UPPER GI ENDOSCOPY,BIOPSY  4/9/2015

## 2025-01-29 NOTE — BSMART NOTE
BSMART assessment completed, and suicide risk level noted to be no risk. Primary Nurse Marley and Charge Nurse and Physician Dr. Olivia notified. Concerns observed by pt tried to smoke in the room, notified ML Whitfield LCSW      
impairment.      Section V - Substance Abuse  The patient is not using substances.     Section VI - Living Arrangements  The patient .  The patient lives with a spouse. The patient has  children .  The patient does plan to return home upon discharge.  The patient does not have legal issues pending. The patient's source of income comes from not assessed.  Faith and cultural practices have been noted and include: Sikhism.    The patient's greatest support comes from no one identified and this person will not be involved with the treatment.    The patient has not been in an event described as horrible or outside the realm of ordinary life experience either currently or in the past.  The patient has not been a victim of sexual/physical abuse.    Section VII - Other Areas of Clinical Concern  The highest grade achieved is not assessed with the overall quality of school experience being described as n/a.   The patient is currently other and speaks English as a primary language.  The patient has no communication impairments affecting communication. The patient's preference for learning can be described as: can read and write adequately.  The patient's hearing is normal.  The patient's vision is normal.      SALVADOR WEISS LCSW

## 2025-01-31 LAB
EKG ATRIAL RATE: 92 BPM
EKG DIAGNOSIS: NORMAL
EKG P AXIS: 70 DEGREES
EKG P-R INTERVAL: 218 MS
EKG Q-T INTERVAL: 394 MS
EKG QRS DURATION: 86 MS
EKG QTC CALCULATION (BAZETT): 487 MS
EKG R AXIS: -29 DEGREES
EKG T AXIS: 35 DEGREES
EKG VENTRICULAR RATE: 92 BPM

## 2025-02-19 ENCOUNTER — HOSPITAL ENCOUNTER (EMERGENCY)
Facility: HOSPITAL | Age: 75
Discharge: HOME | End: 2025-02-19
Attending: STUDENT IN AN ORGANIZED HEALTH CARE EDUCATION/TRAINING PROGRAM
Payer: MEDICARE

## 2025-02-19 VITALS
HEART RATE: 100 BPM | WEIGHT: 283 LBS | OXYGEN SATURATION: 98 % | TEMPERATURE: 97.8 F | RESPIRATION RATE: 18 BRPM | SYSTOLIC BLOOD PRESSURE: 108 MMHG | BODY MASS INDEX: 37.34 KG/M2 | DIASTOLIC BLOOD PRESSURE: 72 MMHG

## 2025-02-19 DIAGNOSIS — F10.920 ACUTE ALCOHOLIC INTOXICATION WITHOUT COMPLICATION: Primary | ICD-10-CM

## 2025-02-19 PROCEDURE — 6370000000 HC RX 637 (ALT 250 FOR IP): Performed by: STUDENT IN AN ORGANIZED HEALTH CARE EDUCATION/TRAINING PROGRAM

## 2025-02-19 PROCEDURE — 99283 EMERGENCY DEPT VISIT LOW MDM: CPT

## 2025-02-19 RX ORDER — HYDROXYZINE HYDROCHLORIDE 25 MG/1
TABLET, FILM COATED ORAL
COMMUNITY

## 2025-02-19 RX ORDER — IBUPROFEN 400 MG/1
400 TABLET, FILM COATED ORAL
Status: COMPLETED | OUTPATIENT
Start: 2025-02-19 | End: 2025-02-19

## 2025-02-19 RX ADMIN — IBUPROFEN 400 MG: 400 TABLET, FILM COATED ORAL at 18:11

## 2025-02-19 ASSESSMENT — PAIN DESCRIPTION - LOCATION: LOCATION: HEAD

## 2025-02-19 ASSESSMENT — PAIN - FUNCTIONAL ASSESSMENT: PAIN_FUNCTIONAL_ASSESSMENT: NONE - DENIES PAIN

## 2025-02-19 NOTE — ED NOTES
Pt presents to ED via EMS ambulatory complaining of mental health evaluation and alcohol detoxification. Per EMS,  Pt had a stroke x 2 months ago and has been drinking alcohol more frequently. Pt's family called PD d/t concern for mental health. Pt states coming in voluntary d/t not wanting to be handcuffed. Pt denies SI/HI/AH/VH to this nurse. Pt states he is looking for resources to help detox from alcohol. Pt states he drank about a pint of Brown Whiskey and also consumed marijuana. Pt has hx of Bipolar. Pt also states he wants to be seen at a facility in [place], unable top obtain name of facility d/t pt off-topic conversation.     EMS provided wife's number Aleisha [phone]. Wife has dementia. Unable to obtain any other family member's number at this time.   Pt is alert and oriented x 4, RR even and unlabored, skin is warm and dry. Assessment completed and pt updated on plan of care.  Call bell in reach.        Emergency Department Nursing Plan of Care       The Nursing Plan of Care is developed from the Nursing assessment and Emergency Department Attending provider initial evaluation.  The plan of care may be reviewed in the “ED Provider note”.    The Plan of Care was developed with the following considerations:   Patient / Family readiness to learn indicated by:verbalized understanding  Persons(s) to be included in education: patient  Barriers to Learning/Limitations:None    Signed

## 2025-02-19 NOTE — BSMART NOTE
Comprehensive Assessment Form Part 1      Section I - Disposition  Bipolar Disorder    Past Medical History:   Diagnosis Date    Bipolar affective disorder (HCC)     Chronic low back pain     Glaucoma     acute angle    Hypertension     KELSEY (obstructive sleep apnea)     Polysubstance abuse (HCC)     Pulmonary embolism (HCC) 10/24/2024    unknown cause    Stroke (HCC)     april 2015      The Medical Doctor to Psychiatrist conference was notcompleted.  The Medical Doctor is in agreement with Psychiatrist disposition because of (reason) n/a.  The plan is discharge home and follow up with [facility]. Crisis number provided. Substance use resources provided. Referral to partial hospitalization program completed via email.  The on-call Psychiatrist consulted was Dr. Mondragon.  The admitting Psychiatrist will be Dr. JUÁREZ/brice.  The admitting Diagnosis is n/a.  The Payor source is [employer]ICARE     This writer reviewed the [place] Suicide Severity Rating Scale in nursing flowsheet and the risk level assigned is no risk.  Based on this assessment, the risk of suicide is no risk and the plan is to discharge home and follow up with [facility]. Crisis number provided. Substance use resources provided. Referral to partial hospitalization program completed via email.    Section II - Integrated Summary  Summary:      Patient is a 74 year old male that was seen in the emergency department at [facility]. This writer met with patient face to face. Introduced self and role. Pt was alert, oriented and cooperative. When this writer asked pt what brought him to the ED, he stated, \"I know I need to stop drinking alcohol.\" Pt reported he drinks brown whiskey a couple times a week, about a pint and a half. Pt stated he has drank \"all my God damn life.\" Pt shared he also feels his nervous system has shattered. When this writer asked pt if he felt depressed, he denied and stated, \"I feel good.\" Pt asked for a shot of Gin and coke and

## 2025-02-19 NOTE — ED NOTES
Discharge instructions were given to the patient by Tracy RIOS. The patient left the Emergency Department ambulatory, alert and oriented and in no acute distress with 1 prescriptions. The patient was encouraged to call or return to the ED for worsening issues or problems and was encouraged to schedule a follow up appointment for continuing care. The patient verbalized understanding of discharge instructions and prescriptions, all questions were answered. The patient has no further concerns at this time.

## 2025-02-19 NOTE — BSMART NOTE
BSMART assessment completed, and suicide risk level noted to be no risk. Primary Nurse BLANCA Molina and Charge Nurse BLANCA Wu and Physician Dr. Calhoun notified. Concerns not observed.     Precious Whitfield LCSW

## 2025-02-19 NOTE — ED PROVIDER NOTES
[facility] EMERGENCY DEPARTMENT  EMERGENCY DEPARTMENT ENCOUNTER       Pt Name: Mani Estrella  MRN: [id number]  Birthdate 1950  Date of evaluation: 2/19/2025  Provider: Finesse Calhoun MD   PCP: Jake Alba MD  Note Started: 6:33 PM EST 2/19/25     CHIEF COMPLAINT       Chief Complaint   Patient presents with    Mental Health Problem        HISTORY OF PRESENT ILLNESS: 1 or more elements      History From: Patient  HPI Limitations: Intoxication     Mani Estrella is a 74 y.o. male who presents from home via EMS for complaints of alcohol use.  He states he needs help with his alcohol use and would like some resources.  He states he last drink today.  He reports he drinks daily.  He states he is trying inpatient and outpatient rehab.  He lives at home with his wife.  Denies any pain or discomfort.  Denies SI or HI.  Denies auditory visual hallucinations.  He has no physical complaints or any other concerns.  He is requesting him to eat and having to drink.     Nursing Notes were all reviewed and agreed with or any disagreements were addressed in the HPI.     REVIEW OF SYSTEMS      Review of Systems     Positives and Pertinent negatives as per HPI.    PAST HISTORY     Past Medical History:  Past Medical History:   Diagnosis Date    Bipolar affective disorder (HCC)     Chronic low back pain     Glaucoma     acute angle    Hypertension     KELSEY (obstructive sleep apnea)     Polysubstance abuse (HCC)     Pulmonary embolism (HCC) 10/24/2024    unknown cause    Stroke (HCC)     april 2015         Past Surgical History:  Past Surgical History:   Procedure Laterality Date    COLONOSCOPY,REMV LESN,SNARE  4/9/2015         EXTRAC ERUPTED TOOTH/EXPOSED ROOT      TRABECULECTOMY Right     UPPER GI ENDOSCOPY,BIOPSY  4/9/2015            Family History:  Family History   Problem Relation Age of Onset    Hypertension Father        Social History:  Social History     Tobacco Use    Smoking status: Former     Current

## 2025-03-08 ENCOUNTER — HOSPITAL ENCOUNTER (EMERGENCY)
Facility: HOSPITAL | Age: 75
Discharge: HOME | End: 2025-03-08
Attending: STUDENT IN AN ORGANIZED HEALTH CARE EDUCATION/TRAINING PROGRAM
Payer: MEDICARE

## 2025-03-08 VITALS
RESPIRATION RATE: 17 BRPM | WEIGHT: 296 LBS | OXYGEN SATURATION: 99 % | HEIGHT: 70 IN | DIASTOLIC BLOOD PRESSURE: 74 MMHG | SYSTOLIC BLOOD PRESSURE: 134 MMHG | HEART RATE: 100 BPM | TEMPERATURE: 98.2 F | BODY MASS INDEX: 42.37 KG/M2

## 2025-03-08 DIAGNOSIS — M79.89 LEG SWELLING: Primary | ICD-10-CM

## 2025-03-08 LAB
ALBUMIN SERPL-MCNC: 3.3 G/DL (ref 3.5–5)
ALBUMIN/GLOB SERPL: 0.8 (ref 1.1–2.2)
ALP SERPL-CCNC: 95 U/L (ref 45–117)
ALT SERPL-CCNC: 27 U/L (ref 12–78)
ANION GAP SERPL CALC-SCNC: 11 MMOL/L (ref 2–12)
AST SERPL-CCNC: 26 U/L (ref 15–37)
BASOPHILS # BLD: 0.04 K/UL (ref 0–0.1)
BASOPHILS NFR BLD: 0.6 % (ref 0–1)
BILIRUB SERPL-MCNC: 0.3 MG/DL (ref 0.2–1)
BUN SERPL-MCNC: 12 MG/DL (ref 6–20)
BUN/CREAT SERPL: 11 (ref 12–20)
CALCIUM SERPL-MCNC: 8.5 MG/DL (ref 8.5–10.1)
CHLORIDE SERPL-SCNC: 103 MMOL/L (ref 97–108)
CO2 SERPL-SCNC: 26 MMOL/L (ref 21–32)
CREAT SERPL-MCNC: 1.05 MG/DL (ref 0.7–1.3)
DIFFERENTIAL METHOD BLD: ABNORMAL
EOSINOPHIL # BLD: 0.1 K/UL (ref 0–0.4)
EOSINOPHIL NFR BLD: 1.6 % (ref 0–7)
ERYTHROCYTE [DISTWIDTH] IN BLOOD BY AUTOMATED COUNT: 12.8 % (ref 11.5–14.5)
GLOBULIN SER CALC-MCNC: 3.9 G/DL (ref 2–4)
GLUCOSE SERPL-MCNC: 100 MG/DL (ref 65–100)
HCT VFR BLD AUTO: 35.8 % (ref 36.6–50.3)
HGB BLD-MCNC: 11.9 G/DL (ref 12.1–17)
IMM GRANULOCYTES # BLD AUTO: 0.03 K/UL (ref 0–0.04)
IMM GRANULOCYTES NFR BLD AUTO: 0.5 % (ref 0–0.5)
LYMPHOCYTES # BLD: 1.28 K/UL (ref 0.8–3.5)
LYMPHOCYTES NFR BLD: 20.6 % (ref 12–49)
MCH RBC QN AUTO: 31.4 PG (ref 26–34)
MCHC RBC AUTO-ENTMCNC: 33.2 G/DL (ref 30–36.5)
MCV RBC AUTO: 94.5 FL (ref 80–99)
MONOCYTES # BLD: 0.77 K/UL (ref 0–1)
MONOCYTES NFR BLD: 12.4 % (ref 5–13)
NEUTS SEG # BLD: 4 K/UL (ref 1.8–8)
NEUTS SEG NFR BLD: 64.3 % (ref 32–75)
NRBC # BLD: 0 K/UL (ref 0–0.01)
NRBC BLD-RTO: 0 PER 100 WBC
NT PRO BNP: 449 PG/ML (ref 0–450)
PLATELET # BLD AUTO: 263 K/UL (ref 150–400)
PMV BLD AUTO: 10.4 FL (ref 8.9–12.9)
POTASSIUM SERPL-SCNC: 3.8 MMOL/L (ref 3.5–5.1)
PROT SERPL-MCNC: 7.2 G/DL (ref 6.4–8.2)
RBC # BLD AUTO: 3.79 M/UL (ref 4.1–5.7)
SODIUM SERPL-SCNC: 140 MMOL/L (ref 136–145)
WBC # BLD AUTO: 6.2 K/UL (ref 4.1–11.1)

## 2025-03-08 PROCEDURE — 80053 COMPREHEN METABOLIC PANEL: CPT

## 2025-03-08 PROCEDURE — 85025 COMPLETE CBC W/AUTO DIFF WBC: CPT

## 2025-03-08 PROCEDURE — 83880 ASSAY OF NATRIURETIC PEPTIDE: CPT

## 2025-03-08 PROCEDURE — 36415 COLL VENOUS BLD VENIPUNCTURE: CPT

## 2025-03-08 PROCEDURE — 99283 EMERGENCY DEPT VISIT LOW MDM: CPT

## 2025-03-08 ASSESSMENT — PAIN DESCRIPTION - DESCRIPTORS: DESCRIPTORS: ACHING

## 2025-03-08 ASSESSMENT — PAIN DESCRIPTION - ORIENTATION: ORIENTATION: LEFT

## 2025-03-08 ASSESSMENT — PAIN - FUNCTIONAL ASSESSMENT: PAIN_FUNCTIONAL_ASSESSMENT: 0-10

## 2025-03-08 ASSESSMENT — PAIN SCALES - GENERAL: PAINLEVEL_OUTOF10: 6

## 2025-03-08 NOTE — ED TRIAGE NOTES
Pt was brought in by EMS pt stating \" I don't feel like it, like out of it\".  Pt stated he was not able to sleep last night and reports headache.    Upon assessment at triage pt had bilateral leg swelling.  Pt denies chest pain, shortness of breath or cold symptoms. PT denies HI/SI.    Pt stated he doesn't want to talk to his wife and sister in law. Pt reports he's staying in his car.   Pt is dosing off in triage.

## 2025-03-08 NOTE — ED PROVIDER NOTES
EMERGENCY DEPARTMENT HISTORY AND PHYSICAL EXAM      Date: 3/8/2025  Patient Name: Mani Estrella    History of Presenting Illness     Chief Complaint   Patient presents with    Leg Swelling    Headache         HPI: History From: patient, History limited by: none  Mani Estrella, 74 y.o. male presents to the ED with cc of leg swelling.  Patient reports swelling in both of his legs for over a month.  He denies acute change.  He has a prescription for Lasix, however states he does not always take it every day.  He otherwise denies acute complaints, and states that he would like something to eat.  He denies chest pain or shortness of breath, no fever, no acute abdominal pain, vomiting or diarrhea.  He has been compliant with his daily Lasix.  He drinks alcohol every day, also reports tobacco and marijuana.  No other drug use.          There are no other complaints, changes, or physical findings at this time.    PCP: Jake Alba MD    No current facility-administered medications on file prior to encounter.     Current Outpatient Medications on File Prior to Encounter   Medication Sig Dispense Refill    hydrOXYzine HCl (ATARAX) 25 MG tablet 1 tab(s) orally once a day at bedtime for 90 days  as needed      ondansetron (ZOFRAN) 4 MG tablet Take 1 tablet by mouth every 8 hours as needed for Nausea or Vomiting (Patient not taking: Reported on 2/19/2025) 15 tablet 0    dicyclomine (BENTYL) 20 MG tablet Take 1 tablet by mouth 4 times daily as needed (Abdominal cramping) 20 tablet 0    famotidine (PEPCID) 20 MG tablet Take 1 tablet by mouth 2 times daily 60 tablet 0    hydroCHLOROthiazide 12.5 MG tablet Take 1 tablet by mouth daily 30 tablet 3    apixaban (ELIQUIS) 5 MG TABS tablet Take 2 tablets by mouth 2 times daily for 4 days, THEN 1 tablet 2 times daily. 160 tablet 0    risperiDONE (RISPERDAL) 1 MG tablet Take 1 tablet by mouth nightly 60 tablet 3    traZODone (DESYREL) 50 MG tablet Take 1 tablet by mouth 4 times daily as  tablet  Commonly known as: BENTYL  Take 1 tablet by mouth 4 times daily as needed (Abdominal cramping)     dilTIAZem HCl  MG Tb24     famotidine 20 MG tablet  Commonly known as: Pepcid  Take 1 tablet by mouth 2 times daily     furosemide 20 MG tablet  Commonly known as: LASIX     glipiZIDE 5 MG tablet  Commonly known as: GLUCOTROL     hydroCHLOROthiazide 12.5 MG tablet  Take 1 tablet by mouth daily     hydrOXYzine HCl 25 MG tablet  Commonly known as: ATARAX     latanoprost 0.005 % ophthalmic solution  Commonly known as: XALATAN     lisinopril 40 MG tablet  Commonly known as: PRINIVIL;ZESTRIL     metFORMIN 500 MG tablet  Commonly known as: GLUCOPHAGE     ondansetron 4 MG tablet  Commonly known as: Zofran  Take 1 tablet by mouth every 8 hours as needed for Nausea or Vomiting     risperiDONE 1 MG tablet  Commonly known as: RISPERDAL  Take 1 tablet by mouth nightly     timolol 0.5 % ophthalmic solution  Commonly known as: BETIMOL     traZODone 50 MG tablet  Commonly known as: DESYREL  Take 1 tablet by mouth 4 times daily as needed (please use this first line mild aggitation over ativan if possible)            2. Jake Alba MD  [address] [place] [id number]  [phone]    Schedule an appointment as soon as possible for a visit in 3 days      [facility]  [address] [facility] [address]  [phone]    As needed, If symptoms worsen    Return to ED if worse     Diagnosis     Clinical Impression: Leg edema               Jodi Naidu MD  03/08/25 [id number]

## 2025-03-08 NOTE — ED NOTES
Pt presents ambulatory to the ED c/o bilateral leg swelling and headaches x 2 weeks that has gotten worse. Pt does take furosemide but has not gotten a refill and does not take as prescribed. Pt also endorses drinking whiskey into this morning. Pt denies SOB.     Emergency Department Nursing Plan of Care       The Nursing Plan of Care is developed from the Nursing assessment and Emergency Department Attending provider initial evaluation.  The plan of care may be reviewed in the “ED Provider note”.      The Plan of Care was developed with the following considerations:  Patient / Family readiness to learn indicated by:verbalized understanding  Persons(s) to be included in education: patient  Barriers to Learning/Limitations:None      Signed     DALLAS RAPP RN    3/8/2025   9:57 AM

## 2025-04-07 ENCOUNTER — HOSPITAL ENCOUNTER (EMERGENCY)
Facility: HOSPITAL | Age: 75
Discharge: HOME | End: 2025-04-07
Payer: MEDICARE

## 2025-04-07 VITALS
BODY MASS INDEX: 38.58 KG/M2 | HEIGHT: 72 IN | WEIGHT: 284.83 LBS | OXYGEN SATURATION: 99 % | SYSTOLIC BLOOD PRESSURE: 142 MMHG | RESPIRATION RATE: 13 BRPM | TEMPERATURE: 97.6 F | HEART RATE: 78 BPM | DIASTOLIC BLOOD PRESSURE: 102 MMHG

## 2025-04-07 DIAGNOSIS — E87.6 HYPOKALEMIA: ICD-10-CM

## 2025-04-07 DIAGNOSIS — R60.0 BILATERAL LOWER EXTREMITY EDEMA: Primary | ICD-10-CM

## 2025-04-07 LAB
ALBUMIN SERPL-MCNC: 3 G/DL (ref 3.5–5)
ALBUMIN/GLOB SERPL: 0.7 (ref 1.1–2.2)
ALP SERPL-CCNC: 86 U/L (ref 45–117)
ALT SERPL-CCNC: 18 U/L (ref 12–78)
ANION GAP SERPL CALC-SCNC: 6 MMOL/L (ref 2–12)
APPEARANCE UR: CLEAR
AST SERPL-CCNC: 24 U/L (ref 15–37)
BACTERIA URNS QL MICRO: NEGATIVE /HPF
BASOPHILS # BLD: 0 K/UL (ref 0–0.1)
BASOPHILS NFR BLD: 0 % (ref 0–1)
BILIRUB SERPL-MCNC: 0.5 MG/DL (ref 0.2–1)
BILIRUB UR QL: NEGATIVE
BUN SERPL-MCNC: 12 MG/DL (ref 6–20)
BUN/CREAT SERPL: 13 (ref 12–20)
CALCIUM SERPL-MCNC: 8.7 MG/DL (ref 8.5–10.1)
CHLORIDE SERPL-SCNC: 104 MMOL/L (ref 97–108)
CO2 SERPL-SCNC: 27 MMOL/L (ref 21–32)
COLOR UR: ABNORMAL
CREAT SERPL-MCNC: 0.91 MG/DL (ref 0.7–1.3)
DIFFERENTIAL METHOD BLD: ABNORMAL
EOSINOPHIL # BLD: 0.1 K/UL (ref 0–0.4)
EOSINOPHIL NFR BLD: 2 % (ref 0–7)
EPITH CASTS URNS QL MICRO: ABNORMAL /LPF
ERYTHROCYTE [DISTWIDTH] IN BLOOD BY AUTOMATED COUNT: 13.1 % (ref 11.5–14.5)
GLOBULIN SER CALC-MCNC: 4.1 G/DL (ref 2–4)
GLUCOSE SERPL-MCNC: 99 MG/DL (ref 65–100)
GLUCOSE UR STRIP.AUTO-MCNC: NEGATIVE MG/DL
HCT VFR BLD AUTO: 38.4 % (ref 36.6–50.3)
HGB BLD-MCNC: 12.5 G/DL (ref 12.1–17)
HGB UR QL STRIP: NEGATIVE
IMM GRANULOCYTES # BLD AUTO: 0 K/UL
IMM GRANULOCYTES NFR BLD AUTO: 0 %
KETONES UR QL STRIP.AUTO: 15 MG/DL
LEUKOCYTE ESTERASE UR QL STRIP.AUTO: NEGATIVE
LIPASE SERPL-CCNC: 28 U/L (ref 13–75)
LYMPHOCYTES # BLD: 0.99 K/UL (ref 0.8–3.5)
LYMPHOCYTES NFR BLD: 19 % (ref 12–49)
MCH RBC QN AUTO: 30.8 PG (ref 26–34)
MCHC RBC AUTO-ENTMCNC: 32.6 G/DL (ref 30–36.5)
MCV RBC AUTO: 94.6 FL (ref 80–99)
MONOCYTES # BLD: 0.68 K/UL (ref 0–1)
MONOCYTES NFR BLD: 13 % (ref 5–13)
NEUTS SEG # BLD: 3.43 K/UL (ref 1.8–8)
NEUTS SEG NFR BLD: 66 % (ref 32–75)
NITRITE UR QL STRIP.AUTO: NEGATIVE
NRBC # BLD: 0 K/UL (ref 0–0.01)
NRBC BLD-RTO: 0 PER 100 WBC
NT PRO BNP: 876 PG/ML (ref 0–450)
PH UR STRIP: 5.5 (ref 5–8)
PLATELET # BLD AUTO: 206 K/UL (ref 150–400)
PMV BLD AUTO: 10.5 FL (ref 8.9–12.9)
POTASSIUM SERPL-SCNC: 3.4 MMOL/L (ref 3.5–5.1)
PROT SERPL-MCNC: 7.1 G/DL (ref 6.4–8.2)
PROT UR STRIP-MCNC: ABNORMAL MG/DL
RBC # BLD AUTO: 4.06 M/UL (ref 4.1–5.7)
RBC #/AREA URNS HPF: ABNORMAL /HPF (ref 0–5)
RBC MORPH BLD: ABNORMAL
SODIUM SERPL-SCNC: 137 MMOL/L (ref 136–145)
SP GR UR REFRACTOMETRY: 1.02
URINE CULTURE IF INDICATED: ABNORMAL
UROBILINOGEN UR QL STRIP.AUTO: 1 EU/DL (ref 0.2–1)
WBC # BLD AUTO: 5.2 K/UL (ref 4.1–11.1)
WBC URNS QL MICRO: ABNORMAL /HPF (ref 0–4)

## 2025-04-07 PROCEDURE — 6360000002 HC RX W HCPCS: Performed by: PHYSICIAN ASSISTANT

## 2025-04-07 PROCEDURE — 85025 COMPLETE CBC W/AUTO DIFF WBC: CPT

## 2025-04-07 PROCEDURE — 80053 COMPREHEN METABOLIC PANEL: CPT

## 2025-04-07 PROCEDURE — 36415 COLL VENOUS BLD VENIPUNCTURE: CPT

## 2025-04-07 PROCEDURE — 6370000000 HC RX 637 (ALT 250 FOR IP): Performed by: PHYSICIAN ASSISTANT

## 2025-04-07 PROCEDURE — 83880 ASSAY OF NATRIURETIC PEPTIDE: CPT

## 2025-04-07 PROCEDURE — 81001 URINALYSIS AUTO W/SCOPE: CPT

## 2025-04-07 PROCEDURE — 99284 EMERGENCY DEPT VISIT MOD MDM: CPT

## 2025-04-07 PROCEDURE — 83690 ASSAY OF LIPASE: CPT

## 2025-04-07 PROCEDURE — 96374 THER/PROPH/DIAG INJ IV PUSH: CPT

## 2025-04-07 RX ORDER — POTASSIUM CHLORIDE 750 MG/1
40 TABLET, EXTENDED RELEASE ORAL ONCE
Status: COMPLETED | OUTPATIENT
Start: 2025-04-07 | End: 2025-04-07

## 2025-04-07 RX ORDER — ACETAMINOPHEN 500 MG
1000 TABLET ORAL
Status: COMPLETED | OUTPATIENT
Start: 2025-04-07 | End: 2025-04-07

## 2025-04-07 RX ORDER — FUROSEMIDE 10 MG/ML
40 INJECTION INTRAMUSCULAR; INTRAVENOUS ONCE
Status: COMPLETED | OUTPATIENT
Start: 2025-04-07 | End: 2025-04-07

## 2025-04-07 RX ADMIN — ACETAMINOPHEN 1000 MG: 500 TABLET ORAL at 13:55

## 2025-04-07 RX ADMIN — POTASSIUM CHLORIDE 40 MEQ: 750 TABLET, FILM COATED, EXTENDED RELEASE ORAL at 15:07

## 2025-04-07 RX ADMIN — FUROSEMIDE 40 MG: 10 INJECTION, SOLUTION INTRAMUSCULAR; INTRAVENOUS at 15:07

## 2025-04-07 ASSESSMENT — PAIN - FUNCTIONAL ASSESSMENT: PAIN_FUNCTIONAL_ASSESSMENT: 0-10

## 2025-04-07 ASSESSMENT — PAIN DESCRIPTION - ORIENTATION: ORIENTATION: RIGHT;LEFT

## 2025-04-07 ASSESSMENT — PAIN DESCRIPTION - DESCRIPTORS: DESCRIPTORS: SHARP

## 2025-04-07 ASSESSMENT — PAIN SCALES - GENERAL: PAINLEVEL_OUTOF10: 8

## 2025-04-07 ASSESSMENT — PAIN DESCRIPTION - LOCATION: LOCATION: LEG

## 2025-04-07 ASSESSMENT — PAIN DESCRIPTION - PAIN TYPE: TYPE: ACUTE PAIN

## 2025-04-07 NOTE — ED NOTES
Patient standing at sink rinsing his lanyard with water. I asked patient why he is doing this, patient told me \"dont worry about it\".  I asked if patient would get back in bed. Patient does not wish to do that att.

## 2025-04-07 NOTE — ED TRIAGE NOTES
Pt arrived to the ED with complaints of bilateral leg swelling for about 2 weeks. Pt states it has gotten worse and now his feet are swollen. Pt states they are very painful.

## 2025-04-07 NOTE — ED PROVIDER NOTES
Comments:   Reason for Stopping:               I have seen and evaluated the patient autonomously. My supervision physician was on site and available for consultation if needed.     I am the Primary Clinician of Record.   Eunice Pena PA-C (electronically signed)    (Please note that parts of this dictation were completed with voice recognition software. Quite often unanticipated grammatical, syntax, homophones, and other interpretive errors are inadvertently transcribed by the computer software. Please disregards these errors. Please excuse any errors that have escaped final proofreading.)     Eunice Pena PA-C  04/07/25 2188

## 2025-04-07 NOTE — ED NOTES
Patient still remains naked in room and refusing to get in wheelchair despite security giving patient a strict time limit.     Police called to assist att

## 2025-04-07 NOTE — DISCHARGE INSTRUCTIONS
It was a pleasure taking care of you at [facility] today.      We know that when you come to [facility], you are entrusting us with your health, comfort, and safety.  Our physicians and nurses honor that trust, and we appreciate the opportunity to care for you and your loved ones.  We also value your feedback, and we would like to hear from you.    When you receive a  >>> survey <<< about your Emergency Department experience today.   Please fill it out and consider giving us all 5's if you had a good experience. We review every single response from our patients. Thank you!

## 2025-04-07 NOTE — ED NOTES
Patient naked standing at sink running the water after being asked to get in wheelchair.   Security asked for assistance in getting patient discharged. Officer gave patient 10 minute warning- will reassess for readiness at that time.   ED  set up ride to cold weather shelter att, patient made aware.

## 2025-04-07 NOTE — ED NOTES
Attempting to discharge patient, however patient states he needs ride home. Patient states that he does not know address of where he lives. Patient states once he uses his urinal, then he will need 5 minutes to \"bring up the address out of my brain\". I advised patient that once he is through urinating, he will need to wait in lobby for ride.

## 2025-05-06 ENCOUNTER — HOSPITAL ENCOUNTER (EMERGENCY)
Facility: HOSPITAL | Age: 75
Discharge: HOME | End: 2025-05-06
Attending: EMERGENCY MEDICINE
Payer: MEDICARE

## 2025-05-06 VITALS
SYSTOLIC BLOOD PRESSURE: 172 MMHG | OXYGEN SATURATION: 100 % | HEIGHT: 73 IN | TEMPERATURE: 98.7 F | RESPIRATION RATE: 15 BRPM | HEART RATE: 94 BPM | BODY MASS INDEX: 37.58 KG/M2 | DIASTOLIC BLOOD PRESSURE: 85 MMHG

## 2025-05-06 DIAGNOSIS — M54.42 ACUTE LEFT-SIDED LOW BACK PAIN WITH LEFT-SIDED SCIATICA: Primary | ICD-10-CM

## 2025-05-06 PROCEDURE — 6360000002 HC RX W HCPCS: Performed by: EMERGENCY MEDICINE

## 2025-05-06 PROCEDURE — 99284 EMERGENCY DEPT VISIT MOD MDM: CPT

## 2025-05-06 PROCEDURE — 96372 THER/PROPH/DIAG INJ SC/IM: CPT

## 2025-05-06 RX ORDER — KETOROLAC TROMETHAMINE 15 MG/ML
15 INJECTION, SOLUTION INTRAMUSCULAR; INTRAVENOUS ONCE
Status: COMPLETED | OUTPATIENT
Start: 2025-05-06 | End: 2025-05-06

## 2025-05-06 RX ORDER — LIDOCAINE 50 MG/G
1 PATCH TOPICAL DAILY
Qty: 10 PATCH | Refills: 0 | Status: SHIPPED | OUTPATIENT
Start: 2025-05-06 | End: 2025-05-16

## 2025-05-06 RX ORDER — GABAPENTIN 300 MG/1
300 CAPSULE ORAL 2 TIMES DAILY PRN
Qty: 60 CAPSULE | Refills: 0 | Status: SHIPPED | OUTPATIENT
Start: 2025-05-06 | End: 2025-06-05

## 2025-05-06 RX ORDER — IBUPROFEN 600 MG/1
600 TABLET, FILM COATED ORAL 4 TIMES DAILY PRN
Qty: 360 TABLET | Refills: 0 | Status: SHIPPED | OUTPATIENT
Start: 2025-05-06

## 2025-05-06 RX ADMIN — KETOROLAC TROMETHAMINE 15 MG: 15 INJECTION, SOLUTION INTRAMUSCULAR; INTRAVENOUS at 04:21

## 2025-05-06 ASSESSMENT — PAIN - FUNCTIONAL ASSESSMENT: PAIN_FUNCTIONAL_ASSESSMENT: 0-10

## 2025-05-06 ASSESSMENT — PAIN SCALES - GENERAL: PAINLEVEL_OUTOF10: 10

## 2025-05-06 NOTE — ED PROVIDER NOTES
EMERGENCY DEPARTMENT HISTORY AND PHYSICAL EXAM      Date: 5/6/2025  Patient Name: Mani Estrella      History of Presenting Illness     Chief Complaint   Patient presents with    Back Pain       Location/Duration/Severity/Modifying factors   Chief Complaint   Patient presents with    Back Pain       HPI:  Mani Estrella is a 74 y.o. male with PMH significant for chronic low back pain, hypertension, KELSEY presents with acute worsening of his low back pain mainly in the left side with radiation down the back of his his left leg down to his foot.  He does occasion pain down the right leg but less so pt  denies recent trauma to the area to explain his symptoms.  Denies weakness of his legs, or spine surgeries.  Treatments attempted at home include none.  Patient is without a home.  Has had chronic issues with holding his bladder sometimes after having a strong urge but has not had incontinence without the urge to urinate.    PCP: Jake Alba MD    No current facility-administered medications for this encounter.     Current Outpatient Medications   Medication Sig Dispense Refill    ibuprofen (ADVIL;MOTRIN) 600 MG tablet Take 1 tablet by mouth 4 times daily as needed for Pain 360 tablet 0    gabapentin (NEURONTIN) 300 MG capsule Take 1 capsule by mouth 2 times daily as needed (low back pain rating down your left leg for nerve related pain) for up to 30 days. Max Daily Amount: 600 mg 60 capsule 0    lidocaine (LIDODERM) 5 % Place 1 patch onto the skin daily for 10 days 12 hours on, 12 hours off. 10 patch 0    hydrOXYzine HCl (ATARAX) 25 MG tablet 1 tab(s) orally once a day at bedtime for 90 days  as needed      dicyclomine (BENTYL) 20 MG tablet Take 1 tablet by mouth 4 times daily as needed (Abdominal cramping) 20 tablet 0    famotidine (PEPCID) 20 MG tablet Take 1 tablet by mouth 2 times daily 60 tablet 0    hydroCHLOROthiazide 12.5 MG tablet Take 1 tablet by mouth daily 30 tablet 3    apixaban (ELIQUIS) 5 MG TABS

## 2025-05-06 NOTE — ED TRIAGE NOTES
Patient in ED via EMS for back pain and bilateral leg pain. Patient states she has been walking a lot. Patient states he has a hx of back pain. Patient states the back pain is radiating into his big toe.

## 2025-05-06 NOTE — DISCHARGE INSTRUCTIONS
Try the oral naproxen or ibuprofen for pain relief.  Can alternate with Tylenol 1 g up to 3 times per day. I also prescribed a numbing patches to the sore areas.  Try stretching exercises of your back muscles, gentle massage, heat application.  I have also prescribed gabapentin for your nerve related pain to your left leg.  This medication may make you feel drowsy.  Consider seeing a physical therapist for evaluation and treatment of your back pain symptoms.

## 2025-05-30 NOTE — ED NOTES
New diagnosis of heart failure.   5/23 Echo with LVEF 30-35%  5/24 Cardiology consulted.   5/28 Losartan started & continue Metoprolol XL. Cardiology sign off.  Cardiology recs outpatient follow up for further ischemia work up in heart failure clinic.  5/30 Lasix 40 mg x 1  5/31 CXR stable.   BNP tomorrow.   Lillian Nelson MD, [facility] (sign off).   Pt refusing to have vital signs taken. Pt given gingerale and popsicle    Attempted to call pt's sister in law Reba twice per patient's request with no response. Pt aware